# Patient Record
Sex: FEMALE | Race: WHITE | NOT HISPANIC OR LATINO | Employment: FULL TIME | URBAN - METROPOLITAN AREA
[De-identification: names, ages, dates, MRNs, and addresses within clinical notes are randomized per-mention and may not be internally consistent; named-entity substitution may affect disease eponyms.]

---

## 2017-01-05 ENCOUNTER — TRANSFERRED RECORDS (OUTPATIENT)
Dept: HEALTH INFORMATION MANAGEMENT | Facility: CLINIC | Age: 57
End: 2017-01-05

## 2017-02-06 DIAGNOSIS — G43.009 MIGRAINE WITHOUT AURA AND WITHOUT STATUS MIGRAINOSUS, NOT INTRACTABLE: Primary | ICD-10-CM

## 2017-02-06 DIAGNOSIS — Z56.6 STRESS AT WORK: ICD-10-CM

## 2017-02-06 RX ORDER — TOPIRAMATE 50 MG/1
50 TABLET, FILM COATED ORAL 2 TIMES DAILY
Qty: 180 TABLET | Refills: 1 | Status: CANCELLED | OUTPATIENT
Start: 2017-02-06

## 2017-02-06 RX ORDER — GLIPIZIDE 5 MG/1
5 TABLET, FILM COATED, EXTENDED RELEASE ORAL
Qty: 180 TABLET | Refills: 0 | Status: CANCELLED | OUTPATIENT
Start: 2017-02-06

## 2017-02-06 RX ORDER — BUPROPION HYDROCHLORIDE 150 MG/1
150 TABLET ORAL EVERY MORNING
Qty: 90 TABLET | Refills: 0 | Status: CANCELLED | OUTPATIENT
Start: 2017-02-06

## 2017-02-06 RX ORDER — LISINOPRIL 10 MG/1
10 TABLET ORAL DAILY
Qty: 90 TABLET | Refills: 1 | Status: CANCELLED | OUTPATIENT
Start: 2017-02-06

## 2017-02-06 SDOH — HEALTH STABILITY - MENTAL HEALTH: OTHER PHYSICAL AND MENTAL STRAIN RELATED TO WORK: Z56.6

## 2017-02-06 NOTE — TELEPHONE ENCOUNTER
topiramate (TOPAMAX) 50 MG tablet       Last Written Prescription Date: 6/17/16  Last Fill Quantity: 180, # refills: 1    [place] [facility] [place] [facility] or  Health prescribing provider:  9/19/2016     Future Office Visit:      CREATININE   Date Value Ref Range Status   09/19/2016 0.78 0.52 - 1.04 mg/dL Final     ________________________________________________________________________________  lisinopril (PRINIVIL,ZESTRIL) 10 MG tablet      Last Written Prescription Date: 6/17/16  Last Fill Quantity: 90, # refills: 1  [place] [facility] [place] [facility] or  Health prescribing provider: 9/19/2016       POTASSIUM   Date Value Ref Range Status   09/19/2016 4.0 3.4 - 5.3 mmol/L Final     CREATININE   Date Value Ref Range Status   09/19/2016 0.78 0.52 - 1.04 mg/dL Final     BP Readings from Last 3 Encounters:   09/19/16 118/88   07/14/16 114/82   06/17/16 94/60     ________________________________________________________________________________  glipiZIDE (GLIPIZIDE XL) 5 MG 24 hr tablet         Last Written Prescription Date: 6/17/16  Last Fill Quantity: 180, # refills: 0  [place] [facility] [place] [facility] or  Health prescribing provider:  9/19/2016        BP Readings from Last 3 Encounters:   09/19/16 118/88   07/14/16 114/82   06/17/16 94/60     MICROL        9   9/19/2016  No results found for this basename: microalbumin  CREATININE   Date Value Ref Range Status   09/19/2016 0.78 0.52 - 1.04 mg/dL Final   ]  GFR ESTIMATE   Date Value Ref Range Status   09/19/2016 77 >60 mL/min/1.7m2 Final     Comment:     Non  GFR Calc   06/17/2016 83 >60 mL/min/1.7m2 Final     Comment:     Non  GFR Calc   12/17/2015 88 >60 mL/min/1.7m2 Final     Comment:     Non  GFR Calc     GFR ESTIMATE IF BLACK   Date Value Ref Range Status   09/19/2016 >90   GFR Calc   >60 mL/min/1.7m2 Final   06/17/2016 >90   GFR Calc   >60 mL/min/1.7m2 Final   12/17/2015 >90    American GFR Calc   >60 mL/min/1.7m2 Final     CHOL      124   6/17/2016  HDL       46   6/17/2016  LDL       51   6/17/2016  LDL       54   6/18/2015  TRIG      137   6/17/2016  CHOLHDLRATIO      3.1   8/24/2015  AST       16   9/19/2016  ALT       30   9/19/2016  A1C      7.0   9/19/2016  A1C      8.4   6/17/2016  A1C      7.6   12/17/2015  A1C      7.7   8/24/2015  A1C      8.3   6/18/2015  POTASSIUM   Date Value Ref Range Status   09/19/2016 4.0 3.4 - 5.3 mmol/L Final     ________________________________________________________________________________  buPROPion (WELLBUTRIN XL) 150 MG 24 hr tablet       Last Written Prescription Date: 6/17/16  Last Fill Quantity: 90; # refills: 1  Last Office Visit with [facility], [facility] or  Health prescribing provider:  9/19/2016        Last PHQ-9 score on record=   PHQ-9 SCORE 9/19/2016   Total Score -   Total Score 8       AST       16   9/19/2016  ALT       30   9/19/2016        REZA Singer

## 2017-02-06 NOTE — TELEPHONE ENCOUNTER
Routing refill request to provider for review/approval because:  Drug not on the [facility] refill protocol Topamax    Due for follow up exam for DM and mood in March OK to fill all?  Bhargavi Mosqueda RN, BS  Clinical Nurse Triage .

## 2017-02-23 ENCOUNTER — TELEPHONE (OUTPATIENT)
Dept: FAMILY MEDICINE | Facility: CLINIC | Age: 57
End: 2017-02-23

## 2017-02-23 NOTE — TELEPHONE ENCOUNTER
Panel Management Review      Patient has the following on her problem list:     Depression / Dysthymia review  PHQ-9 SCORE 12/17/2015 6/17/2016 9/19/2016   Total Score - - -   Total Score 11 8 8      Patient is due for:  PHQ9    Diabetes    ASA: Passed    Last A1C  Lab Results   Component Value Date    A1C 7.0 09/19/2016    A1C 8.4 06/17/2016    A1C 7.6 12/17/2015    A1C 7.7 08/24/2015    A1C 8.3 06/18/2015     A1C tested: Failed    Last LDL:    Lab Results   Component Value Date    CHOL 124 06/17/2016     Lab Results   Component Value Date    HDL 46 06/17/2016     Lab Results   Component Value Date    LDL 51 06/17/2016    LDL 54 06/18/2015     Lab Results   Component Value Date    TRIG 137 06/17/2016     Lab Results   Component Value Date    CHOLHDLRATIO 3.1 08/24/2015     Lab Results   Component Value Date    NHDL 78 06/17/2016       Is the patient on a Statin? YES             Is the patient on Aspirin? YES    Medications     HMG CoA Reductase Inhibitors    atorvastatin (LIPITOR) 10 MG tablet    Salicylates    aspirin 325 MG EC tablet          Last three blood pressure readings:  BP Readings from Last 3 Encounters:   09/19/16 118/88   07/14/16 114/82   06/17/16 94/60       Date of last diabetes office visit: 09/19/16     Tobacco History:     History   Smoking Status     Former Smoker     Packs/day: 1.00     Years: 5.00     Types: Cigarettes     Quit date: 1/4/1985   Smokeless Tobacco     Never Used     Comment: quit in 1985 smoked one pack a day         Hypertension   Last three blood pressure readings:  BP Readings from Last 3 Encounters:   09/19/16 118/88   07/14/16 114/82   06/17/16 94/60     Blood pressure: Passed    HTN Guidelines:  Age 18-59 BP range:  Less than 140/90  Age 60-85 with Diabetes:  Less than 140/90  Age 60-85 without Diabetes:  less than 150/90      Composite cancer screening  Chart review shows that this patient is due/due soon for the following Mammogram  Summary:    Patient is due/failing  the following:   ALT, A1C, LDL, MAMMOGRAM and PHQ9    Action needed:   Patient needs office visit for DIABETES MANAGEMENT.    Type of outreach:    None, routed to provider for review. Patient has a upcoming DIABETES MANAGEMENT appointment scheduled on 02/27/16.    Questions for provider review:    None                                                                                                                                    Lisa Magill, CMA       Chart routed to Provider .

## 2017-02-24 NOTE — PROGRESS NOTES
HPI   SUBJECTIVE:                                                    Susan Dietrich is a 56 year old female who presents to clinic today for the following health issues:    Diabetes Follow-up    Patient is checking blood sugars: once daily.  Results are as follows:         am:     Diabetic concerns: None     Symptoms of hypoglycemia (low blood sugar): none     Paresthesias (numbness or burning in feet) or sores: Yes-sometimes      Date of last diabetic eye exam: January 2017.  Lab Results   Component Value Date    A1C 7.0 09/19/2016    A1C 8.4 06/17/2016    A1C 7.6 12/17/2015    A1C 7.7 08/24/2015    A1C 8.3 06/18/2015     Reports that she has a new stressful job. Is working 5 days a week,and reports she needs to go back to 4 days a week.  Is unsure how her numbers are going to be due to ongoing stress with this job. Did have a high BS reading this morning at 135. Likes when numbers are closer to 100.     Hyperlipidemia Follow-Up      Rate your low fat/cholesterol diet?: not monitoring fat    Taking statin?  Yes, no muscle aches from statin    Other lipid medications/supplements?:  none     Hypertension Follow-up      Outpatient blood pressures are not being checked.    Low Salt Diet: no added salt  BP Readings from Last 6 Encounters:   02/27/17 116/80   09/19/16 118/88   07/14/16 114/82   06/17/16 94/60   12/17/15 120/80   08/24/15 110/80          Depression Followup    Status since last visit: Stable     See PHQ-9 for current symptoms.  Other associated symptoms: NONE     Complicating factors:   Significant life event:  Yes-  [place]    Current substance abuse:  None  Anxiety or Panic symptoms:  No    PHQ-9 SCORE 12/17/2015 6/17/2016 9/19/2016   Total Score - - -   Total Score 11 8 8     VINITA-7 SCORE 12/17/2015 6/17/2016 9/19/2016   Total Score - - -   Total Score 2 5 10     PHQ-9:  VINITA-7:7  PHQ-9  English PHQ-9   Any Language            Amount of exercise or physical activity: 2-3  days/week for an average of 15-30 minutes    Problems taking medications regularly: No    Medication side effects: none  Diet: regular (no restrictions)    PROBLEMS TO ADD ON...  Weight:   Wt Readings from Last 4 Encounters:   02/27/17 90.5 kg (199 lb 8 oz)   09/19/16 92.3 kg (203 lb 8 oz)   06/17/16 93.4 kg (206 lb)   12/17/15 94.8 kg (209 lb)   Is on Topamax 50 mg BID. Reports that she does not want to increase dose. Wants to slowly lose weight. Also does not want to be placed on a restrictive diet. Does note she is a picky eater. Will snack on nuts. Cooks with coconut, grape seed oil, or olive oil.     Problem list and histories reviewed & adjusted, as indicated.  Additional history: as documented    Recent Labs   Lab Test  02/27/17   0844  09/19/16   0910  06/17/16   0918  12/17/15   1140  08/24/15   0902  06/18/15   1635   11/21/14   0808   A1C  7.4*  7.0*  8.4*  7.6*  7.7*  8.3*   < >  7.7*   LDL   --    --   51   --   69  54   --   50   HDL   --    --   46*   --   44*   --    --   54   TRIG   --    --   137   --   123   --    --   111   ALT   --   30  54*  52*  46  78*   --   47   CR   --   0.78  0.73  0.70  0.68  0.62   --   0.56   GFRESTIMATED   --   77  83  88  90  >90  Non  GFR Calc     --   >90  Non  GFR Calc     GFRESTBLACK   --   >90   GFR Calc    >90   GFR Calc    >90   GFR Calc    >90   GFR Calc    >90   GFR Calc     --   >90   GFR Calc     POTASSIUM   --   4.0  4.2  3.9  4.1  4.3   --   4.3   TSH   --    --   1.19   --   1.93   --    --    --     < > = values in this interval not displayed.      BP Readings from Last 3 Encounters:   02/27/17 116/80   09/19/16 118/88   07/14/16 114/82    Wt Readings from Last 3 Encounters:   02/27/17 90.5 kg (199 lb 8 oz)   09/19/16 92.3 kg (203 lb 8 oz)   06/17/16 93.4 kg (206 lb)                  Labs reviewed in EPIC  Problem list,  "Medication list, Allergies, and Medical/Social/Surgical histories reviewed in [facility] and updated as appropriate.    ROS:  CONSTITUTIONAL:NEGATIVE for fever, chills, change in weight  INTEGUMENTARY/SKIN: NEGATIVE for worrisome rashes, moles or lesions  ENT/MOUTH: NEGATIVE for ear, mouth and throat problems  RESP:NEGATIVE for significant cough or SOB  MUSCULOSKELETAL: NEGATIVE for significant arthralgias or myalgia  PSYCHIATRIC: NEGATIVE for changes in mood or affect    This document serves as a record of the services and decisions personally performed and made by Concha Lucas MD. It was created on her behalf by Kathie Lee, a trained medical scribe. The creation of this document is based the provider's statements to the medical scribe.  Kathie Lee February 27, 2017 8:18 AM    OBJECTIVE:                                                    /80 (BP Location: Right arm, Patient Position: Chair, Cuff Size: Adult Large)  Pulse 86  Temp 98  F (36.7  C) (Oral)  Resp 20  Ht 1.651 m (5' 5\")  Wt 90.5 kg (199 lb 8 oz)  LMP 10/03/2010  SpO2 97%  BMI 33.2 kg/m2  Body mass index is 33.2 kg/(m^2).  GENERAL: healthy, alert and no distress  EYES: Eyes grossly normal to inspection, PERRL and conjunctivae and sclerae normal  HENT: ear canals and TM's normal, nose and mouth without ulcers or lesions  NECK: no adenopathy, no asymmetry, masses, or scars and thyroid normal to palpation  RESP: lungs clear to auscultation - no rales, rhonchi or wheezes  CV: regular rate and rhythm, normal S1 S2, no S3 or S4, no murmur, click or rub, no peripheral edema and peripheral pulses strong  MS: no gross musculoskeletal defects noted, no edema  SKIN: no suspicious lesions or rashes  NEURO: Normal strength and tone, mentation intact and speech normal  PSYCH: mentation appears normal, affect normal/bright  Diabetic foot exam: normal DP and PT pulses, some trophic changes or ulcerative lesions, normal sensory exam and normal monofilament " exam  Fungal toe nails noted    Diagnostic Test Results:  Results for orders placed or performed in visit on 02/27/17 (from the past 24 hour(s))   Hemoglobin A1c   Result Value Ref Range    Hemoglobin A1C 7.4 (H) 4.3 - 6.0 %        ASSESSMENT/PLAN:                                                    (E11.40,  E11.65) Type 2 diabetes, uncontrolled, with neuropathy  (primary encounter diagnosis)  Comment: Stable. Advised continued exercise and a healthy diet. Discussed Mediterranean diet. High in healthy fats-olive oil, nuts, and lower in carbs. Switch from canola to olive oil. Continue with medications.   Plan: lisinopril (PRINIVIL/ZESTRIL) 10 MG tablet,         glipiZIDE (GLIPIZIDE XL) 5 MG 24 hr tablet,         Lipid panel reflex to direct LDL, Hemoglobin         A1c, TSH with free T4 reflex, Comprehensive         metabolic panel        Follow up in six months.     (I73.9) PVD (peripheral vascular disease) (H)  Comment: Stable. Keep exercise  Plan: Follow up in six months.     (E78.5) Hyperlipidemia LDL goal <100  Comment: Fasting for labs. Will check lipids. Continue with Lipitor and Prevalite.   Plan: atorvastatin (LIPITOR) 10 MG tablet,         cholestyramine light (PREVALITE) 4 GM powder,         Lipid panel reflex to direct LDL        Follow up in six months.     (Z56.6) Stress at work  Comment: Still present. Continue with wellbutrin. Work letter completed to limit to four days a week.   Plan: Follow up as needed.    (E66.01) obesity due to excess calories (H)  Comment: Stable weight loss. Continue with topamax. Discussed we could increase to 100 mg, patient would like to stay at 50 mg for now. Continue with exercise. Discussed Mediterranean diet-olive oil, nuts, healthy fats. Olive oil instead of canola oil.   Plan: topiramate (TOPAMAX) 50 MG tablet        Follow up in six months.     (Z12.31) Visit for screening mammogram  Comment: Scheduled  Plan: MA Screening Digital Bilateral        Follow up as  needed    (F33.1) Major depressive disorder, recurrent episode, moderate (H)  Comment: Doing well on wellbutrin. Stable.   Plan: buPROPion (WELLBUTRIN XL) 150 MG 24 hr tablet        Follow up in six months.     (B35.1) Onychomycosis  Comment: Have tried lamisil in the past. Rash in third month. Will try topical treatment.   Plan: ciclopirox 8 % SOLN        Follow up as needed.     (Z23) Need for tetanus booster  Comment: TDAP booster needed. Patient agreed.   Plan: TDAP (ADACEL AGES 11-64)        Follow up as needed.     (Z23) Need for shingles vaccine  Comment: Shingles vaccine sent to  pharmacy to have completed.   Plan: zoster vaccine live, PF, (ZOSTAVAX) injection        Follow up as needed.         The information in this document, created by the medical scribe for me, accurately reflects the services I personally performed and the decisions made by me. I have reviewed and approved this document for accuracy prior to leaving the patient care area.  Concha Lucas MD 8:18 AM 2/27/2017          Concha Lucas MD  [facility]      Physical Exam

## 2017-02-27 ENCOUNTER — OFFICE VISIT (OUTPATIENT)
Dept: FAMILY MEDICINE | Facility: CLINIC | Age: 57
End: 2017-02-27
Payer: COMMERCIAL

## 2017-02-27 VITALS
WEIGHT: 199.5 LBS | DIASTOLIC BLOOD PRESSURE: 80 MMHG | RESPIRATION RATE: 20 BRPM | HEART RATE: 86 BPM | SYSTOLIC BLOOD PRESSURE: 116 MMHG | BODY MASS INDEX: 33.24 KG/M2 | OXYGEN SATURATION: 97 % | HEIGHT: 65 IN | TEMPERATURE: 98 F

## 2017-02-27 DIAGNOSIS — Z56.6 STRESS AT WORK: ICD-10-CM

## 2017-02-27 DIAGNOSIS — K58.9 IRRITABLE BOWEL SYNDROME WITHOUT DIARRHEA: ICD-10-CM

## 2017-02-27 DIAGNOSIS — F33.1 MAJOR DEPRESSIVE DISORDER, RECURRENT EPISODE, MODERATE (H): ICD-10-CM

## 2017-02-27 DIAGNOSIS — E78.5 HYPERLIPIDEMIA LDL GOAL <100: ICD-10-CM

## 2017-02-27 DIAGNOSIS — B35.1 ONYCHOMYCOSIS: ICD-10-CM

## 2017-02-27 DIAGNOSIS — E66.01 MORBID OBESITY DUE TO EXCESS CALORIES (H): Chronic | ICD-10-CM

## 2017-02-27 DIAGNOSIS — J32.0 CHRONIC MAXILLARY SINUSITIS: ICD-10-CM

## 2017-02-27 DIAGNOSIS — I73.9 PVD (PERIPHERAL VASCULAR DISEASE) (H): ICD-10-CM

## 2017-02-27 DIAGNOSIS — Z12.31 VISIT FOR SCREENING MAMMOGRAM: ICD-10-CM

## 2017-02-27 DIAGNOSIS — Z23 NEED FOR TETANUS BOOSTER: ICD-10-CM

## 2017-02-27 DIAGNOSIS — G43.009 MIGRAINE WITHOUT AURA AND WITHOUT STATUS MIGRAINOSUS, NOT INTRACTABLE: ICD-10-CM

## 2017-02-27 DIAGNOSIS — Z23 NEED FOR SHINGLES VACCINE: ICD-10-CM

## 2017-02-27 LAB
ALBUMIN SERPL-MCNC: 4 G/DL (ref 3.4–5)
ALP SERPL-CCNC: 69 U/L (ref 40–150)
ALT SERPL W P-5'-P-CCNC: 30 U/L (ref 0–50)
ANION GAP SERPL CALCULATED.3IONS-SCNC: 12 MMOL/L (ref 3–14)
AST SERPL W P-5'-P-CCNC: 21 U/L (ref 0–45)
BILIRUB SERPL-MCNC: 0.6 MG/DL (ref 0.2–1.3)
BUN SERPL-MCNC: 19 MG/DL (ref 7–30)
CALCIUM SERPL-MCNC: 9.6 MG/DL (ref 8.5–10.1)
CHLORIDE SERPL-SCNC: 109 MMOL/L (ref 94–109)
CHOLEST SERPL-MCNC: 121 MG/DL
CO2 SERPL-SCNC: 19 MMOL/L (ref 20–32)
CREAT SERPL-MCNC: 0.76 MG/DL (ref 0.52–1.04)
GFR SERPL CREATININE-BSD FRML MDRD: 79 ML/MIN/1.7M2
GLUCOSE SERPL-MCNC: 171 MG/DL (ref 70–99)
HBA1C MFR BLD: 7.4 % (ref 4.3–6)
HDLC SERPL-MCNC: 49 MG/DL
LDLC SERPL CALC-MCNC: 49 MG/DL
NONHDLC SERPL-MCNC: 72 MG/DL
POTASSIUM SERPL-SCNC: 4.2 MMOL/L (ref 3.4–5.3)
PROT SERPL-MCNC: 7.6 G/DL (ref 6.8–8.8)
SODIUM SERPL-SCNC: 140 MMOL/L (ref 133–144)
TRIGL SERPL-MCNC: 117 MG/DL
TSH SERPL DL<=0.005 MIU/L-ACNC: 1.36 MU/L (ref 0.4–4)

## 2017-02-27 PROCEDURE — 83036 HEMOGLOBIN GLYCOSYLATED A1C: CPT | Performed by: FAMILY MEDICINE

## 2017-02-27 PROCEDURE — 99214 OFFICE O/P EST MOD 30 MIN: CPT | Mod: 25 | Performed by: FAMILY MEDICINE

## 2017-02-27 PROCEDURE — 90471 IMMUNIZATION ADMIN: CPT | Performed by: FAMILY MEDICINE

## 2017-02-27 PROCEDURE — 90715 TDAP VACCINE 7 YRS/> IM: CPT | Performed by: FAMILY MEDICINE

## 2017-02-27 PROCEDURE — 36415 COLL VENOUS BLD VENIPUNCTURE: CPT | Performed by: FAMILY MEDICINE

## 2017-02-27 PROCEDURE — 84443 ASSAY THYROID STIM HORMONE: CPT | Performed by: FAMILY MEDICINE

## 2017-02-27 PROCEDURE — 80061 LIPID PANEL: CPT | Performed by: FAMILY MEDICINE

## 2017-02-27 PROCEDURE — 80053 COMPREHEN METABOLIC PANEL: CPT | Performed by: FAMILY MEDICINE

## 2017-02-27 RX ORDER — TOPIRAMATE 50 MG/1
50 TABLET, FILM COATED ORAL 2 TIMES DAILY
Qty: 180 TABLET | Refills: 1 | Status: SHIPPED | OUTPATIENT
Start: 2017-02-27 | End: 2017-03-15

## 2017-02-27 RX ORDER — ATORVASTATIN CALCIUM 10 MG/1
10 TABLET, FILM COATED ORAL DAILY
Qty: 90 TABLET | Refills: 3 | Status: SHIPPED | OUTPATIENT
Start: 2017-02-27 | End: 2017-03-15

## 2017-02-27 RX ORDER — CICLOPIROX 80 MG/ML
SOLUTION TOPICAL DAILY
Qty: 3 BOTTLE | Refills: 1 | Status: SHIPPED | OUTPATIENT
Start: 2017-02-27 | End: 2017-03-15

## 2017-02-27 RX ORDER — GLIPIZIDE 5 MG/1
5 TABLET, FILM COATED, EXTENDED RELEASE ORAL DAILY
Qty: 90 TABLET | Refills: 1 | Status: SHIPPED | OUTPATIENT
Start: 2017-02-27 | End: 2017-03-15

## 2017-02-27 RX ORDER — LISINOPRIL 10 MG/1
10 TABLET ORAL DAILY
Qty: 90 TABLET | Refills: 1 | Status: SHIPPED | OUTPATIENT
Start: 2017-02-27 | End: 2017-03-15

## 2017-02-27 RX ORDER — BUPROPION HYDROCHLORIDE 150 MG/1
150 TABLET ORAL EVERY MORNING
Qty: 90 TABLET | Refills: 1 | Status: SHIPPED | OUTPATIENT
Start: 2017-02-27 | End: 2017-03-15

## 2017-02-27 RX ORDER — CHOLESTYRAMINE LIGHT 4 G/5.7G
POWDER, FOR SUSPENSION ORAL
Qty: 231 G | Refills: 4 | Status: SHIPPED | OUTPATIENT
Start: 2017-02-27 | End: 2017-03-15

## 2017-02-27 SDOH — HEALTH STABILITY - MENTAL HEALTH: OTHER PHYSICAL AND MENTAL STRAIN RELATED TO WORK: Z56.6

## 2017-02-27 ASSESSMENT — ANXIETY QUESTIONNAIRES
3. WORRYING TOO MUCH ABOUT DIFFERENT THINGS: SEVERAL DAYS
GAD7 TOTAL SCORE: 7
1. FEELING NERVOUS, ANXIOUS, OR ON EDGE: MORE THAN HALF THE DAYS
6. BECOMING EASILY ANNOYED OR IRRITABLE: SEVERAL DAYS
7. FEELING AFRAID AS IF SOMETHING AWFUL MIGHT HAPPEN: NOT AT ALL
5. BEING SO RESTLESS THAT IT IS HARD TO SIT STILL: SEVERAL DAYS
2. NOT BEING ABLE TO STOP OR CONTROL WORRYING: SEVERAL DAYS

## 2017-02-27 ASSESSMENT — PATIENT HEALTH QUESTIONNAIRE - PHQ9: 5. POOR APPETITE OR OVEREATING: SEVERAL DAYS

## 2017-02-27 NOTE — PROGRESS NOTES
Screening Questionnaire for Adult Immunization    Are you sick today?   No   Do you have allergies to medications, food, a vaccine component or latex?   No   Have you ever had a serious reaction after receiving a vaccination?   No   Do you have a long-term health problem with heart disease, lung disease, asthma, kidney disease, metabolic disease (e.g. diabetes), anemia, or other blood disorder?   Yes   Do you have cancer, leukemia, HIV/AIDS, or any other immune system problem?   No   In the past 3 months, have you taken medications that affect  your immune system, such as prednisone, other steroids, or anticancer drugs; drugs for the treatment of rheumatoid arthritis, Crohn s disease, or psoriasis; or have you had radiation treatments?   No   Have you had a seizure, or a brain or other nervous system problem?   No   During the past year, have you received a transfusion of blood or blood     products, or been given immune (gamma) globulin or antiviral drug?   No   For women: Are you pregnant or is there a chance you could become        pregnant during the next month?   No   Have you received any vaccinations in the past 4 weeks?   No     Immunization questionnaire was positive for at least one answer.  Notified Dr. Lucas.      MNVFC doesn't apply on this patient    Per orders of Dr. Lucas, injection of Tdap given by Lisa A. Magill. Patient instructed to remain in clinic for 20 minutes afterwards, and to report any adverse reaction to me immediately.       Screening performed by Lisa A. Magill on 2/27/2017 at 11:30 AM.

## 2017-02-27 NOTE — MR AVS SNAPSHOT
After Visit Summary   2/27/2017    Susan Dietrich    MRN: [id number]           Patient Information     Date Of Birth          1960        Visit Information        Provider Department      2/27/2017 8:00 AM Concha Lucas MD [facility]        Today's Diagnoses     Type 2 diabetes, uncontrolled, with neuropathy    -  1    PVD (peripheral vascular disease) (H)        Hyperlipidemia LDL goal <100        Stress at work        Migraine without aura and without status migrainosus, not intractable        Chronic maxillary sinusitis        Irritable bowel syndrome without diarrhea        obesity due to excess calories (H)        Visit for screening mammogram        Major depressive disorder, recurrent episode, moderate (H)        Onychomycosis        Need for tetanus booster        Need for shingles vaccine           Follow-ups after your visit        Follow-up notes from your care team     Return in about 6 months (around 8/27/2017).      Future tests that were ordered for you today     Open Future Orders        Priority Expected Expires Ordered    MA Screening Digital Bilateral Routine  2/27/2018 2/27/2017            Who to contact     If you have questions or need follow up information about today's clinic visit or your schedule please contact [facility] directly at [phone].  Normal or non-critical lab and imaging results will be communicated to you by MyChart, letter or phone within 4 business days after the clinic has received the results. If you do not hear from us within 7 days, please contact the clinic through MyChart or phone. If you have a critical or abnormal lab result, we will notify you by phone as soon as possible.  Submit refill requests through [employer] or call your pharmacy and they will forward the refill request to us. Please allow 3 business days for your refill to be completed.          Additional Information About Your Visit       "  [employer]Ut Information     [employer] gives you secure access to your electronic health record. If you see a primary care provider, you can also send messages to your care team and make appointments. If you have questions, please call your primary care clinic.  If you do not have a primary care provider, please call [phone] and they will assist you.        Care EveryWhere ID     This is your Care EveryWhere ID. This could be used by other organizations to access your [facility] medical records  [id number]        Your Vitals Were     Pulse Temperature Respirations Height Last Period Pulse Oximetry    86 98  F (36.7  C) (Oral) 20 5' 5\" (1.651 m) 10/03/2010 97%    BMI (Body Mass Index)                   33.2 kg/m2            Blood Pressure from Last 3 Encounters:   02/27/17 116/80   09/19/16 118/88   07/14/16 114/82    Weight from Last 3 Encounters:   02/27/17 199 lb 8 oz (90.5 kg)   09/19/16 203 lb 8 oz (92.3 kg)   06/17/16 206 lb (93.4 kg)              We Performed the Following     Comprehensive metabolic panel     Hemoglobin A1c     Lipid panel reflex to direct LDL     TDAP (ADACEL AGES 11-64)     TSH with free T4 reflex          Today's Medication Changes          These changes are accurate as of: 2/27/17  8:39 AM.  If you have any questions, ask your nurse or doctor.               Start taking these medicines.        Dose/Directions    ciclopirox 8 % Soln   Used for:  Onychomycosis   Started by:  Concha Lucas MD        Externally apply topically daily   Quantity:  3 Bottle   Refills:  1       zoster vaccine live (PF) injection   Commonly known as:  ZOSTAVAX   Used for:  Need for shingles vaccine   Started by:  Concha Lucas MD        Dose:  1 each   Inject 0.65 mLs Subcutaneous once for 1 dose   Quantity:  0.65 mL   Refills:  0            Where to get your medicines      These medications were sent to [facility] - [facility], [place] - [address] [place] [address] " [facility] [address]     Phone:  [phone]     atorvastatin 10 MG tablet    buPROPion 150 MG 24 hr tablet    cholestyramine light 4 GM powder    ciclopirox 8 % Soln    glipiZIDE 5 MG 24 hr tablet    lisinopril 10 MG tablet    topiramate 50 MG tablet         These medications were sent to [facility] [address]  [address] [facility] [address]     Phone:  [phone]     zoster vaccine live (PF) injection                Primary Care Provider Office Phone # Fax #    Concha Kristin Lucas [phone]       [facility] [address]  [address]   [facility] [address]        Thank you!     Thank you for choosing [facility]  for your care. Our goal is always to provide you with excellent care. Hearing back from our patients is one way we can continue to improve our services. Please take a few minutes to complete the written survey that you may receive in the mail after your visit with us. Thank you!             Your Updated Medication List - Protect others around you: Learn how to safely use, store and throw away your medicines at www.disposemymeds.org.          This list is accurate as of: 2/27/17  8:39 AM.  Always use your most recent med list.                   Brand Name Dispense Instructions for use    aspirin 325 MG EC tablet     100 tablet    Take 1 tablet (325 mg) by mouth daily       atorvastatin 10 MG tablet    LIPITOR    90 tablet    Take 1 tablet (10 mg) by mouth daily       buPROPion 150 MG 24 hr tablet    WELLBUTRIN XL    90 tablet    Take 1 tablet (150 mg) by mouth every morning       cholecalciferol 5000 UNITS Caps     90 capsule    Take 1 capsule by mouth daily.       cholestyramine light 4 GM powder    PREVALITE    231 g    1 LEVEL SCOOPFUL DAILY IN LIQUID       ciclopirox 8 % Soln     3 Bottle    Externally apply topically daily       fluticasone 50 MCG/ACT spray    FLONASE    3 g    Spray 1-2 sprays into  both nostrils daily       glipiZIDE 5 MG 24 hr tablet    glipiZIDE XL    90 tablet    Take 1 tablet (5 mg) by mouth daily       lisinopril 10 MG tablet    PRINIVIL/ZESTRIL    90 tablet    Take 1 tablet (10 mg) by mouth daily       metFORMIN 500 MG tablet    GLUCOPHAGE    360 tablet    Take 2 tablets (1,000 mg) by mouth 2 times daily (with meals)       * ONE TOUCH ULTRA test strip   Generic drug:  blood glucose monitoring     300 strip    by In Vitro route 3 times daily. Test as directed 3 times daily       * blood glucose monitoring test strip    TRUETEST    3 Box    by In Vitro route 3 times daily.       order for DME     1 each    Orthotic shoes, pt with diabetes and peripheral neuropathy       topiramate 50 MG tablet    TOPAMAX    180 tablet    Take 1 tablet (50 mg) by mouth 2 times daily       TRUEPLUS LANCETS 33G Misc     3 Month    1 strip 3 times daily TRUETEST LANCETS to be used with the Trutest test strips.       zoster vaccine live (PF) injection    ZOSTAVAX    0.65 mL    Inject 0.65 mLs Subcutaneous once for 1 dose       * Notice:  This list has 2 medication(s) that are the same as other medications prescribed for you. Read the directions carefully, and ask your doctor or other care provider to review them with you.

## 2017-02-27 NOTE — NURSING NOTE
"Chief Complaint   Patient presents with     Diabetes     Health Maintenance     *DUE*     Blood Draw     LABS.  Patient is fasting     Initial /80 (BP Location: Right arm, Patient Position: Chair, Cuff Size: Adult Large)  Pulse 86  Temp 98  F (36.7  C) (Oral)  Resp 20  Ht 5' 5\" (1.651 m)  Wt 199 lb 8 oz (90.5 kg)  LMP 10/03/2010  SpO2 97%  BMI 33.2 kg/m2 Estimated body mass index is 33.2 kg/(m^2) as calculated from the following:    Height as of this encounter: 5' 5\" (1.651 m).    Weight as of this encounter: 199 lb 8 oz (90.5 kg).  BP completed using cuff size large right arm.    Lisa Magill, CMA    "

## 2017-02-27 NOTE — LETTER
[address], [address]  [facility] [address]  Phone: [phone]  Fax: [phone]    February 27, 2017        Susan Dietrich  [address] [place] [address]          To whom it may concern:    RE: Susan Dietrich    Patient was seen and treated today at our clinic.  When the patient returns to work, the following restrictions apply until further notice:    Radha is allowed to work only 4 days per week due to chronic medical conditions.    Please contact me for questions or concerns.      Sincerely,        Concha Lucas MD

## 2017-02-28 ASSESSMENT — PATIENT HEALTH QUESTIONNAIRE - PHQ9: SUM OF ALL RESPONSES TO PHQ QUESTIONS 1-9: 7

## 2017-02-28 ASSESSMENT — ANXIETY QUESTIONNAIRES: GAD7 TOTAL SCORE: 7

## 2017-03-07 DIAGNOSIS — Z12.31 VISIT FOR SCREENING MAMMOGRAM: ICD-10-CM

## 2017-03-07 PROCEDURE — G0202 SCR MAMMO BI INCL CAD: HCPCS | Mod: TC

## 2017-08-25 ENCOUNTER — TELEPHONE (OUTPATIENT)
Dept: FAMILY MEDICINE | Facility: CLINIC | Age: 57
End: 2017-08-25

## 2017-08-25 NOTE — TELEPHONE ENCOUNTER
Panel Management Review      Patient has the following on her problem list:     Depression / Dysthymia review  PHQ-9 SCORE 6/17/2016 9/19/2016 2/27/2017   Total Score - - -   Total Score 8 8 7      Patient is due for:  PHQ9    Diabetes    ASA: Passed    Last A1C  Lab Results   Component Value Date    A1C 7.4 02/27/2017    A1C 7.0 09/19/2016    A1C 8.4 06/17/2016    A1C 7.6 12/17/2015    A1C 7.7 08/24/2015     A1C tested: FAILED    Last LDL:    Lab Results   Component Value Date    CHOL 121 02/27/2017     Lab Results   Component Value Date    HDL 49 02/27/2017     Lab Results   Component Value Date    LDL 49 02/27/2017     Lab Results   Component Value Date    TRIG 117 02/27/2017     Lab Results   Component Value Date    CHOLHDLRATIO 3.1 08/24/2015     Lab Results   Component Value Date    NHDL 72 02/27/2017       Is the patient on a Statin? YES             Is the patient on Aspirin? YES    Medications     HMG CoA Reductase Inhibitors    atorvastatin (LIPITOR) 10 MG tablet    Salicylates    aspirin 325 MG EC tablet          Last three blood pressure readings:  BP Readings from Last 3 Encounters:   02/27/17 116/80   09/19/16 118/88   07/14/16 114/82       Date of last diabetes office visit: 02/27/17.     Tobacco History:     History   Smoking Status     Former Smoker     Packs/day: 1.00     Years: 5.00     Types: Cigarettes     Quit date: 1/4/1985   Smokeless Tobacco     Never Used     Comment: quit in 1985 smoked one pack a day         Hypertension   Last three blood pressure readings:  BP Readings from Last 3 Encounters:   02/27/17 116/80   09/19/16 118/88   07/14/16 114/82     Blood pressure: Passed    HTN Guidelines:  Age 18-59 BP range:  Less than 140/90  Age 60-85 with Diabetes:  Less than 140/90  Age 60-85 without Diabetes:  less than 150/90            Composite cancer screening  Chart review shows that this patient is due/due soon for the following Fecal Colorectal (FIT)  Summary:    Patient is due/failing  the following:   ALT, A1C, FIT, LDL and PHQ9    Action needed:   Patient needs office visit for DIABETES MANAGEMENT.    Type of outreach:    Sent [employer] message.    Questions for provider review:    None                                                                                                                                    Lisa Magill, CMA       Chart routed to Care Team .

## 2017-09-11 NOTE — PROGRESS NOTES
HPI   SUBJECTIVE:   Susan Dietrich is a 57 year old female who presents to clinic today for the following health issues:    Diabetes Follow-up      Patient is checking blood sugars: not at all    Diabetic concerns: None     Symptoms of hypoglycemia (low blood sugar): none     Paresthesias (numbness or burning in feet) or sores: No     Date of last diabetic eye exam: Jan 2017     Has not been checking blood glucose levels. Patient states that she stopped exercising for a brief period of time because she had to take another exam at work and had to plan a surprise birthday party. Patient would like to stay at 50 mg of Topamax. There are times when she has to force herself to eat because she is not hungry. Patient states that her  will nag her to eat and cook. She would rather eat sweats than food. She continues to take metformin and glipizide regularly.     Hyperlipidemia Follow-Up      Rate your low fat/cholesterol diet?: fair    Taking statin?  No    Other lipid medications/supplements?:  None    LDL Cholesterol Calculated   Date Value Ref Range Status   02/27/2017 49 <100 mg/dL Final     Comment:     Desirable:       <100 mg/dl       Patient continues taking lipitor regularly.     Hypertension Follow-up      Outpatient blood pressures are not being checked.    Low Salt Diet: no added salt    Patient continues taking lisinopril regularly.     Depression Followup    Status since last visit: Worsened     See PHQ-9 for current symptoms.  Other associated symptoms: None    Complicating factors:   Significant life event:  No   Current substance abuse:  None  Anxiety or Panic symptoms:  No    PHQ-9  English  PHQ-9   Any Language      Amount of exercise or physical activity: 2-3 days/week for an average of 15-30 minutes    Problems taking medications regularly: No    Medication side effects: none-having some back pain, unsure if related.   Diet: regular (no restrictions)    PHQ-9 SCORE 6/17/2016 9/19/2016 2/27/2017    Total Score - - -   Total Score 8 8 7     Patient reports that the depression has slightly worsened. She presented the doctor's note to her boss about working only 4 days a week, but states that her boss did not accept it or handle it well. She would like another note to try talking to her boss again. Patient is currently on a low dose of Wellbutrin and she declined increasing dosage.     Back pain  Patient states that her back pain and back stiffness has been worsening. Pain comes suddenly and can occur when standing or bending over. Declined physical therapy and medication to alleviate symptoms.     PROBLEMS TO ADD ON...  She still experiences occasional diarrhea and is taking Prevalite to improve symptoms.     Problem list and histories reviewed & adjusted, as indicated.  Additional history: as documented    Recent Labs   Lab Test  02/27/17   0844  09/19/16   0910  06/17/16   0918   08/24/15   0902   A1C  7.4*  7.0*  8.4*   < >  7.7*   LDL  49   --   51   --   69   HDL  49*   --   46*   --   44*   TRIG  117   --   137   --   123   ALT  30  30  54*   < >  46   CR  0.76  0.78  0.73   < >  0.68   GFRESTIMATED  79  77  83   < >  90   GFRESTBLACK  >90   GFR Calc    >90   GFR Calc    >90   GFR Calc     < >  >90   GFR Calc     POTASSIUM  4.2  4.0  4.2   < >  4.1   TSH  1.36   --   1.19   --   1.93    < > = values in this interval not displayed.      BP Readings from Last 3 Encounters:   09/12/17 112/82   02/27/17 116/80   09/19/16 118/88    Wt Readings from Last 3 Encounters:   09/12/17 91.6 kg (202 lb)   02/27/17 90.5 kg (199 lb 8 oz)   09/19/16 92.3 kg (203 lb 8 oz)         Labs reviewed in EPIC    Reviewed and updated as needed this visit by clinical staffTobacco  Allergies  Problems  Med Hx  Soc Hx      Reviewed and updated as needed this visit by Provider         ROS:  Constitutional, HEENT, cardiovascular, pulmonary, gi and gu systems are  negative, except as otherwise noted.    MUSC: POSITIVE for back pain  GI: POSITIVE for occasional diarrhea    This document serves as a record of the services and decisions personally performed and made by Concha Lucas MD. It was created on her behalf by Nevaeh Duran, a trained medical scribe. The creation of this document is based on the provider's statements to the medical scribe.  Nevaeh Duran September 12, 2017 8:27 AM     OBJECTIVE:     /82 (BP Location: Right arm, Cuff Size: Adult Regular)  Pulse 80  Temp 97.9  F (36.6  C) (Oral)  Resp 16  Wt 91.6 kg (202 lb)  LMP 10/03/2010  SpO2 94%  BMI 33.61 kg/m2  Body mass index is 33.61 kg/(m^2).     GENERAL: healthy, alert and no distress  EYES: Eyes grossly normal to inspection, PERRL and conjunctivae and sclerae normal  HENT: ear canals and TM's normal, nose and mouth without ulcers or lesions  NECK: no adenopathy, no asymmetry, masses, or scars and thyroid normal to palpation  RESP: lungs clear to auscultation - no rales, rhonchi or wheezes  CV: regular rate and rhythm, normal S1 S2, no S3 or S4, no murmur, click or rub, no peripheral edema and peripheral pulses strong  MS: no gross musculoskeletal defects noted, no edema  SKIN: no suspicious lesions or rashes  NEURO: Normal strength and tone, mentation intact and speech normal  PSYCH: mentation appears normal, affect normal/bright    Diagnostic Test Results:  none     ASSESSMENT/PLAN:   (F32.1) Moderate major depression (H)  (primary encounter diagnosis)  Comment:  Patient declined increasing Wellbutrin dosage. Continue current meds.  Plan: Comprehensive metabolic panel          (F33.1) Major depressive disorder, recurrent episode, moderate (H)  Comment: Patient declined increasing Wellbutrin dosage. Continue current meds for now, pt is struggling at work and wants to work 4 days per week, requesting a note for work, will due , as her depression is poorly controlled(pt is however declining  change in medications).   Plan: buPROPion (WELLBUTRIN XL) 150 MG 24 hr tablet          (Z23) Need for prophylactic vaccination and inoculation against influenza  Comment: Administered today  Plan: FLU VACCINE, INCREASED ANTIGEN, PRESV FREE, AGE        65+ [47477],      ADMIN VACCINE, FIRST [52607]          (E11.40,  E11.65) Type 2 diabetes, uncontrolled, with neuropathy  Comment: Will perform labs today. If within normal limits, continue current meds.   Plan: HEMOGLOBIN A1C, metFORMIN (GLUCOPHAGE) 500 MG         tablet, lisinopril (PRINIVIL/ZESTRIL) 10 MG         tablet, glipiZIDE (GLIPIZIDE XL) 5 MG 24 hr         tablet, CANCELED: Lipid panel reflex to direct         LDL          (E66.01) obesity due to excess calories (H)  Comment: Recommended not eating if she is not hungry and to snack on fruit. Continue working on exercise and diet.   Plan: topiramate (TOPAMAX) 50 MG tablet          (Z12.11) Screen for colon cancer  Comment: FIT test ordered today  Plan: Fecal colorectal cancer screen FIT - Future         (S+30)          (E78.5) Hyperlipidemia LDL goal <100  Comment: Will perform labs today. If within normal limits, continue current meds.   Plan: Lipid panel reflex to direct LDL, atorvastatin         (LIPITOR) 10 MG tablet, cholestyramine light         (PREVALITE) 4 GM powder, Comprehensive         metabolic panel          (I10) Essential hypertension  Comment: Patient's blood pressure was within normal limits today. Continue current meds.   Plan: Albumin Random Urine Quantitative with Creat         Ratio, Albumin Random Urine Quantitative with         Creat Ratio          Work on weight loss  Regular exercise    The information in this document, created by the medical scribe for me, accurately reflects the services I personally performed and the decisions made by me. I have reviewed and approved this document for accuracy prior to leaving the patient care area.  September 12, 2017 8:27 AM    Concha Foster  MD Lance  [facility]      Physical Exam

## 2017-09-12 ENCOUNTER — OFFICE VISIT (OUTPATIENT)
Dept: FAMILY MEDICINE | Facility: CLINIC | Age: 57
End: 2017-09-12
Payer: COMMERCIAL

## 2017-09-12 VITALS
RESPIRATION RATE: 16 BRPM | WEIGHT: 202 LBS | TEMPERATURE: 97.9 F | DIASTOLIC BLOOD PRESSURE: 82 MMHG | SYSTOLIC BLOOD PRESSURE: 112 MMHG | BODY MASS INDEX: 33.61 KG/M2 | HEART RATE: 80 BPM | OXYGEN SATURATION: 94 %

## 2017-09-12 DIAGNOSIS — F33.1 MAJOR DEPRESSIVE DISORDER, RECURRENT EPISODE, MODERATE (H): Primary | ICD-10-CM

## 2017-09-12 DIAGNOSIS — I10 ESSENTIAL HYPERTENSION: ICD-10-CM

## 2017-09-12 DIAGNOSIS — E66.01 MORBID OBESITY DUE TO EXCESS CALORIES (H): Chronic | ICD-10-CM

## 2017-09-12 DIAGNOSIS — E78.5 HYPERLIPIDEMIA LDL GOAL <100: ICD-10-CM

## 2017-09-12 DIAGNOSIS — Z23 NEED FOR PROPHYLACTIC VACCINATION AND INOCULATION AGAINST INFLUENZA: ICD-10-CM

## 2017-09-12 DIAGNOSIS — Z12.11 SCREEN FOR COLON CANCER: ICD-10-CM

## 2017-09-12 LAB
ALBUMIN SERPL-MCNC: 3.6 G/DL (ref 3.4–5)
ALP SERPL-CCNC: 69 U/L (ref 40–150)
ALT SERPL W P-5'-P-CCNC: 36 U/L (ref 0–50)
ANION GAP SERPL CALCULATED.3IONS-SCNC: 8 MMOL/L (ref 3–14)
AST SERPL W P-5'-P-CCNC: 22 U/L (ref 0–45)
BILIRUB SERPL-MCNC: 0.7 MG/DL (ref 0.2–1.3)
BUN SERPL-MCNC: 15 MG/DL (ref 7–30)
CALCIUM SERPL-MCNC: 9.1 MG/DL (ref 8.5–10.1)
CHLORIDE SERPL-SCNC: 110 MMOL/L (ref 94–109)
CHOLEST SERPL-MCNC: 119 MG/DL
CO2 SERPL-SCNC: 21 MMOL/L (ref 20–32)
CREAT SERPL-MCNC: 0.73 MG/DL (ref 0.52–1.04)
GFR SERPL CREATININE-BSD FRML MDRD: 83 ML/MIN/1.7M2
GLUCOSE SERPL-MCNC: 138 MG/DL (ref 70–99)
HBA1C MFR BLD: 7.8 % (ref 4.3–6)
HDLC SERPL-MCNC: 47 MG/DL
LDLC SERPL CALC-MCNC: 46 MG/DL
NONHDLC SERPL-MCNC: 72 MG/DL
POTASSIUM SERPL-SCNC: 4.1 MMOL/L (ref 3.4–5.3)
PROT SERPL-MCNC: 7.5 G/DL (ref 6.8–8.8)
SODIUM SERPL-SCNC: 139 MMOL/L (ref 133–144)
TRIGL SERPL-MCNC: 132 MG/DL

## 2017-09-12 PROCEDURE — 99214 OFFICE O/P EST MOD 30 MIN: CPT | Mod: 25 | Performed by: FAMILY MEDICINE

## 2017-09-12 PROCEDURE — 82043 UR ALBUMIN QUANTITATIVE: CPT | Performed by: FAMILY MEDICINE

## 2017-09-12 PROCEDURE — 80061 LIPID PANEL: CPT | Performed by: FAMILY MEDICINE

## 2017-09-12 PROCEDURE — 90471 IMMUNIZATION ADMIN: CPT | Performed by: FAMILY MEDICINE

## 2017-09-12 PROCEDURE — 36415 COLL VENOUS BLD VENIPUNCTURE: CPT | Performed by: FAMILY MEDICINE

## 2017-09-12 PROCEDURE — 90686 IIV4 VACC NO PRSV 0.5 ML IM: CPT | Performed by: FAMILY MEDICINE

## 2017-09-12 PROCEDURE — 83036 HEMOGLOBIN GLYCOSYLATED A1C: CPT | Performed by: FAMILY MEDICINE

## 2017-09-12 PROCEDURE — 80053 COMPREHEN METABOLIC PANEL: CPT | Performed by: FAMILY MEDICINE

## 2017-09-12 RX ORDER — CHOLESTYRAMINE LIGHT 4 G/5.7G
POWDER, FOR SUSPENSION ORAL
Qty: 231 G | Refills: 4 | Status: SHIPPED | OUTPATIENT
Start: 2017-09-12 | End: 2018-10-05

## 2017-09-12 RX ORDER — GLIPIZIDE 5 MG/1
5 TABLET, FILM COATED, EXTENDED RELEASE ORAL DAILY
Qty: 90 TABLET | Refills: 1 | Status: SHIPPED | OUTPATIENT
Start: 2017-09-12 | End: 2017-12-15

## 2017-09-12 RX ORDER — TOPIRAMATE 50 MG/1
50 TABLET, FILM COATED ORAL 2 TIMES DAILY
Qty: 180 TABLET | Refills: 1 | Status: SHIPPED | OUTPATIENT
Start: 2017-09-12 | End: 2018-03-09

## 2017-09-12 RX ORDER — ATORVASTATIN CALCIUM 10 MG/1
10 TABLET, FILM COATED ORAL DAILY
Qty: 90 TABLET | Refills: 3 | Status: SHIPPED | OUTPATIENT
Start: 2017-09-12 | End: 2018-03-09

## 2017-09-12 RX ORDER — LISINOPRIL 10 MG/1
10 TABLET ORAL DAILY
Qty: 90 TABLET | Refills: 1 | Status: SHIPPED | OUTPATIENT
Start: 2017-09-12 | End: 2019-06-22

## 2017-09-12 RX ORDER — BUPROPION HYDROCHLORIDE 150 MG/1
150 TABLET ORAL EVERY MORNING
Qty: 90 TABLET | Refills: 1 | Status: SHIPPED | OUTPATIENT
Start: 2017-09-12 | End: 2018-03-09

## 2017-09-12 ASSESSMENT — ANXIETY QUESTIONNAIRES
2. NOT BEING ABLE TO STOP OR CONTROL WORRYING: SEVERAL DAYS
1. FEELING NERVOUS, ANXIOUS, OR ON EDGE: MORE THAN HALF THE DAYS
3. WORRYING TOO MUCH ABOUT DIFFERENT THINGS: SEVERAL DAYS
5. BEING SO RESTLESS THAT IT IS HARD TO SIT STILL: NOT AT ALL
GAD7 TOTAL SCORE: 8
7. FEELING AFRAID AS IF SOMETHING AWFUL MIGHT HAPPEN: NOT AT ALL
6. BECOMING EASILY ANNOYED OR IRRITABLE: MORE THAN HALF THE DAYS

## 2017-09-12 ASSESSMENT — PATIENT HEALTH QUESTIONNAIRE - PHQ9
5. POOR APPETITE OR OVEREATING: MORE THAN HALF THE DAYS
SUM OF ALL RESPONSES TO PHQ QUESTIONS 1-9: 11

## 2017-09-12 NOTE — PROGRESS NOTES
1.  Has the patient received the information for the influenza vaccine?  YES  2.  Does the patient have any of the following contraindications?         Allergy to eggs?  NO       Allergic reaction to previous influenza vaccines?  NO       Paralyzed by Guillain-Talmage syndrome?  NO       Currently have moderate or severe illness?  NO       Allergy to contact lens solution/thimerosol?  NO  3.  The vaccine has been administered and the patient was instructed to        wait 15 minutes before leaving the building in the event of an allergic        reaction:  YES    Vaccination given by Lisa Magill, CMA

## 2017-09-12 NOTE — NURSING NOTE
"Chief Complaint   Patient presents with     Diabetes       Initial /82 (BP Location: Right arm, Cuff Size: Adult Regular)  Pulse 80  Temp 97.9  F (36.6  C) (Oral)  Resp 16  Wt 202 lb (91.6 kg)  LMP 10/03/2010  SpO2 94%  BMI 33.61 kg/m2 Estimated body mass index is 33.61 kg/(m^2) as calculated from the following:    Height as of 2/27/17: 5' 5\" (1.651 m).    Weight as of this encounter: 202 lb (91.6 kg).  Medication Reconciliation: complete   Rosy Dyson MA    "

## 2017-09-12 NOTE — LETTER
[address], [address]  [facility] [address]  Phone: [phone]  Fax: [phone]    September 12, 2017        Susan Dietrich  [address] [place] [id number]          To whom it may concern:    RE: Susan Dietrich    Patient was seen and treated today at our clinic.  When the patient returns to work, the following restrictions apply until further notice:    Radha is allowed to work only 4 days per week due to chronic medical conditions.    Please contact me for questions or concerns.      Sincerely,        Concha Lucas MD

## 2017-09-12 NOTE — MR AVS SNAPSHOT
After Visit Summary   9/12/2017    Susan Dietrich    MRN: [id number]           Patient Information     Date Of Birth          1960        Visit Information        Provider Department      9/12/2017 8:20 AM Concha Lucas MD [facility]        Today's Diagnoses     Moderate major depression (H)    -  1    Major depressive disorder, recurrent episode, moderate (H)        Need for prophylactic vaccination and inoculation against influenza        Type 2 diabetes, uncontrolled, with neuropathy        obesity due to excess calories (H)        Screen for colon cancer        Hyperlipidemia LDL goal <100        Essential hypertension           Follow-ups after your visit        Follow-up notes from your care team     Return in about 6 months (around 3/12/2018).      Future tests that were ordered for you today     Open Future Orders        Priority Expected Expires Ordered    Fecal colorectal cancer screen FIT - Future (S+30) Routine 10/2/2017 10/11/2017 9/12/2017            Who to contact     If you have questions or need follow up information about today's clinic visit or your schedule please contact [facility] directly at [phone].  Normal or non-critical lab and imaging results will be communicated to you by MyChart, letter or phone within 4 business days after the clinic has received the results. If you do not hear from us within 7 days, please contact the clinic through [employer] or phone. If you have a critical or abnormal lab result, we will notify you by phone as soon as possible.  Submit refill requests through [employer] or call your pharmacy and they will forward the refill request to us. Please allow 3 business days for your refill to be completed.          Additional Information About Your Visit        MyChart Information     [employer] gives you secure access to your electronic health record. If you see a primary care provider, you can also send  messages to your care team and make appointments. If you have questions, please call your primary care clinic.  If you do not have a primary care provider, please call [phone] and they will assist you.        Care EveryWhere ID     This is your Care EveryWhere ID. This could be used by other organizations to access your [facility] medical records  [id number]        Your Vitals Were     Pulse Temperature Respirations Last Period Pulse Oximetry BMI (Body Mass Index)    80 97.9  F (36.6  C) (Oral) 16 10/03/2010 94% 33.61 kg/m2       Blood Pressure from Last 3 Encounters:   09/12/17 112/82   02/27/17 116/80   09/19/16 118/88    Weight from Last 3 Encounters:   09/12/17 202 lb (91.6 kg)   02/27/17 199 lb 8 oz (90.5 kg)   09/19/16 203 lb 8 oz (92.3 kg)              We Performed the Following          ADMIN VACCINE, FIRST [14527]     Albumin Random Urine Quantitative with Creat Ratio     Albumin Random Urine Quantitative with Creat Ratio     Comprehensive metabolic panel     DEPRESSION ACTION PLAN (DAP)     FLU VACCINE, INCREASED ANTIGEN, PRESV FREE, AGE 65+ [63341]     HEMOGLOBIN A1C     Lipid panel reflex to direct LDL          Where to get your medicines      These medications were sent to [facility] [address] [address]     Phone:  [phone]     atorvastatin 10 MG tablet    buPROPion 150 MG 24 hr tablet    cholestyramine light 4 GM powder    glipiZIDE 5 MG 24 hr tablet    lisinopril 10 MG tablet    metFORMIN 500 MG tablet    topiramate 50 MG tablet          Primary Care Provider Office Phone # Fax #    Concha Lucas [phone]       [id number]  [address]   [facility] [address]        Equal Access to Services     YARELI CRAIG : Mingo Sauer, ben becker, qaybta kaalpatti kay. So [facility] [phone].    ATENCIÓN: Si habla español, tiene a langley disposición  servicios gratuitos de asistencia lingüística. Dionne aguilar [phone].    We comply with applicable federal civil rights laws and [place] laws. We do not discriminate on the basis of race, color, national origin, age, disability sex, sexual orientation or gender identity.            Thank you!     Thank you for choosing [facility]  for your care. Our goal is always to provide you with excellent care. Hearing back from our patients is one way we can continue to improve our services. Please take a few minutes to complete the written survey that you may receive in the mail after your visit with us. Thank you!             Your Updated Medication List - Protect others around you: Learn how to safely use, store and throw away your medicines at www.disposemymeds.org.          This list is accurate as of: 9/12/17  8:39 AM.  Always use your most recent med list.                   Brand Name Dispense Instructions for use Diagnosis    aspirin 325 MG EC tablet     100 tablet    Take 1 tablet (325 mg) by mouth daily    Routine general medical examination at a health care facility       atorvastatin 10 MG tablet    LIPITOR    90 tablet    Take 1 tablet (10 mg) by mouth daily    Hyperlipidemia LDL goal <100       buPROPion 150 MG 24 hr tablet    WELLBUTRIN XL    90 tablet    Take 1 tablet (150 mg) by mouth every morning    Major depressive disorder, recurrent episode, moderate (H)       cholecalciferol 5000 UNITS Caps     90 capsule    Take 1 capsule by mouth daily.    Vitamin D deficiency       cholestyramine light 4 GM powder    PREVALITE    231 g    1 LEVEL SCOOPFUL DAILY IN LIQUID    Hyperlipidemia LDL goal <100       ciclopirox 8 % Soln     3 Bottle    Externally apply topically daily    Onychomycosis       fluticasone 50 MCG/ACT spray    FLONASE    3 g    Spray 1-2 sprays into both nostrils daily    Chronic maxillary sinusitis       glipiZIDE 5 MG 24 hr tablet    glipiZIDE XL    90 tablet    Take 1 tablet  (5 mg) by mouth daily    Type 2 diabetes, uncontrolled, with neuropathy (H)       lisinopril 10 MG tablet    PRINIVIL/ZESTRIL    90 tablet    Take 1 tablet (10 mg) by mouth daily    Type 2 diabetes, uncontrolled, with neuropathy (H)       metFORMIN 500 MG tablet    GLUCOPHAGE    360 tablet    Take 2 tablets (1,000 mg) by mouth 2 times daily (with meals)    Type 2 diabetes, uncontrolled, with neuropathy (H)       * ONE TOUCH ULTRA test strip   Generic drug:  blood glucose monitoring     300 strip    by In Vitro route 3 times daily. Test as directed 3 times daily    Type II or unspecified type diabetes mellitus without mention of complication, not stated as uncontrolled       * blood glucose monitoring test strip    TRUETEST    3 Box    by In Vitro route 3 times daily.    Type 2 diabetes, uncontrolled, with neuropathy (H)       order for DME     1 each    Orthotic shoes, pt with diabetes and peripheral neuropathy    Type 2 diabetes, HbA1C goal < 8% (H)       topiramate 50 MG tablet    TOPAMAX    180 tablet    Take 1 tablet (50 mg) by mouth 2 times daily    Morbid obesity due to excess calories (H)       TRUEPLUS LANCETS 33G Misc     3 Month    1 strip 3 times daily TRUETEST LANCETS to be used with the Trutest test strips.    Type 2 diabetes, uncontrolled, with neuropathy (H)       * Notice:  This list has 2 medication(s) that are the same as other medications prescribed for you. Read the directions carefully, and ask your doctor or other care provider to review them with you.

## 2017-09-13 LAB
CREAT UR-MCNC: 120 MG/DL
MICROALBUMIN UR-MCNC: 6 MG/L
MICROALBUMIN/CREAT UR: 5.3 MG/G CR (ref 0–25)

## 2017-09-13 ASSESSMENT — ANXIETY QUESTIONNAIRES: GAD7 TOTAL SCORE: 8

## 2017-11-28 ENCOUNTER — TELEPHONE (OUTPATIENT)
Dept: FAMILY MEDICINE | Facility: CLINIC | Age: 57
End: 2017-11-28

## 2017-11-28 NOTE — TELEPHONE ENCOUNTER
Panel Management Review      Patient has the following on her problem list:     Depression / Dysthymia review    Measure:  Needs PHQ-9 score of 4 or less during index window.  Administer PHQ-9 and if score is 5 or more, send encounter to provider for next steps.    5   7 month window range: PHQ-9 due NOW     PHQ-9 SCORE 9/19/2016 2/27/2017 9/12/2017   Total Score - - -   Total Score 8 7 11       If PHQ-9 recheck is 5 or more, route to provider for next steps.    Patient is due for:  PHQ9    Diabetes    ASA: Passed    Last A1C  Lab Results   Component Value Date    A1C 7.8 09/12/2017    A1C 7.4 02/27/2017    A1C 7.0 09/19/2016    A1C 8.4 06/17/2016    A1C 7.6 12/17/2015     A1C tested: FAILED    Last LDL:    Lab Results   Component Value Date    CHOL 119 09/12/2017     Lab Results   Component Value Date    HDL 47 09/12/2017     Lab Results   Component Value Date    LDL 46 09/12/2017     Lab Results   Component Value Date    TRIG 132 09/12/2017     Lab Results   Component Value Date    CHOLHDLRATIO 3.1 08/24/2015     Lab Results   Component Value Date    NHDL 72 09/12/2017       Is the patient on a Statin? YES             Is the patient on Aspirin? YES    Medications     HMG CoA Reductase Inhibitors    atorvastatin (LIPITOR) 10 MG tablet    Salicylates    aspirin 325 MG EC tablet          Last three blood pressure readings:  BP Readings from Last 3 Encounters:   09/12/17 112/82   02/27/17 116/80   09/19/16 118/88       Date of last diabetes office visit: 09/12/17     Tobacco History:     History   Smoking Status     Former Smoker     Packs/day: 1.00     Years: 5.00     Types: Cigarettes     Quit date: 1/4/1985   Smokeless Tobacco     Never Used     Comment: quit in 1985 smoked one pack a day         Hypertension   Last three blood pressure readings:  BP Readings from Last 3 Encounters:   09/12/17 112/82   02/27/17 116/80   09/19/16 118/88     Blood pressure: Passed    HTN Guidelines:  Age 18-59 BP range:  Less than  140/90  Age 60-85 with Diabetes:  Less than 140/90  Age 60-85 without Diabetes:  less than 150/90            Composite cancer screening  Chart review shows that this patient is due/due soon for the following Fecal Colorectal (FIT)  Summary:    Patient is due/failing the following:   A1C and FIT    Action needed:   Patient needs non-fasting lab only appointment    Type of outreach:    Sent [employer] message.    Questions for provider review:    None                                                                                                                                    Lisa Magill, CMA       Chart routed to Care Team .

## 2017-12-15 ENCOUNTER — OFFICE VISIT (OUTPATIENT)
Dept: FAMILY MEDICINE | Facility: CLINIC | Age: 57
End: 2017-12-15
Payer: COMMERCIAL

## 2017-12-15 ENCOUNTER — RADIANT APPOINTMENT (OUTPATIENT)
Dept: GENERAL RADIOLOGY | Facility: CLINIC | Age: 57
End: 2017-12-15
Attending: FAMILY MEDICINE
Payer: COMMERCIAL

## 2017-12-15 VITALS
OXYGEN SATURATION: 97 % | RESPIRATION RATE: 20 BRPM | HEART RATE: 83 BPM | TEMPERATURE: 98.4 F | DIASTOLIC BLOOD PRESSURE: 80 MMHG | SYSTOLIC BLOOD PRESSURE: 112 MMHG

## 2017-12-15 DIAGNOSIS — M22.2X2 PATELLOFEMORAL DISORDER OF LEFT KNEE: ICD-10-CM

## 2017-12-15 DIAGNOSIS — I10 HTN, GOAL BELOW 140/90: ICD-10-CM

## 2017-12-15 DIAGNOSIS — Z12.11 SCREEN FOR COLON CANCER: ICD-10-CM

## 2017-12-15 DIAGNOSIS — F32.1 MODERATE MAJOR DEPRESSION (H): ICD-10-CM

## 2017-12-15 LAB — HBA1C MFR BLD: 8.5 % (ref 4.3–6)

## 2017-12-15 PROCEDURE — 73562 X-RAY EXAM OF KNEE 3: CPT | Mod: LT

## 2017-12-15 PROCEDURE — 36415 COLL VENOUS BLD VENIPUNCTURE: CPT | Performed by: FAMILY MEDICINE

## 2017-12-15 PROCEDURE — 80053 COMPREHEN METABOLIC PANEL: CPT | Performed by: FAMILY MEDICINE

## 2017-12-15 PROCEDURE — 84443 ASSAY THYROID STIM HORMONE: CPT | Performed by: FAMILY MEDICINE

## 2017-12-15 PROCEDURE — 99214 OFFICE O/P EST MOD 30 MIN: CPT | Performed by: FAMILY MEDICINE

## 2017-12-15 PROCEDURE — 83036 HEMOGLOBIN GLYCOSYLATED A1C: CPT | Performed by: FAMILY MEDICINE

## 2017-12-15 RX ORDER — GLIPIZIDE 10 MG/1
10 TABLET, FILM COATED, EXTENDED RELEASE ORAL DAILY
Qty: 90 TABLET | Refills: 0 | Status: SHIPPED | OUTPATIENT
Start: 2017-12-15 | End: 2018-03-09

## 2017-12-15 ASSESSMENT — ANXIETY QUESTIONNAIRES
6. BECOMING EASILY ANNOYED OR IRRITABLE: MORE THAN HALF THE DAYS
5. BEING SO RESTLESS THAT IT IS HARD TO SIT STILL: SEVERAL DAYS
1. FEELING NERVOUS, ANXIOUS, OR ON EDGE: SEVERAL DAYS
3. WORRYING TOO MUCH ABOUT DIFFERENT THINGS: SEVERAL DAYS
GAD7 TOTAL SCORE: 9
2. NOT BEING ABLE TO STOP OR CONTROL WORRYING: SEVERAL DAYS
7. FEELING AFRAID AS IF SOMETHING AWFUL MIGHT HAPPEN: NOT AT ALL

## 2017-12-15 ASSESSMENT — PATIENT HEALTH QUESTIONNAIRE - PHQ9
5. POOR APPETITE OR OVEREATING: NEARLY EVERY DAY
SUM OF ALL RESPONSES TO PHQ QUESTIONS 1-9: 11

## 2017-12-15 NOTE — PROGRESS NOTES
HPI   SUBJECTIVE:   Susan Dietrich is a 57 year old female who presents to clinic today for the following health issues:    Diabetes Follow-up      Patient is checking blood sugars: not at all, checked it a couple of times last week    Diabetic concerns: other - patient's eyes are bothering her.      Symptoms of hypoglycemia (low blood sugar): none     Paresthesias (numbness or burning in feet) or sores: No     Date of last diabetic eye exam: 01/05/17    The patient remains on both Metformin and Glipazide. She does not regularly check her sugars.     BP Readings from Last 2 Encounters:   12/15/17 112/80   09/12/17 112/82     Hemoglobin A1C (%)   Date Value   09/12/2017 7.8 (H)   02/27/2017 7.4 (H)     LDL Cholesterol Calculated (mg/dL)   Date Value   09/12/2017 46   02/27/2017 49     Depression Followup    Status since last visit: Stable     See PHQ-9 for current symptoms.  Other associated symptoms: None    Complicating factors:   Significant life event:  No   Current substance abuse:  None  Anxiety or Panic symptoms:  No    PHQ-9 Score and MyChart F/U Questions 9/19/2016 2/27/2017 9/12/2017   Total Score 8 7 11   Q9: Suicide Ideation Not at all Not at all Not at all       PHQ-9  English  PHQ-9   Any Language  Suicide Assessment Five-step Evaluation and Treatment (SAFE-T)        Amount of exercise or physical activity: did a couple times last week.     Problems taking medications regularly: No    Medication side effects: none  Diet: regular (no restrictions)    Susan says she does not like answering the question about whether or not she is going to hurt herself, as she would never hurt herself ,but does wonder if she was never born.     Joint Pain    Onset: at least a month     Description:   Location: left knee  Character: Sharp    Intensity: severe, 8/10    Progression of Symptoms: worse    Accompanying Signs & Symptoms:  Other symptoms: none    History:   Previous similar pain: YES      Precipitating factors:  "  Trauma or overuse: no     Alleviating factors:  Improved by: nothing    Therapies Tried and outcome: NOTHING     The pt reports that in the past, about once a month, her left knee would bother her, especially when going down steps. The pain is more constant now, and generally located in the center of her knee, but sometimes reaches down into the bottom part of her left knee. Patient reports that the act of sitting down (eg sitting down on the toilet) causes the most discomfort, but standing up does not. It used to be that this pain would occur on one day, and then clear up within that same day, but it has been more constant recently. She bikes for exercise (3-4 times a week), but says that her pain does not come on when doing this. She walks up stairs at work. Reports no swollenness. Describes the pain as a stabbing pain, feels almost like she needs her knee \"scraped off\". Hurts when she tenses very quickly. No pain in the back of the knee . Has been sitting with legs reclined a lot while crocheting.     Cholesterol  She remains on Lipitor for cholesterol.     Eyes  Gets eyes checked by an ophtalmalogist yearly as part of diabetes management.     Weight  Still on Topamax.     Dental   Cracked root, will have tooth removed soon.     Colon cancer screening  Has not done her FIT test yet, and will need a new one due to expiration.     MARK Davis has not yet returned to working 4 days a week (she would need to submit her physician's note to do so) due to concern of retaliation from her boss.     FHx   has had a knee replacement. Patient's sister has had measured HDL values of over 100.     ADHD  Patient wonders whether she might have an attention deficit problem, as she  Is constantly playing with items at work (eg pens) or moving her feet, hands, legs, etc. Other people have also pointed this out to her.     PROBLEMS TO ADD ON...    Problem list and histories reviewed & adjusted, as indicated.  Additional " history: as documented    BP Readings from Last 3 Encounters:   12/15/17 112/80   09/12/17 112/82   02/27/17 116/80    Wt Readings from Last 3 Encounters:   09/12/17 91.6 kg (202 lb)   02/27/17 90.5 kg (199 lb 8 oz)   09/19/16 92.3 kg (203 lb 8 oz)         Labs reviewed in EPIC      Reviewed and updated as needed this visit by clinical staffTobacco  Allergies  Meds  Problems  Med Hx  Surg Hx  Fam Hx  Soc Hx        Reviewed and updated as needed this visit by Provider       ROS:  Constitutional, HEENT, cardiovascular, pulmonary, gi and gu systems are negative, except as otherwise noted.    Numbness in fingertips, attributed to cold weather.     This document serves as a record of the services and decisions personally performed and made by Concha Lucas MD. It was created on his/her behalf by Ike Watters, a trained medical scribe. The creation of this document is based the provider's statements to the medical scribe.  Scribe Ike Watters 8:14 AM, December 15, 2017  OBJECTIVE:     /80 (BP Location: Right arm, Patient Position: Chair, Cuff Size: Adult Large)  Pulse 83  Temp 98.4  F (36.9  C) (Oral)  Resp 20  LMP 10/03/2010  SpO2 97%  There is no height or weight on file to calculate BMI.  GENERAL: healthy, alert and no distress  RESP: lungs clear to auscultation - no rales, rhonchi or wheezes  CV: regular rate and rhythm, normal S1 S2, no S3 or S4, no murmur, click or rub, no peripheral edema and peripheral pulses strong  MS: no gross musculoskeletal defects noted, no edema. Right leg: negative straight leg raise, full ROM  Knee cap pain with pressure, otherwise normal knee exam bilt  SKIN: no suspicious lesions or rashes  NEURO: Normal strength and tone, mentation intact and speech normal  PSYCH: mentation appears normal, affect normal/bright    Diagnostic Test Results:  none     ASSESSMENT/PLAN:     (E11.40,  E11.65) Type 2 diabetes, uncontrolled, with neuropathy (H)  (primary encounter  diagnosis)  Comment: Measure Hb A1c today along with other labs in order to evaluate type 2 diabetes further and make recommendations for the future.   Plan: Hemoglobin A1c, TSH with free T4 reflex,         Comprehensive metabolic panel (BMP + Alb, Alk         Phos, ALT, AST, Total. Bili, TP)          (I10) HTN, goal below 140/90  Comment: Patient's BP as measured in clinic today is 112/80; well-controlled on Lisinopril. Continue on medication at current dose and run comprehensive metabolic panel today. Work on healthy diet (eg Mediterranean, healthy fats, fruits, vegetables, etc)  Plan: Comprehensive metabolic panel (BMP + Alb, Alk         Phos, ALT, AST, Total. Bili, TP). Lisinopril (Prinvil/Zestril)  and diet and lifestyle modifications.           (F32.1) Moderate major depression (H)  Comment: Patient still has moderate depression, but does not want to try any new medication at this time. Remain on Wellbutrin 150 mg for the time being, and RTC if depression symptoms become worse or interfere with daily activities. Highly recommend new med or add on med, and or refer to talk therpay  Plan: BuPropion (Wellbutrin) 150 mg    (M22.2X2) Patellofemoral disorder of left knee  Comment: Run x-ray on left knee today to evaluate pain and discomfort further, suspect that a musculoskeletal abnormality with knee cap alignment is causing the pain, recommend PT and chiropractic referral as well to address issue.   Plan: XR Knee Left 3 Views, [facility] PT, HAND, AND         CHIROPRACTIC REFERRAL            (Z12.11) Screen for colon cancer  Comment: Patient is aware that she is due for a colon screening, and will submit a FIT test in the future.  Plan: Fecal colorectal cancer screen FIT - Future         (S+30)         RTC in 3-4 months for evaluation.     The information in this document, created by the medical scribe for me, accurately reflects the services I personally performed and the decisions made by me. I have reviewed and  approved this document for accuracy prior to leaving the patient care area.  Concha Lucas MD  8:14 AM, 12/15/17    Concha Lucas MD  [facility]    Physical Exam

## 2017-12-15 NOTE — MR AVS SNAPSHOT
After Visit Summary   12/15/2017    Susan Dietrich    MRN: [id number]           Patient Information     Date Of Birth          1960        Visit Information        Provider Department      12/15/2017 8:00 AM Concha Lucas MD [facility]        Today's Diagnoses     Type 2 diabetes, uncontrolled, with neuropathy (H)    -  1    HTN, goal below 140/90        Moderate major depression (H)        Patellofemoral disorder of left knee        Screen for colon cancer           Follow-ups after your visit        Additional Services     [facility] PT, HAND, AND CHIROPRACTIC REFERRAL       **This order will print in the [facility] Scheduling Office**    Physical Therapy, Hand Therapy and Chiropractic Care are available through:    *[facility]  *[facility]  *[facility]    Call one number to schedule at any of the above locations: [phone].    Your provider has referred you to: Physical Therapy at [facility] or [facility]    Indication/Reason for Referral: Knee Pain  Onset of Illness: months  Therapy Orders: Evaluate and Treat  Special Programs: None  Special Request: None    Ruma Hendrix      Additional Comments for the Therapist or Chiropractor:     Please be aware that coverage of these services is subject to the terms and limitations of your health insurance plan.  Call member services at your health plan with any benefit or coverage questions.      Please bring the following to your appointment:    *Your personal calendar for scheduling future appointments  *Comfortable clothing                  Follow-up notes from your care team     Return in about 3 months (around 3/15/2018).      Future tests that were ordered for you today     Open Future Orders        Priority Expected Expires Ordered    Fecal colorectal cancer screen FIT - Future (S+30) Routine 1/5/2018 1/14/2018 12/15/2017    XR Knee Left 3 Views Routine 12/15/2017 12/15/2018  12/15/2017            Who to contact     If you have questions or need follow up information about today's clinic visit or your schedule please contact [facility] directly at [phone].  Normal or non-critical lab and imaging results will be communicated to you by MyChart, letter or phone within 4 business days after the clinic has received the results. If you do not hear from us within 7 days, please contact the clinic through MyChart or phone. If you have a critical or abnormal lab result, we will notify you by phone as soon as possible.  Submit refill requests through [employer] or call your pharmacy and they will forward the refill request to us. Please allow 3 business days for your refill to be completed.          Additional Information About Your Visit        P[employer] Information     [employer] gives you secure access to your electronic health record. If you see a primary care provider, you can also send messages to your care team and make appointments. If you have questions, please call your primary care clinic.  If you do not have a primary care provider, please call [phone] and they will assist you.        Care EveryWhere ID     This is your Care EveryWhere ID. This could be used by other organizations to access your [facility] medical records  [id number]        Your Vitals Were     Pulse Temperature Respirations Last Period Pulse Oximetry       83 98.4  F (36.9  C) (Oral) 20 10/03/2010 97%        Blood Pressure from Last 3 Encounters:   12/15/17 112/80   09/12/17 112/82   02/27/17 116/80    Weight from Last 3 Encounters:   09/12/17 202 lb (91.6 kg)   02/27/17 199 lb 8 oz (90.5 kg)   09/19/16 203 lb 8 oz (92.3 kg)              We Performed the Following     Comprehensive metabolic panel (BMP + Alb, Alk Phos, ALT, AST, Total. Bili, TP)     Hemoglobin A1c     [facility] PT, HAND, AND CHIROPRACTIC REFERRAL     TSH with free T4 reflex        Primary Care Provider Office Phone # Fax #    Concha  Kristin Lucas [phone]       [id number]  [address]   [facility] [address]        Equal Access to Services     YARELI CRAIG : Hadii aad ku hadaddismike Sauer, warosangelada rosinadanielha, qashaylata kaalmada fredy, patti lizain hayaacassidy wadelorraine montes de oca erica heredia. So [facility] [phone].    ATENCIÓN: Si habla español, tiene a langley disposición servicios gratuitos de asistencia lingüística. Llame al [phone].    We comply with applicable federal civil rights laws and [place] laws. We do not discriminate on the basis of race, color, national origin, age, disability, sex, sexual orientation, or gender identity.            Thank you!     Thank you for choosing [facility]  for your care. Our goal is always to provide you with excellent care. Hearing back from our patients is one way we can continue to improve our services. Please take a few minutes to complete the written survey that you may receive in the mail after your visit with us. Thank you!             Your Updated Medication List - Protect others around you: Learn how to safely use, store and throw away your medicines at www.disposemymeds.org.          This list is accurate as of: 12/15/17  8:31 AM.  Always use your most recent med list.                   Brand Name Dispense Instructions for use Diagnosis    aspirin 325 MG EC tablet     100 tablet    Take 1 tablet (325 mg) by mouth daily    Routine general medical examination at a health care facility       atorvastatin 10 MG tablet    LIPITOR    90 tablet    Take 1 tablet (10 mg) by mouth daily    Hyperlipidemia LDL goal <100       buPROPion 150 MG 24 hr tablet    WELLBUTRIN XL    90 tablet    Take 1 tablet (150 mg) by mouth every morning    Major depressive disorder, recurrent episode, moderate (H)       cholecalciferol 5000 UNITS Caps     90 capsule    Take 1 capsule by mouth daily.    Vitamin D deficiency       cholestyramine light 4 GM powder    PREVALITE    231 g    1 LEVEL SCOOPFUL DAILY  IN LIQUID    Hyperlipidemia LDL goal <100       ciclopirox 8 % Soln     3 Bottle    Externally apply topically daily    Onychomycosis       fluticasone 50 MCG/ACT spray    FLONASE    3 g    Spray 1-2 sprays into both nostrils daily    Chronic maxillary sinusitis       glipiZIDE 5 MG 24 hr tablet    glipiZIDE XL    90 tablet    Take 1 tablet (5 mg) by mouth daily    Type 2 diabetes, uncontrolled, with neuropathy (H)       lisinopril 10 MG tablet    PRINIVIL/ZESTRIL    90 tablet    Take 1 tablet (10 mg) by mouth daily    Type 2 diabetes, uncontrolled, with neuropathy (H)       metFORMIN 500 MG tablet    GLUCOPHAGE    360 tablet    Take 2 tablets (1,000 mg) by mouth 2 times daily (with meals)    Type 2 diabetes, uncontrolled, with neuropathy (H)       * ONETOUCH ULTRA test strip   Generic drug:  blood glucose monitoring     300 strip    by In Vitro route 3 times daily. Test as directed 3 times daily    Type II or unspecified type diabetes mellitus without mention of complication, not stated as uncontrolled       * blood glucose monitoring test strip    TRUETEST    3 Box    by In Vitro route 3 times daily.    Type 2 diabetes, uncontrolled, with neuropathy (H)       order for DME     1 each    Orthotic shoes, pt with diabetes and peripheral neuropathy    Type 2 diabetes, HbA1C goal < 8% (H)       topiramate 50 MG tablet    TOPAMAX    180 tablet    Take 1 tablet (50 mg) by mouth 2 times daily    Morbid obesity due to excess calories (H)       TRUEPLUS LANCETS 33G Misc     3 Month    1 strip 3 times daily TRUETEST LANCETS to be used with the Trutest test strips.    Type 2 diabetes, uncontrolled, with neuropathy (H)       * Notice:  This list has 2 medication(s) that are the same as other medications prescribed for you. Read the directions carefully, and ask your doctor or other care provider to review them with you.

## 2017-12-15 NOTE — NURSING NOTE
"Chief Complaint   Patient presents with     Diabetes     Blood Draw     LABS.  Patient is fasting     Initial /80 (BP Location: Right arm, Patient Position: Chair, Cuff Size: Adult Large)  Pulse 83  Temp 98.4  F (36.9  C) (Oral)  Resp 20  LMP 10/03/2010  SpO2 97% Estimated body mass index is 33.61 kg/(m^2) as calculated from the following:    Height as of 2/27/17: 5' 5\" (1.651 m).    Weight as of 9/12/17: 202 lb (91.6 kg).  BP completed using cuff size large right arm.    Patient declined weight.    Lisa Magill, CMA    "

## 2017-12-16 LAB
ALBUMIN SERPL-MCNC: 3.7 G/DL (ref 3.4–5)
ALP SERPL-CCNC: 73 U/L (ref 40–150)
ALT SERPL W P-5'-P-CCNC: 38 U/L (ref 0–50)
ANION GAP SERPL CALCULATED.3IONS-SCNC: 12 MMOL/L (ref 3–14)
AST SERPL W P-5'-P-CCNC: 23 U/L (ref 0–45)
BILIRUB SERPL-MCNC: 0.7 MG/DL (ref 0.2–1.3)
BUN SERPL-MCNC: 17 MG/DL (ref 7–30)
CALCIUM SERPL-MCNC: 9.2 MG/DL (ref 8.5–10.1)
CHLORIDE SERPL-SCNC: 109 MMOL/L (ref 94–109)
CO2 SERPL-SCNC: 18 MMOL/L (ref 20–32)
CREAT SERPL-MCNC: 0.73 MG/DL (ref 0.52–1.04)
GFR SERPL CREATININE-BSD FRML MDRD: 82 ML/MIN/1.7M2
GLUCOSE SERPL-MCNC: 152 MG/DL (ref 70–99)
POTASSIUM SERPL-SCNC: 4.1 MMOL/L (ref 3.4–5.3)
PROT SERPL-MCNC: 7.6 G/DL (ref 6.8–8.8)
SODIUM SERPL-SCNC: 139 MMOL/L (ref 133–144)
TSH SERPL DL<=0.005 MIU/L-ACNC: 1.41 MU/L (ref 0.4–4)

## 2017-12-16 ASSESSMENT — ANXIETY QUESTIONNAIRES: GAD7 TOTAL SCORE: 9

## 2017-12-21 ENCOUNTER — TELEPHONE (OUTPATIENT)
Dept: FAMILY MEDICINE | Facility: CLINIC | Age: 57
End: 2017-12-21

## 2017-12-22 DIAGNOSIS — Z12.11 SCREEN FOR COLON CANCER: ICD-10-CM

## 2017-12-22 PROCEDURE — 82274 ASSAY TEST FOR BLOOD FECAL: CPT | Performed by: FAMILY MEDICINE

## 2017-12-24 LAB — HEMOCCULT STL QL IA: NEGATIVE

## 2018-02-16 ENCOUNTER — TELEPHONE (OUTPATIENT)
Dept: FAMILY MEDICINE | Facility: CLINIC | Age: 58
End: 2018-02-16

## 2018-02-16 NOTE — TELEPHONE ENCOUNTER
Panel Management Review      Patient has the following on her problem list:     Depression / Dysthymia review    Measure:  Needs PHQ-9 score of 4 or less during index window.  Administer PHQ-9 and if score is 5 or more, send encounter to provider for next steps.    5   7 month window range: PHQ-9 due NOW      PHQ-9 SCORE 2/27/2017 9/12/2017 12/15/2017   Total Score - - -   Total Score 7 11 11       If PHQ-9 recheck is 5 or more, route to provider for next steps.    Patient is due for:  PHQ9    Diabetes    ASA: Passed    Last A1C  Lab Results   Component Value Date    A1C 8.5 12/15/2017    A1C 7.8 09/12/2017    A1C 7.4 02/27/2017    A1C 7.0 09/19/2016    A1C 8.4 06/17/2016     A1C tested: FAILED    Last LDL:    Lab Results   Component Value Date    CHOL 119 09/12/2017     Lab Results   Component Value Date    HDL 47 09/12/2017     Lab Results   Component Value Date    LDL 46 09/12/2017     Lab Results   Component Value Date    TRIG 132 09/12/2017     Lab Results   Component Value Date    CHOLHDLRATIO 3.1 08/24/2015     Lab Results   Component Value Date    NHDL 72 09/12/2017       Is the patient on a Statin? YES             Is the patient on Aspirin? YES    Medications     HMG CoA Reductase Inhibitors    atorvastatin (LIPITOR) 10 MG tablet    Salicylates    aspirin 325 MG EC tablet          Last three blood pressure readings:  BP Readings from Last 3 Encounters:   12/15/17 112/80   09/12/17 112/82   02/27/17 116/80       Date of last diabetes office visit: 12/15/17     Tobacco History:     History   Smoking Status     Former Smoker     Packs/day: 1.00     Years: 5.00     Types: Cigarettes     Quit date: 1/4/1985   Smokeless Tobacco     Never Used     Comment: quit in 1985 smoked one pack a day         Hypertension   Last three blood pressure readings:  BP Readings from Last 3 Encounters:   12/15/17 112/80   09/12/17 112/82   02/27/17 116/80     Blood pressure: Passed    HTN Guidelines:  Age 18-59 BP range:  Less  than 140/90  Age 60-85 with Diabetes:  Less than 140/90  Age 60-85 without Diabetes:  less than 150/90      Composite cancer screening  Chart review shows that this patient is due/due soon for the following None  Summary:    Patient is due/failing the following:   A1C, FOLLOW UP and LDL    Action needed:   Patient needs office visit for DIABETES MANAGEMENT(due around 03/15/18) .    Type of outreach:    Sent [employer] message.    Questions for provider review:    None                                                                                                                                    Lisa Magill, CMA       Chart routed to Care Team .

## 2018-03-09 ENCOUNTER — OFFICE VISIT (OUTPATIENT)
Dept: FAMILY MEDICINE | Facility: CLINIC | Age: 58
End: 2018-03-09
Payer: COMMERCIAL

## 2018-03-09 VITALS
OXYGEN SATURATION: 96 % | HEIGHT: 66 IN | RESPIRATION RATE: 16 BRPM | TEMPERATURE: 98.5 F | DIASTOLIC BLOOD PRESSURE: 74 MMHG | WEIGHT: 206 LBS | HEART RATE: 80 BPM | SYSTOLIC BLOOD PRESSURE: 96 MMHG | BODY MASS INDEX: 33.11 KG/M2

## 2018-03-09 DIAGNOSIS — E66.01 MORBID OBESITY DUE TO EXCESS CALORIES (H): Chronic | ICD-10-CM

## 2018-03-09 DIAGNOSIS — E78.5 HYPERLIPIDEMIA LDL GOAL <100: ICD-10-CM

## 2018-03-09 DIAGNOSIS — Z13.89 SCREENING FOR DIABETIC PERIPHERAL NEUROPATHY: ICD-10-CM

## 2018-03-09 DIAGNOSIS — F33.1 MAJOR DEPRESSIVE DISORDER, RECURRENT EPISODE, MODERATE (H): ICD-10-CM

## 2018-03-09 DIAGNOSIS — B35.1 TOENAIL FUNGUS: ICD-10-CM

## 2018-03-09 LAB
CHOLEST SERPL-MCNC: 116 MG/DL
HBA1C MFR BLD: 7.8 % (ref 4.3–6)
HDLC SERPL-MCNC: 47 MG/DL
LDLC SERPL CALC-MCNC: 42 MG/DL
NONHDLC SERPL-MCNC: 69 MG/DL
TRIGL SERPL-MCNC: 135 MG/DL

## 2018-03-09 PROCEDURE — 99214 OFFICE O/P EST MOD 30 MIN: CPT | Performed by: FAMILY MEDICINE

## 2018-03-09 PROCEDURE — 80061 LIPID PANEL: CPT | Performed by: FAMILY MEDICINE

## 2018-03-09 PROCEDURE — 83036 HEMOGLOBIN GLYCOSYLATED A1C: CPT | Performed by: FAMILY MEDICINE

## 2018-03-09 PROCEDURE — 99207 C FOOT EXAM  NO CHARGE: CPT | Performed by: FAMILY MEDICINE

## 2018-03-09 PROCEDURE — 36415 COLL VENOUS BLD VENIPUNCTURE: CPT | Performed by: FAMILY MEDICINE

## 2018-03-09 RX ORDER — GLIPIZIDE 10 MG/1
10 TABLET, FILM COATED, EXTENDED RELEASE ORAL DAILY
Qty: 90 TABLET | Refills: 1 | Status: SHIPPED | OUTPATIENT
Start: 2018-03-09 | End: 2018-07-17

## 2018-03-09 RX ORDER — LISINOPRIL 10 MG/1
10 TABLET ORAL DAILY
Qty: 90 TABLET | Refills: 1 | Status: CANCELLED | OUTPATIENT
Start: 2018-03-09

## 2018-03-09 RX ORDER — LISINOPRIL 5 MG/1
5 TABLET ORAL DAILY
Qty: 90 TABLET | Refills: 1 | Status: SHIPPED | OUTPATIENT
Start: 2018-03-09 | End: 2018-09-20

## 2018-03-09 RX ORDER — BUPROPION HYDROCHLORIDE 150 MG/1
150 TABLET ORAL EVERY MORNING
Qty: 90 TABLET | Refills: 1 | Status: SHIPPED | OUTPATIENT
Start: 2018-03-09 | End: 2018-09-20

## 2018-03-09 RX ORDER — LISINOPRIL 5 MG/1
5 TABLET ORAL DAILY
Qty: 90 TABLET | Refills: 1 | Status: SHIPPED | OUTPATIENT
Start: 2018-03-09 | End: 2018-03-09

## 2018-03-09 RX ORDER — ATORVASTATIN CALCIUM 10 MG/1
10 TABLET, FILM COATED ORAL DAILY
Qty: 90 TABLET | Refills: 3 | Status: SHIPPED | OUTPATIENT
Start: 2018-03-09 | End: 2018-09-20

## 2018-03-09 RX ORDER — TOPIRAMATE 50 MG/1
50 TABLET, FILM COATED ORAL 2 TIMES DAILY
Qty: 180 TABLET | Refills: 1 | Status: SHIPPED | OUTPATIENT
Start: 2018-03-09 | End: 2018-09-20

## 2018-03-09 ASSESSMENT — PATIENT HEALTH QUESTIONNAIRE - PHQ9
SUM OF ALL RESPONSES TO PHQ QUESTIONS 1-9: 9
SUM OF ALL RESPONSES TO PHQ QUESTIONS 1-9: 9

## 2018-03-09 NOTE — MR AVS SNAPSHOT
After Visit Summary   3/9/2018    Susan Dietrich    MRN: [id number]           Patient Information     Date Of Birth          1960        Visit Information        Provider Department      3/9/2018 8:40 AM Concha Lucas MD [facility]        Today's Diagnoses     Type 2 diabetes, uncontrolled, with neuropathy    -  1    Major depressive disorder, recurrent episode, moderate (H)        obesity due to excess calories (H)        Hyperlipidemia LDL goal <100        Screening for diabetic peripheral neuropathy        Toenail fungus          Care Instructions    Goal is a1c <8    Eating Heart-Healthy Food: Using the DASH Plan    Eating for your heart doesn t have to be hard or boring. You just need to know how to make healthier choices. The DASH eating plan has been developed to help you do just that. DASH stands for Dietary Approaches to Stop Hypertension. It is a plan that has been proven to be healthier for your heart and to lower your risk for high blood pressure. It can also help lower your risk for cancer, heart disease, osteoporosis, and diabetes.  Choosing from each food group  Choose foods from each of the food groups below each day. Try to get the recommended number of servings for each food group. The serving numbers are based on a diet of 2,000 calories a day. Talk to your doctor if you re unsure about your calorie needs. Along with getting the correct servings, the DASH plan also recommends a sodium intake less than 2,300 mg per day.        Grains  Servings: 6 to 8 a day  A serving is:    1 slice bread    1 ounce dry cereal    Half a cup cooked rice, pasta or cereal  Best choices: Whole grains and any grains high in fiber. Vegetables  Servings: 4 to 5 a day  A serving is:    1 cup raw leafy vegetable    Half a cup cut-up raw or cooked vegetable    Half a cup vegetable juice  Best choices: Fresh or frozen vegetables prepared without added salt or fat.    Fruits  Servings: 4 to 5 a day  A serving is:    1 medium fruit    One-quarter cup dried fruit    Half a cup fresh, frozen, or canned fruit    Half a cup of 100% fruit juices  Best choices: A variety of fresh fruits of different colors. Whole fruits are a better choice than fruit juices. Low-fat or fat-free dairy  Servings: 2 to 3 a day  A serving is:    1 cup milk    1 cup yogurt    One and a half ounces cheese  Best choices: Skim or 1% milk, low-fat or fat-free yogurt or buttermilk, and low-fat cheeses.         Lean meats, poultry, fish  Servings: 6 or fewer a day  A serving is:    1 ounce cooked meats, poultry, or fish    1 egg  Best choices: Lean poultry and fish. Trim away visible fat. Broil, grill, roast, or boil instead of frying. Remove skin from poultry before eating. Limit how much red meat you eat.  Nuts, seeds, beans  Servings: 4 to 5 a week  A serving is:    One-third cup nuts (one and a half ounces)    2 tablespoons nut butter or seeds    Half a cup cooked dry beans or legumes  Best choices: Dry roasted nuts with no salt added, lentils, kidney beans, garbanzo beans, and whole carr beans.   Fats and oils  Servings: 2 to 3 a day  A serving is:    1 teaspoon vegetable oil    1 teaspoon soft margarine    1 tablespoon mayonnaise    2 tablespoons salad dressing  Best choices: Nut and vegetable oils (nontropical vegetable oils), such as olive and canola oil. Sweets  Servings: 5 a week or fewer  A serving is:    1 tablespoon sugar, maple syrup, or honey    1 tablespoon jam or jelly    1 half-ounce jelly beans (about 15)    1 cup lemonade  Best choices: Dried fruit can be a satisfying sweet. Choose low-fat sweets. And watch your serving sizes!      For more on the DASH eating plan, visit:  www.nhlbi.nih.gov/health/health-topics/topics/dash   Date Last Reviewed: 6/1/2016 2000-2017 [employer]. [address], [place] [address]. All rights reserved. This information is not intended as  a substitute for professional medical care. Always follow your healthcare professional's instructions.                Follow-ups after your visit        Additional Services     PODIATRY/FOOT & ANKLE SURGERY REFERRAL       Your provider has referred you to: [place]: [facility] [phone]   [email or web address]/[place]/    Please be aware that coverage of these services is subject to the terms and limitations of your health insurance plan.  Call member services at your health plan with any benefit or coverage questions.      Please bring the following to your appointment:  >>   Any x-rays, CTs or MRIs which have been performed.  Contact the facility where they were done to arrange for  prior to your scheduled appointment.    >>   List of current medications   >>   This referral request   >>   Any documents/labs given to you for this referral                  Follow-up notes from your care team     Return in about 6 months (around 9/9/2018) for f/u 3 months if diabetes not controlled.      Who to contact     If you have questions or need follow up information about today's clinic visit or your schedule please contact [facility] directly at [phone].  Normal or non-critical lab and imaging results will be communicated to you by MyChart, letter or phone within 4 business days after the clinic has received the results. If you do not hear from us within 7 days, please contact the clinic through [employer] or phone. If you have a critical or abnormal lab result, we will notify you by phone as soon as possible.  Submit refill requests through [employer] or call your pharmacy and they will forward the refill request to us. Please allow 3 business days for your refill to be completed.          Additional Information About Your Visit        [employer] Information     [employer] gives you secure access to your electronic health record. If you see a primary care  "provider, you can also send messages to your care team and make appointments. If you have questions, please call your primary care clinic.  If you do not have a primary care provider, please call [phone] and they will assist you.        Care EveryWhere ID     This is your Care EveryWhere ID. This could be used by other organizations to access your [place] medical records  [id number]        Your Vitals Were     Pulse Temperature Respirations Height Last Period Pulse Oximetry    80 98.5  F (36.9  C) (Oral) 16 5' 5.5\" (1.664 m) 10/03/2010 96%    BMI (Body Mass Index)                   33.76 kg/m2            Blood Pressure from Last 3 Encounters:   03/09/18 96/74   12/15/17 112/80   09/12/17 112/82    Weight from Last 3 Encounters:   03/09/18 206 lb (93.4 kg)   09/12/17 202 lb (91.6 kg)   02/27/17 199 lb 8 oz (90.5 kg)              We Performed the Following     FOOT EXAM  NO CHARGE [04576.114]     HEMOGLOBIN A1C     Lipid panel reflex to direct LDL Fasting     PODIATRY/FOOT & ANKLE SURGERY REFERRAL          Today's Medication Changes          These changes are accurate as of 3/9/18  9:26 AM.  If you have any questions, ask your nurse or doctor.               These medicines have changed or have updated prescriptions.        Dose/Directions    * lisinopril 10 MG tablet   Commonly known as:  PRINIVIL/ZESTRIL   This may have changed:  Another medication with the same name was added. Make sure you understand how and when to take each.   Used for:  Type 2 diabetes, uncontrolled, with neuropathy (H)   Changed by:  Concha Lucas MD        Dose:  10 mg   Take 1 tablet (10 mg) by mouth daily   Quantity:  90 tablet   Refills:  1       * lisinopril 5 MG tablet   Commonly known as:  PRINIVIL/ZESTRIL   This may have changed:  You were already taking a medication with the same name, and this prescription was added. Make sure you understand how and when to take each.   Used for:  Type 2 diabetes, uncontrolled, with " neuropathy (H)   Changed by:  Concha Lucas MD        Dose:  5 mg   Take 1 tablet (5 mg) by mouth daily   Quantity:  90 tablet   Refills:  1       * Notice:  This list has 2 medication(s) that are the same as other medications prescribed for you. Read the directions carefully, and ask your doctor or other care provider to review them with you.         Where to get your medicines      These medications were sent to [facility] [address] [place] [address]  [address] [place] [address]     Phone:  [phone]     atorvastatin 10 MG tablet    buPROPion 150 MG 24 hr tablet    glipiZIDE 10 MG 24 hr tablet    metFORMIN 500 MG tablet    topiramate 50 MG tablet         Some of these will need a paper prescription and others can be bought over the counter.  Ask your nurse if you have questions.     Bring a paper prescription for each of these medications     lisinopril 5 MG tablet                Primary Care Provider Office Phone # Fax #    Concha Lucas [phone]       [id number]  [address]   [facility] [id number]        Equal Access to Services     [facility] AH: Hadii aad ku hadasho Soomaali, waaxda luqadaha, qaybta kaalmada adeegyada, waxay viviana maldonado . So [facility] [phone].    ATENCIÓN: Si habla español, tiene a langley disposición servicios gratLincoln County Medical Centeros de asistencia lingüística. [facility] [phone].    We comply with applicable federal civil rights laws and [place] laws. We do not discriminate on the basis of race, color, national origin, age, disability, sex, sexual orientation, or gender identity.            Thank you!     Thank you for choosing [facility]  for your care. Our goal is always to provide you with excellent care. Hearing back from our patients is one way we can continue to improve our services. Please take a few minutes to complete the written survey that you may receive in the mail after your visit with  us. Thank you!             Your Updated Medication List - Protect others around you: Learn how to safely use, store and throw away your medicines at www.disposemymeds.org.          This list is accurate as of 3/9/18  9:26 AM.  Always use your most recent med list.                   Brand Name Dispense Instructions for use Diagnosis    aspirin 325 MG EC tablet     100 tablet    Take 1 tablet (325 mg) by mouth daily    Routine general medical examination at a health care facility       atorvastatin 10 MG tablet    LIPITOR    90 tablet    Take 1 tablet (10 mg) by mouth daily    Hyperlipidemia LDL goal <100       buPROPion 150 MG 24 hr tablet    WELLBUTRIN XL    90 tablet    Take 1 tablet (150 mg) by mouth every morning    Major depressive disorder, recurrent episode, moderate (H)       cholecalciferol 5000 UNITS Caps     90 capsule    Take 1 capsule by mouth daily.    Vitamin D deficiency       cholestyramine light 4 GM powder    PREVALITE    231 g    1 LEVEL SCOOPFUL DAILY IN LIQUID    Hyperlipidemia LDL goal <100       ciclopirox 8 % Soln     3 Bottle    Externally apply topically daily    Onychomycosis       fluticasone 50 MCG/ACT spray    FLONASE    3 g    Spray 1-2 sprays into both nostrils daily    Chronic maxillary sinusitis       glipiZIDE 10 MG 24 hr tablet    GLUCOTROL XL    90 tablet    Take 1 tablet (10 mg) by mouth daily    Type 2 diabetes, uncontrolled, with neuropathy (H)       * lisinopril 10 MG tablet    PRINIVIL/ZESTRIL    90 tablet    Take 1 tablet (10 mg) by mouth daily    Type 2 diabetes, uncontrolled, with neuropathy (H)       * lisinopril 5 MG tablet    PRINIVIL/ZESTRIL    90 tablet    Take 1 tablet (5 mg) by mouth daily    Type 2 diabetes, uncontrolled, with neuropathy (H)       metFORMIN 500 MG tablet    GLUCOPHAGE    360 tablet    Take 2 tablets (1,000 mg) by mouth 2 times daily (with meals)    Type 2 diabetes, uncontrolled, with neuropathy (H)       * ONETOUCH ULTRA test strip   Generic drug:   blood glucose monitoring     300 strip    by In Vitro route 3 times daily. Test as directed 3 times daily    Type II or unspecified type diabetes mellitus without mention of complication, not stated as uncontrolled       * blood glucose monitoring test strip    TRUETEST    3 Box    by In Vitro route 3 times daily.    Type 2 diabetes, uncontrolled, with neuropathy (H)       order for DME     1 each    Orthotic shoes, pt with diabetes and peripheral neuropathy    Type 2 diabetes, HbA1C goal < 8% (H)       topiramate 50 MG tablet    TOPAMAX    180 tablet    Take 1 tablet (50 mg) by mouth 2 times daily    Morbid obesity due to excess calories (H)       TRUEPLUS LANCETS 33G Misc     3 Month    1 strip 3 times daily TRUETEST LANCETS to be used with the Trutest test strips.    Type 2 diabetes, uncontrolled, with neuropathy (H)       * Notice:  This list has 4 medication(s) that are the same as other medications prescribed for you. Read the directions carefully, and ask your doctor or other care provider to review them with you.

## 2018-03-09 NOTE — PROGRESS NOTES
SUBJECTIVE:   Susan Dietrich is a 57 year old female who presents to clinic today for the following health issues:    Diabetes     Diabetes:     Frequency of checking blood sugars::  Rarely    Diabetic concerns::  None    Hypoglycemia symptoms::  Dizzy and Blurred vision    Paraesthesia present::  No    Eye Exam in the last year::  NO    Diet:  Regular (no restrictions)  Frequency of exercise:  2-3 days/week  Duration of exercise:  15-30 minutes  Taking medications regularly:  Yes  Medication side effects:  None  Additional concerns today:  YES  History of Present Illness     Diabetes:     Frequency of checking blood sugars::  Rarely    Diabetic concerns::  None    Hypoglycemia symptoms::  Dizzy and Blurred vision    Paraesthesia present::  No    Eye Exam in the last year::  NO    Diet:  Regular (no restrictions)  Frequency of exercise:  2-3 days/week  Duration of exercise:  15-30 minutes  Taking medications regularly:  Yes  Medication side effects:  None  Additional concerns today:  YES    Pt presents to clinic today for prescription refills on her diabetes medications. She likes to avoid looking at side effects and warnings associated with medications and has not noticed any adverse effects from the medications she is currently taking. She rarely checks blood sugar levels, given that she appears in clinic at least quarterly, although she does have necessary equipment to check at home. Says that she checks sugars about once every 3 weeks. Mornings are around 110. Has not seen an eye doctor since January 2017 but plans to make an appointment. Reports occasional dizziness (maybe from getting up or moving too quickly) during the middle of the day and blurry vision that she suggests may be due to working on the computer too much. Doesn't think it is due to dehydration (drinks lots of water). She is on a full tablet of Aspirin for diabetic preventative heart disease  , rather than a standard baby Aspirin.  "    Increased Glipizide (5 to 10 mg) after A1C was 8 at last visit.     Lab Results   Component Value Date    A1C 8.5 12/15/2017    A1C 7.8 09/12/2017    A1C 7.4 02/27/2017    A1C 7.0 09/19/2016    A1C 8.4 06/17/2016     Diet  Pt and family do not eat foods with lots of added salts. She does say that she is \"addicted to sweets.\"     Stool   She reports that her stools are not regular, which she thinks may be attributed to her history of dumping syndrome. She takes 1 scoop a day of PrevaLite. Has tried eating yogurt before, but does not like the taste.      Blood pressure   Pt's blood pressure is low today in clinic.     BP Readings from Last 6 Encounters:   03/09/18 96/74   12/15/17 112/80   09/12/17 112/82   02/27/17 116/80   09/19/16 118/88   07/14/16 114/82      Exercise   Exercises 2-4 times a week aerobically, 15-30 minutes at a time. Generally does elliptical. Has an elliptical machine at home.     Skin  Has a few spots on her nose, cheek, and toe that she would like to get checked today.     Hepatitis C  Pt inquires, given her age range, if she should be concerned with hepatitis C screening. Records indicate she already had hep C screening.     Knee  Pt reports that her knee pain has improved, and she canceled an appt for evaluation that she had made because she felt better and did not want going to appt unless she had to.     Colon cancer screen  Pt did FIT test, all normal.     SOC  Job stress has increased recently. Pt would like to work 4 days instead of 5.     Toenail   Pt has a history of fungal infection on her right big toe, leading to a deformed toenail. Topical medication didn't help toenail grow out. Lamasil daily didn't help either.     PROBLEMS TO ADD ON...  Problem list and histories reviewed & adjusted, as indicated.  Additional history: as documented        Recent Labs   Lab Test  12/15/17   0845  09/12/17   0851  02/27/17   0844   06/17/16   0918   A1C  8.5*  7.8*  7.4*   < >  8.4*   LDL   -- " "  46  49   --   51   HDL   --   47*  49*   --   46*   TRIG   --   132  117   --   137   ALT  38  36  30   < >  54*   CR  0.73  0.73  0.76   < >  0.73   GFRESTIMATED  82  83  79   < >  83   GFRESTBLACK  >90  >90  >90  African American GFR Calc     < >  >90   GFR Calc     POTASSIUM  4.1  4.1  4.2   < >  4.2   TSH  1.41   --   1.36   --   1.19    < > = values in this interval not displayed.      BP Readings from Last 3 Encounters:   03/09/18 96/74   12/15/17 112/80   09/12/17 112/82    Wt Readings from Last 3 Encounters:   03/09/18 93.4 kg (206 lb)   09/12/17 91.6 kg (202 lb)   02/27/17 90.5 kg (199 lb 8 oz)         Labs reviewed in EPIC    ROS:  Constitutional, HEENT, cardiovascular, pulmonary, gi and gu systems are negative, except as otherwise noted.    POSITIVE: symptoms of dumping syndrome.     This document serves as a record of the services and decisions personally performed and made by Concha Lucas MD. It was created on his/her behalf by Ike Watters, a trained medical scribe. The creation of this document is based the provider's statements to the medical scribe.  Cheryl Watters 8:52 AM, March 9, 2018  OBJECTIVE:     BP 96/74 (BP Location: Right arm, Patient Position: Sitting, Cuff Size: Adult Large)  Pulse 80  Temp 98.5  F (36.9  C) (Oral)  Resp 16  Ht 1.664 m (5' 5.5\")  Wt 93.4 kg (206 lb)  [facility] 10/03/2010  SpO2 96%  BMI 33.76 kg/m2  Body mass index is 33.76 kg/(m^2).  GENERAL: healthy, alert and no distress  EYES: Eyes grossly normal to inspection, conjunctivae and sclerae normal  RESP: lungs clear to auscultation - no rales, rhonchi or wheezes  CV: regular rate and rhythm, normal S1 S2, no S3 or S4, no murmur, click or rub, no peripheral edema and peripheral pulses strong  MS: no gross musculoskeletal defects noted, no edema  SKIN: no suspicious lesions or rashes. Fungal infection noted right big toe.   NEURO: Normal strength and tone, mentation intact and speech " normal  PSYCH: mentation appears normal, affect normal/bright  DIABETIC FOOT: exam normal, full sensory sensations. Distal pulses intact and normal.     Diagnostic Test Results:  none     ASSESSMENT/PLAN:     (E11.40,  E11.65) Type 2 diabetes, uncontrolled, with neuropathy  (primary encounter diagnosis)  Comment: Pt has history of type 2 diabetes currently controlled on Glipizide and Metformin. Strongly advised pt to check blood sugar levels once a day (pt reports only checking  bout once every 3 weeks), or 2X a week at minimum, randomly one after a large meal and one upon first waking in the morning. Informed pt that with diabetes medication she is on, there could be a risk for low blood sugar levels, so she should check regularly and be aware of levels. Also check levels when feeling hungry, low-energy, sweaty, etc. Low blood sugars can also contribute to blurry vision. Pt also has low BP readings, potentially contributing to dizziness that she describes experiencing. Recommend lowering dose of Lisinopril to manage. Check Hb A1C levels today.   Plan: glipiZIDE (GLUCOTROL XL) 10 MG 24 hr tablet,         metFORMIN (GLUCOPHAGE) 500 MG tablet,         HEMOGLOBIN A1C, lisinopril (PRINIVIL/ZESTRIL) 5        MG tablet, DISCONTINUED: lisinopril         (PRINIVIL/ZESTRIL) 5 MG tablet          (F33.1) Major depressive disorder, recurrent episode, moderate (H)  Comment: Controlled on Wellbutrin, continue on medication.   Plan: buPROPion (WELLBUTRIN XL) 150 MG 24 hr tablet          (E66.01) obesity due to excess calories (H)  Comment: Stable on Topamax, pt reports no adverse effects from medication, continue taking.   Plan: topiramate (TOPAMAX) 50 MG tablet          (E78.5) Hyperlipidemia LDL goal <100  Comment: Well-controlled on Lipitor, continue on medication.   Plan: atorvastatin (LIPITOR) 10 MG tablet, Lipid         panel reflex to direct LDL Fasting       (Z13.89) Screening for diabetic peripheral neuropathy  Comment:  Diabetic foot exam normal, full sensory perception.   Plan: FOOT EXAM  NO CHARGE [10785.114]          (B35.1) Toenail fungus  Comment: Recommend podiatry referral.   Plan: PODIATRY/FOOT & ANKLE SURGERY REFERRAL          Continue to get regular exercise, eat a well-balanced diet rich in high-fiber foods. 5 fruits and vegetables a day.     Heart: recommend that pt reduce her preventative (heart disease) full aspirin dose to a baby aspirin.     Go for eye appt. If blurry vision persists, use saline to help manage, avoid looking at computers for extended periods of time, look into distance 100 ft to try to clear bluriness especially when at a computer for long periods.     RTC in 6 months for evaluation.     The information in this document, created by the medical scribe for me, accurately reflects the services I personally performed and the decisions made by me. I have reviewed and approved this document for accuracy prior to leaving the patient care area.  Concha Lucas MD  8:52 AM, 03/09/18    Concha Lucas MD  [facility]

## 2018-03-09 NOTE — PATIENT INSTRUCTIONS
Goal is a1c <8    Eating Heart-Healthy Food: Using the DASH Plan    Eating for your heart doesn t have to be hard or boring. You just need to know how to make healthier choices. The DASH eating plan has been developed to help you do just that. DASH stands for Dietary Approaches to Stop Hypertension. It is a plan that has been proven to be healthier for your heart and to lower your risk for high blood pressure. It can also help lower your risk for cancer, heart disease, osteoporosis, and diabetes.  Choosing from each food group  Choose foods from each of the food groups below each day. Try to get the recommended number of servings for each food group. The serving numbers are based on a diet of 2,000 calories a day. Talk to your doctor if you re unsure about your calorie needs. Along with getting the correct servings, the DASH plan also recommends a sodium intake less than 2,300 mg per day.        Grains  Servings: 6 to 8 a day  A serving is:    1 slice bread    1 ounce dry cereal    Half a cup cooked rice, pasta or cereal  Best choices: Whole grains and any grains high in fiber. Vegetables  Servings: 4 to 5 a day  A serving is:    1 cup raw leafy vegetable    Half a cup cut-up raw or cooked vegetable    Half a cup vegetable juice  Best choices: Fresh or frozen vegetables prepared without added salt or fat.   Fruits  Servings: 4 to 5 a day  A serving is:    1 medium fruit    One-quarter cup dried fruit    Half a cup fresh, frozen, or canned fruit    Half a cup of 100% fruit juices  Best choices: A variety of fresh fruits of different colors. Whole fruits are a better choice than fruit juices. Low-fat or fat-free dairy  Servings: 2 to 3 a day  A serving is:    1 cup milk    1 cup yogurt    One and a half ounces cheese  Best choices: Skim or 1% milk, low-fat or fat-free yogurt or buttermilk, and low-fat cheeses.         Lean meats, poultry, fish  Servings: 6 or fewer a day  A serving is:    1 ounce cooked meats,  poultry, or fish    1 egg  Best choices: Lean poultry and fish. Trim away visible fat. Broil, grill, roast, or boil instead of frying. Remove skin from poultry before eating. Limit how much red meat you eat.  Nuts, seeds, beans  Servings: 4 to 5 a week  A serving is:    One-third cup nuts (one and a half ounces)    2 tablespoons nut butter or seeds    Half a cup cooked dry beans or legumes  Best choices: Dry roasted nuts with no salt added, lentils, kidney beans, garbanzo beans, and whole carr beans.   Fats and oils  Servings: 2 to 3 a day  A serving is:    1 teaspoon vegetable oil    1 teaspoon soft margarine    1 tablespoon mayonnaise    2 tablespoons salad dressing  Best choices: Nut and vegetable oils (nontropical vegetable oils), such as olive and canola oil. Sweets  Servings: 5 a week or fewer  A serving is:    1 tablespoon sugar, maple syrup, or honey    1 tablespoon jam or jelly    1 half-ounce jelly beans (about 15)    1 cup lemonade  Best choices: Dried fruit can be a satisfying sweet. Choose low-fat sweets. And watch your serving sizes!      For more on the DASH eating plan, visit:  www.nhlbi.nih.gov/health/health-topics/topics/dash   Date Last Reviewed: 6/1/2016 2000-2017 [employer]. [address] [place] [address], [place] [address]. All rights reserved. This information is not intended as a substitute for professional medical care. Always follow your healthcare professional's instructions.

## 2018-03-10 ASSESSMENT — PATIENT HEALTH QUESTIONNAIRE - PHQ9: SUM OF ALL RESPONSES TO PHQ QUESTIONS 1-9: 9

## 2018-03-20 ENCOUNTER — OFFICE VISIT (OUTPATIENT)
Dept: PODIATRY | Facility: CLINIC | Age: 58
End: 2018-03-20
Payer: COMMERCIAL

## 2018-03-20 VITALS
WEIGHT: 206 LBS | BODY MASS INDEX: 33.11 KG/M2 | DIASTOLIC BLOOD PRESSURE: 76 MMHG | SYSTOLIC BLOOD PRESSURE: 108 MMHG | HEIGHT: 66 IN

## 2018-03-20 DIAGNOSIS — B35.1 ONYCHOMYCOSIS OF RIGHT GREAT TOE: ICD-10-CM

## 2018-03-20 PROCEDURE — 99203 OFFICE O/P NEW LOW 30 MIN: CPT | Performed by: PODIATRIST

## 2018-03-20 RX ORDER — THERMOMETER, ELECTRONIC,ORAL
EACH MISCELLANEOUS DAILY
Qty: 30 G | Refills: 3 | Status: SHIPPED | OUTPATIENT
Start: 2018-03-20 | End: 2018-07-17

## 2018-03-20 NOTE — PATIENT INSTRUCTIONS
"Thank you for choosing [facility]!    DR. MANTILLA'S CLINIC LOCATIONS:   MONDAY AM - [place] TUESDAY - [place]   [address] [address] [address] [address] [place] [address]   [phone] / [phone] / [phone]       WEDNESDAY - [place] FRIDAY PM - [place]   [address]   [place] [address] [place] [id number] [phone] / [phone] / [phone]       SCHEDULE SURGERY: [phone]    APPOINTMENTS: [phone]    BILLING QUESTIONS: [phone]      DIABETES AND YOUR FEET  Diabetes can result in several problems in the feet including ulcers (open sores) and amputations. Two of the most important reasons why people develop foot problems when they have diabetes is : 1. Neuropathy (loss of feeling)  2. Vascular disease (loss or decrease of blood flow).    Neuropathy is a term used to describe a loss of nerve function.  Patients with diabetes are at risk of developing neuropathy if their sugars continue to run high and are above the normal value. One theory for neuropathy is that the \"extra\" sugar in the body enters the nerves and is broken down. These by-products build up in the nerve causing it to swell and impairing nerve function. Often times, this can be prevented by controlling your sugars, dieting and exercise.    When a person develops neuropathy, they usually begin to feel numbness or tingling in their feet and sometime in their legs.  Other symptoms may include painful burning or hot feet, tingling or feeling like insects or ants are crawling on your feet or legs.  If the diabetes is sever and the sugars run high for long periods of time, neuropathy can also occur in the hands.    Vascular disease  is a term used to describe a loss or decrease in circulation (blood flow). There is a problem in getting blood and oxygen to areas that need it. Similar to neuropathy, sugars " can build up in the walls of the arteries (blood vessels) and cause them to become swollen, thickened and hardened. This decreases the amount of blood that can go to an area that needs it. Though this is common in the legs of diabetic patients, it can also affect other arteries (blood vessels) in the body such as in the heart and eyes.    In the legs, vascular disease usually results in cramping. Patients who develop leg cramps after walking the same distance every time (i.e. One block, half a mile, ect.) need to let their doctors know so that their circulation may be checked. Cramps causing severe pain in the feet and/or legs while sleeping and the cramps go away when you stand or hang your legs off the side of the bed, may also be a sign of poor blood circulation.  Occasional cramping in cold weather or on rare occasions with activity may not be due to poor circulation, but you should inform your doctor.    PREVENTION OF THESE DISEASES  The key to prevention is good blood sugar control. Poor blood sugar control is a big reason many of these problems start. Physical activity (exercise) is a very good way to help decrease your blood sugars. Exercise can lower your blood sugar, blood pressure, and cholesterol. It also reduces your risk for heart disease and stroke, relieves stress, and strengthens your heart, muscles and bones.  In addition, regular activity helps insulin work better, improves your blood circulation, and keeps your joints flexible. If you're trying to lose weight, a combination of exercise and wise food choices can help you reach your target weight and maintain it.      PAIN MANAGEMENT  1.Blood Sugar Control - Most important  2. Medications such as:  Amytriptylline, duloxetine, gabapentin, lyrica, tramadol  3. Nutritional therapy:  Vitamin B6 (100mg daily), Vitamin B12 (75mcg daily), Vitamin D 2000 IU daily), Alpha-Lipoic Acid (600-1800mg daily), Acetyl-L-Carnitine (500-1000mg TID, L-methyl folate  (1500mcg daily)    ** Metformin can block Vitamin B6 and B12 so it is important to supplement**    FOOT CARE RECOMMENDATIONS   1. Wash your feet with lukewarm water and a mild soap and then dry them thoroughly, especially between the toes.     2. Examine your feet daily looking for cuts, corns, blisters, cracks, ect, especially after wearing new shoes. Make sure to look between your toes. If you cannot see the bottom of your feet, set a mirror on the floor and hold your foot over it, or ask a spouse, friend or family member to examine your feet for you. Contact your doctor immediately if new problems are noted or if sores are not healing.     3. Immediately apply moisturizer to the tops and bottoms of your feet, avoiding areas between the toes. Hand lotion (Intesive Care, Jessica, Eucerin, Neutrogena, Curel, ect) is sufficient unless your doctor prescribes a medicated lotion. Apply sunscreen to your feet when going swimming outside.     4. Use clean comfortable shoes, wear white socks (if you have any bleeding or drainage, you will see it on white socks). Socks should not have thick seams or cut off the circulation around the leg. Break in new shoes slowly and rotate with older shoes until broken in. Check the inside of your shoes with your hand to look for areas of irritation or objects that may have fallen into your shoes.       5. Keep slippers by the side of your bed for use during the night.     6.  Shoes should be fitted by a professional and should not cause areas of irritation.  Check your feet regularly when wearing a new pair of shoes and replace them as needed.     7.  Talk to your doctor about proper exercise. Exercise and stretching stimulate blood flow to your feet and maintain proper glucose levels.     8.  Monitor your blood glucose level as instructed by your doctor. Notify your doctor immediately if your blood sugar is abnormally high or low.    9. Cut your nails straight across, but then gently  round any sharp edges with a cardboard nail file. If you have neuropathy, peripheral vascular disease or cannot see that well to trim your own toenails contact [facility] ([phone]) or [facility] ([phone]).      THINGS TO AVOID DOING   1.  Do not soak your feet if you have an open sore. Use only lukewarm water and always check the temperature with your hand as hot water can easily burn your feet.       2.  Never use a hot water bottle or heating pad on your feet. Also do not apply cold compresses to your feet. With decreased sensation, you could burn or freeze your feet.       3.  Do not apply any of these to your feet:    -  Over the counter medicine for corns or warts    -  Harsh chemicals like boric acid    -  Do not self-treat corns, cuts, blisters or infections. Always consult your doctor.       4.  Do not wear sandals, slippers or walk barefoot, especially on hot sand or concrete or other harsh surfaces.     5.  If you smoke, stop!!!        NAIL FUNGUS / ONYCHOMYCOSIS   Nail fungus is not a hygiene problem and will not likely lead to significant medical   problems. The nails may get thick causing pain and possibly local skin infection.   Treatments include debridement (trimming), oral antifungals, topical antifungals and complete removal of the nail. Most fungal nails are not treated.   Topicals such as tea tree oil can be helpful for surface fungus and may, at best, limit   progression. Over the counter creams (such as Lamisil) can also be used however, their effectiveness is also quite low.  Topical treatment with Pen lac is expensive and often not covered by insurance. Pen lac has an approximate 8% success rate. Topical therapy recommendations is to apply twice a day for at least 3-4 months as it takes 9 months for new nail to grow out.    Experts suggest soaking your feet for 15 to 20 minutes in a mixture of 1 cup vinegar to 4 cups warm water. Be sure to rinse well and pat your feet dry when  you're done. You can soak your feet like this daily. But if your skin becomes irritated, try soaking only two to three times a week. Vicks VapoRub, as with vinegar, there have been no controlled clinical trials to assess the effectiveness of Vicks VapoRub on nail fungus, but there have been numerous anecdotal reports that it works. There's no consensus on how often to apply this product, so check with your doctor before using it on your nails.     Oral therapies include Sporanox and Lamisil. Oral therapies are also expensive and not very effective. Side effects such as liver disease are the main concern. Return of fungus is common even if the treatment worked.     Other Tips:  - Penlac nail medication apply daily x 4 months; remove old polish first day of each week  - Antifungal cream/powder (Zeasorb)   apply daily to feet and shoes x 2 months  - Clean shoes with Lysol or in washing machine every few weeks  - Rotate shoe gear; give them 24 hours to dry out between days wearing them  - Clean pair of socks in morning, clean pair in afternoon if your feet sweat  - Shower shoes used in public showers/pools      Body Mass Index (BMI)  Many things can cause foot and ankle problems. Foot structure, activity level, foot mechanics and injuries are common causes of pain.  One very important issue that often goes unmentioned, is body weight. Extra weight can cause increased stress on muscles, ligaments, bones and tendons.  Sometimes just a few extra pounds is all it takes to put one over her/his threshold. Without reducing that stress, it can be difficult to alleviate pain. Some people are uncomfortable addressing this issue, but we feel it is important for you to think about it. As Foot &  Ankle specialists, our job is addressing the lower extremity problem and possible causes. Regarding extra body weight, we encourage patients to discuss diet and weight management plans with their primary care doctors. It is this team  approach that gives you the best opportunity for pain relief and getting you back on your feet.

## 2018-03-20 NOTE — PROGRESS NOTES
"PATIENT HISTORY:  Dr. kessler requested I see this patient for their foot issue.  Susan Dietrich is a 57 year old female who presents to clinic for nail fungus to the right great toenail. Denies pain. States it is \"disintigrating\" at the base. Has been using cyclopirox. Is wondering what can be done to get rid of it. Is diabetic.     Review of Systems:  Patient denies fever, chills, rash, wound, stiffness, limping, numbness, weakness, heart burn, blood in stool, chest pain with activity, calf pain when walking, shortness of breath with activity, chronic cough, easy bleeding/bruising, swelling of ankles, excessive thirst, fatigue, depression, anxiety.  Patient admits to numbness.     PAST MEDICAL HISTORY:   Past Medical History:   Diagnosis Date     Arthritis      Congestive heart failure, unspecified     Mother     Depressive disorder      Essential hypertension, benign 10/24/2006     Hypermobility syndrome     multiple joint pains     Irritable bowel syndrome     referred to GI 9/05;  nl colonoscopy 12/05     Migraine, unspecified, without mention of intractable migraine without mention of status migrainosus     Migraine     NONSPECIFIC MEDICAL HISTORY 12/05    nl colonoscopy      Other and unspecified malignant neoplasm of skin of other and unspecified parts of face 4/08    squamous cell skin CA, face     PVD (peripheral vascular disease) (H)      Type II or unspecified type diabetes mellitus without mention of complication, not stated as uncontrolled         PAST SURGICAL HISTORY:   Past Surgical History:   Procedure Laterality Date     ABDOMEN SURGERY       ABDOMEN SURGERY  1981    Appendectomy     APPENDECTOMY       BIOPSY       BREAST SURGERY       C NONSPECIFIC PROCEDURE      s/p appendectomy     C NONSPECIFIC PROCEDURE  1992    s/p lumpectomy L breast- benign     C NONSPECIFIC PROCEDURE  1994    cholecystectomy     C NONSPECIFIC PROCEDURE  1995    s/p hernia through zen scar     C NONSPECIFIC PROCEDURE  " 2002    benign breast lumps     CHOLECYSTECTOMY       COLONOSCOPY       HERNIA REPAIR          MEDICATIONS:   Current Outpatient Prescriptions:      buPROPion (WELLBUTRIN XL) 150 MG 24 hr tablet, Take 1 tablet (150 mg) by mouth every morning, Disp: 90 tablet, Rfl: 1     glipiZIDE (GLUCOTROL XL) 10 MG 24 hr tablet, Take 1 tablet (10 mg) by mouth daily, Disp: 90 tablet, Rfl: 1     metFORMIN (GLUCOPHAGE) 500 MG tablet, Take 2 tablets (1,000 mg) by mouth 2 times daily (with meals), Disp: 360 tablet, Rfl: 1     topiramate (TOPAMAX) 50 MG tablet, Take 1 tablet (50 mg) by mouth 2 times daily, Disp: 180 tablet, Rfl: 1     atorvastatin (LIPITOR) 10 MG tablet, Take 1 tablet (10 mg) by mouth daily, Disp: 90 tablet, Rfl: 3     lisinopril (PRINIVIL/ZESTRIL) 5 MG tablet, Take 1 tablet (5 mg) by mouth daily, Disp: 90 tablet, Rfl: 1     lisinopril (PRINIVIL/ZESTRIL) 10 MG tablet, Take 1 tablet (10 mg) by mouth daily, Disp: 90 tablet, Rfl: 1     cholestyramine light (PREVALITE) 4 GM powder, 1 LEVEL SCOOPFUL DAILY IN LIQUID, Disp: 231 g, Rfl: 4     ciclopirox 8 % SOLN, Externally apply topically daily, Disp: 3 Bottle, Rfl: 1     fluticasone (FLONASE) 50 MCG/ACT nasal spray, Spray 1-2 sprays into both nostrils daily, Disp: 3 g, Rfl: 3     blood glucose monitoring (TRUETEST) test strip, by In Vitro route 3 times daily., Disp: 3 Box, Rfl: 3     TRUEPLUS LANCETS 33G MISC, 1 strip 3 times daily TRUETEST LANCETS to be used with the Trutest test strips., Disp: 3 Month, Rfl: 3     aspirin 325 MG EC tablet, Take 1 tablet (325 mg) by mouth daily, Disp: 100 tablet, Rfl: 3     Glucose Blood (ONE TOUCH ULTRA TEST) strip, by In Vitro route 3 times daily. Test as directed 3 times daily, Disp: 300 strip, Rfl: 12     ORDER FOR DME, Orthotic shoes, pt with diabetes and peripheral neuropathy, Disp: 1 each, Rfl: 1     Cholecalciferol 5000 UNIT CAPS, Take 1 capsule by mouth daily., Disp: 90 capsule, Rfl: 3     ALLERGIES:    Allergies   Allergen Reactions  "    No Known Allergies         SOCIAL HISTORY:   Social History     Social History     Marital status:      Spouse name: Landry     Number of children: 0     Years of education: N/A     Occupational History           [employer]     Social History Main Topics     Smoking status: Former Smoker     Packs/day: 1.00     Years: 5.00     Types: Cigarettes     Quit date: 1/4/1985     Smokeless tobacco: Never Used      Comment: quit in 1985 smoked one pack a day     Alcohol use Yes      Comment: Minimal     Drug use: No     Sexual activity: Yes     Partners: Male     Birth control/ protection: Surgical      Comment: No sex drive     Other Topics Concern      Service No     Blood Transfusions No     Caffeine Concern No     quit     Stress Concern Yes     increased work stress     Special Diet No     calcium daily, dairy per day 4     Exercise Yes     4 per week, cardio and weights     Seat Belt Yes     98%     Self-Exams No     Parent/Sibling W/ Cabg, Mi Or Angioplasty Before 65f 55m? Yes     Social History Narrative        FAMILY HISTORY:   Family History   Problem Relation Age of Onset     Hypertension Mother      DIABETES Mother      Arthritis Mother      HEART DISEASE Mother      chf     Dementia Mother      Cardiovascular Father      cerebral hemorrhage     DIABETES Brother      Obesity Brother      Cancer - colorectal Maternal Aunt      niece also     Depression/Anxiety Sister      Depression Sister      Mental Illness Sister      MENTAL ILLNESS Sister      Obesity Sister      DIABETES Sister      DIABETES Brother      passed away MI     Obesity Sister         EXAM:Vitals: /76  Ht 5' 5.5\" (1.664 m)  Wt 206 lb (93.4 kg)  LMP 10/03/2010  BMI 33.76 kg/m2  BMI= Body mass index is 33.76 kg/(m^2).     A1C: 7.8 (3/2018)    General appearance: Patient is alert and fully cooperative with history & exam.  No sign of distress is noted during the visit.   "   Psychiatric: Affect is pleasant & appropriate.  Patient appears motivated to improve health.     Respiratory: Breathing is regular & unlabored while sitting.     HEENT: Hearing is intact to spoken word.  Speech is clear.  No gross evidence of visual impairment that would impact ambulation.     Dermatologic:right great toenail is yellowed, subungual debri. No redness or signs of acute infection.      Vascular: DP & PT pulses are intact & regular bilaterally.  No significant edema or varicosities noted.  CFT and skin temperature is normal to both lower extremities.     Neurologic: Lower extremity sensation is diminshed.      Musculoskeletal: Patient is ambulatory without assistive device or brace.  No gross ankle deformity noted.  No foot or ankle joint effusion is noted.       ASSESSMENT:    Type 2 diabetes, uncontrolled, with neuropathy (H)  Onychomycosis of right great toe     PLAN:  Reviewed patient's chart in [facility]. Talked about diabetic foot care. Discussed causes and treatments of nail fungus.  Explained that even if a culture comes back negative, a patient could still have nail fungus.  Discussed treatment options with patient and explained that there isn't one treatment that is 100% effective.  Discussed oral lamisil which is the most effective at about 70% but which can have liver effects.  Explained that if she wanted to try this that she would need serial blood draws to test her liver function.  Discussed over the counter antifungal creams.  Explained that these are about 50% effective and need to be applied twice a day for about 3-4 months.  Also talked about prescription penlac which is a nail laquer.  Again this is also only 50% effective.  Also discussed that if there was damage to the nail and the nail is now dystrophic that non of the above is going to change the nail.  If there was damage, there is note anything that can be done for the nail to correct it.  Discussed that if it becomes painful, we  can remove the nail in clinic.        At this time, recommend topical antifungal cream. Discussed nail removal but there is no guarantee that the nail will grow back normal. She is not having pain so will hold off on nail removal at this time. Recommend nail file to thin down ridges to nail.          Nannette Sanchez DPM, Podiatry/Foot and Ankle Surgery    Weight management plan: Patient was referred to their PCP to discuss a diet and exercise plan.

## 2018-03-20 NOTE — NURSING NOTE
"Chief Complaint   Patient presents with     Toenail     right great toenail was removed [id number] has been using a nail polish rx for nail and since has been turning yellow        Initial /76  Ht 5' 5.5\" (1.664 m)  Wt 206 lb (93.4 kg)  LMP 10/03/2010  BMI 33.76 kg/m2 Estimated body mass index is 33.76 kg/(m^2) as calculated from the following:    Height as of this encounter: 5' 5.5\" (1.664 m).    Weight as of this encounter: 206 lb (93.4 kg).  Medication Reconciliation: complete   Ignacio Palacio MA      "

## 2018-03-20 NOTE — LETTER
"    3/20/2018         RE: Susan Dietrich  [address] [place] [id number]        Dear Colleague,    Thank you for referring your patient, Susan Dietrich, to the [facility]. Please see a copy of my visit note below.    PATIENT HISTORY:  Dr. kessler requested I see this patient for their foot issue.  Susan Dietrich is a 57 year old female who presents to clinic for nail fungus to the right great toenail. Denies pain. States it is \"disintigrating\" at the base. Has been using cyclopirox. Is wondering what can be done to get rid of it. Is diabetic.     Review of Systems:  Patient denies fever, chills, rash, wound, stiffness, limping, numbness, weakness, heart burn, blood in stool, chest pain with activity, calf pain when walking, shortness of breath with activity, chronic cough, easy bleeding/bruising, swelling of ankles, excessive thirst, fatigue, depression, anxiety.  Patient admits to numbness.     PAST MEDICAL HISTORY:   Past Medical History:   Diagnosis Date     Arthritis      Congestive heart failure, unspecified     Mother     Depressive disorder      Essential hypertension, benign 10/24/2006     Hypermobility syndrome     multiple joint pains     Irritable bowel syndrome     referred to GI 9/05;  nl colonoscopy 12/05     Migraine, unspecified, without mention of intractable migraine without mention of status migrainosus     Migraine     NONSPECIFIC MEDICAL HISTORY 12/05    nl colonoscopy      Other and unspecified malignant neoplasm of skin of other and unspecified parts of face 4/08    squamous cell skin CA, face     PVD (peripheral vascular disease) (H)      Type II or unspecified type diabetes mellitus without mention of complication, not stated as uncontrolled         PAST SURGICAL HISTORY:   Past Surgical History:   Procedure Laterality Date     ABDOMEN SURGERY       ABDOMEN SURGERY  1981    Appendectomy     APPENDECTOMY       BIOPSY       BREAST SURGERY       C NONSPECIFIC " PROCEDURE      s/p appendectomy     C NONSPECIFIC PROCEDURE  1992    s/p lumpectomy L breast- benign     C NONSPECIFIC PROCEDURE  1994    cholecystectomy     C NONSPECIFIC PROCEDURE  1995    s/p hernia through zen scar     C NONSPECIFIC PROCEDURE  2002    benign breast lumps     CHOLECYSTECTOMY       COLONOSCOPY       HERNIA REPAIR          MEDICATIONS:   Current Outpatient Prescriptions:      buPROPion (WELLBUTRIN XL) 150 MG 24 hr tablet, Take 1 tablet (150 mg) by mouth every morning, Disp: 90 tablet, Rfl: 1     glipiZIDE (GLUCOTROL XL) 10 MG 24 hr tablet, Take 1 tablet (10 mg) by mouth daily, Disp: 90 tablet, Rfl: 1     metFORMIN (GLUCOPHAGE) 500 MG tablet, Take 2 tablets (1,000 mg) by mouth 2 times daily (with meals), Disp: 360 tablet, Rfl: 1     topiramate (TOPAMAX) 50 MG tablet, Take 1 tablet (50 mg) by mouth 2 times daily, Disp: 180 tablet, Rfl: 1     atorvastatin (LIPITOR) 10 MG tablet, Take 1 tablet (10 mg) by mouth daily, Disp: 90 tablet, Rfl: 3     lisinopril (PRINIVIL/ZESTRIL) 5 MG tablet, Take 1 tablet (5 mg) by mouth daily, Disp: 90 tablet, Rfl: 1     lisinopril (PRINIVIL/ZESTRIL) 10 MG tablet, Take 1 tablet (10 mg) by mouth daily, Disp: 90 tablet, Rfl: 1     cholestyramine light (PREVALITE) 4 GM powder, 1 LEVEL SCOOPFUL DAILY IN LIQUID, Disp: 231 g, Rfl: 4     ciclopirox 8 % SOLN, Externally apply topically daily, Disp: 3 Bottle, Rfl: 1     fluticasone (FLONASE) 50 MCG/ACT nasal spray, Spray 1-2 sprays into both nostrils daily, Disp: 3 g, Rfl: 3     blood glucose monitoring (TRUETEST) test strip, by In Vitro route 3 times daily., Disp: 3 Box, Rfl: 3     TRUEPLUS LANCETS 33G MISC, 1 strip 3 times daily TRUETEST LANCETS to be used with the Trutest test strips., Disp: 3 Month, Rfl: 3     aspirin 325 MG EC tablet, Take 1 tablet (325 mg) by mouth daily, Disp: 100 tablet, Rfl: 3     Glucose Blood (ONE TOUCH ULTRA TEST) strip, by In Vitro route 3 times daily. Test as directed 3 times daily, Disp: 300 strip,  "Rfl: 12     ORDER FOR DME, Orthotic shoes, pt with diabetes and peripheral neuropathy, Disp: 1 each, Rfl: 1     Cholecalciferol 5000 UNIT CAPS, Take 1 capsule by mouth daily., Disp: 90 capsule, Rfl: 3     ALLERGIES:    Allergies   Allergen Reactions     No Known Allergies         SOCIAL HISTORY:   Social History     Social History     Marital status:      Spouse name: Landry     Number of children: 0     Years of education: N/A     Occupational History           [employer]     Social History Main Topics     Smoking status: Former Smoker     Packs/day: 1.00     Years: 5.00     Types: Cigarettes     Quit date: 1/4/1985     Smokeless tobacco: Never Used      Comment: quit in 1985 smoked one pack a day     Alcohol use Yes      Comment: Minimal     Drug use: No     Sexual activity: Yes     Partners: Male     Birth control/ protection: Surgical      Comment: No sex drive     Other Topics Concern      Service No     Blood Transfusions No     Caffeine Concern No     quit     Stress Concern Yes     increased work stress     Special Diet No     calcium daily, dairy per day 4     Exercise Yes     4 per week, cardio and weights     Seat Belt Yes     98%     Self-Exams No     Parent/Sibling W/ Cabg, Mi Or Angioplasty Before 65f 55m? Yes     Social History Narrative        FAMILY HISTORY:   Family History   Problem Relation Age of Onset     Hypertension Mother      DIABETES Mother      Arthritis Mother      HEART DISEASE Mother      chf     Dementia Mother      Cardiovascular Father      cerebral hemorrhage     DIABETES Brother      Obesity Brother      Cancer - colorectal Maternal Aunt      niece also     Depression/Anxiety Sister      Depression Sister      Mental Illness Sister      MENTAL ILLNESS Sister      Obesity Sister      DIABETES Sister      DIABETES Brother      passed away MI     Obesity Sister         EXAM:Vitals: /76  Ht 5' 5.5\" (1.664 m)  Wt 206 lb " (93.4 kg)  [facility] 10/03/2010  BMI 33.76 kg/m2  BMI= Body mass index is 33.76 kg/(m^2).     A1C: 7.8 (3/2018)    General appearance: Patient is alert and fully cooperative with history & exam.  No sign of distress is noted during the visit.     Psychiatric: Affect is pleasant & appropriate.  Patient appears motivated to improve health.     Respiratory: Breathing is regular & unlabored while sitting.     HEENT: Hearing is intact to spoken word.  Speech is clear.  No gross evidence of visual impairment that would impact ambulation.     Dermatologic:right great toenail is yellowed, subungual debri. No redness or signs of acute infection.      Vascular: DP & PT pulses are intact & regular bilaterally.  No significant edema or varicosities noted.  CFT and skin temperature is normal to both lower extremities.     Neurologic: Lower extremity sensation is diminshed.      Musculoskeletal: Patient is ambulatory without assistive device or brace.  No gross ankle deformity noted.  No foot or ankle joint effusion is noted.       ASSESSMENT:    Type 2 diabetes, uncontrolled, with neuropathy (H)  Onychomycosis of right great toe     PLAN:  Reviewed patient[facility] in [facility]. Talked about diabetic foot care. Discussed causes and treatments of nail fungus.  Explained that even if a culture comes back negative, a patient could still have nail fungus.  Discussed treatment options with patient and explained that there isn't one treatment that is 100% effective.  Discussed oral lamisil which is the most effective at about 70% but which can have liver effects.  Explained that if she wanted to try this that she would need serial blood draws to test her liver function.  Discussed over the counter antifungal creams.  Explained that these are about 50% effective and need to be applied twice a day for about 3-4 months.  Also talked about prescription penlac which is a nail laquer.  Again this is also only 50% effective.  Also discussed that if  there was damage to the nail and the nail is now dystrophic that non of the above is going to change the nail.  If there was damage, there is note anything that can be done for the nail to correct it.  Discussed that if it becomes painful, we can remove the nail in clinic.        At this time, recommend topical antifungal cream. Discussed nail removal but there is no guarantee that the nail will grow back normal. She is not having pain so will hold off on nail removal at this time. Recommend nail file to thin down ridges to nail.          Nannette Sanchez DPM, Podiatry/Foot and Ankle Surgery    Weight management plan: Patient was referred to their PCP to discuss a diet and exercise plan.      Again, thank you for allowing me to participate in the care of your patient.        Sincerely,        Nannette Sanchez DPM, Podiatry/Foot and Ankle Surgery

## 2018-03-20 NOTE — MR AVS SNAPSHOT
"              After Visit Summary   3/20/2018    Susan Dietrich    MRN: [id number]           Patient Information     Date Of Birth          1960        Visit Information        Provider Department      3/20/2018 3:15 PM Nannette Sanchez DPM, Podiatry/Foot and Ankle Surgery [facility]        Today's Diagnoses     Type 2 diabetes, uncontrolled, with neuropathy (H)    -  1    Onychomycosis of right great toe          Care Instructions    Thank you for choosing [facility]!    DR. SANCHEZ'S CLINIC LOCATIONS:   MONDAY AM - [place] TUESDAY - [place]   [address] [place] [address] [place] [address]   [phone] / [phone] / [phone]       WEDNESDAY - [place] FRIDAY PM - [place]   [address]   [place] [address] [place] [address]   [phone] / [phone] / [phone]       SCHEDULE SURGERY: [phone]    APPOINTMENTS: [phone]    BILLING QUESTIONS: [phone]      DIABETES AND YOUR FEET  Diabetes can result in several problems in the feet including ulcers (open sores) and amputations. Two of the most important reasons why people develop foot problems when they have diabetes is : 1. Neuropathy (loss of feeling)  2. Vascular disease (loss or decrease of blood flow).    Neuropathy is a term used to describe a loss of nerve function.  Patients with diabetes are at risk of developing neuropathy if their sugars continue to run high and are above the normal value. One theory for neuropathy is that the \"extra\" sugar in the body enters the nerves and is broken down. These by-products build up in the nerve causing it to swell and impairing nerve function. Often times, this can be prevented by controlling your sugars, dieting and exercise.    When a person develops neuropathy, they usually begin to feel numbness or tingling in their feet and " sometime in their legs.  Other symptoms may include painful burning or hot feet, tingling or feeling like insects or ants are crawling on your feet or legs.  If the diabetes is sever and the sugars run high for long periods of time, neuropathy can also occur in the hands.    Vascular disease  is a term used to describe a loss or decrease in circulation (blood flow). There is a problem in getting blood and oxygen to areas that need it. Similar to neuropathy, sugars can build up in the walls of the arteries (blood vessels) and cause them to become swollen, thickened and hardened. This decreases the amount of blood that can go to an area that needs it. Though this is common in the legs of diabetic patients, it can also affect other arteries (blood vessels) in the body such as in the heart and eyes.    In the legs, vascular disease usually results in cramping. Patients who develop leg cramps after walking the same distance every time (i.e. One block, half a mile, ect.) need to let their doctors know so that their circulation may be checked. Cramps causing severe pain in the feet and/or legs while sleeping and the cramps go away when you stand or hang your legs off the side of the bed, may also be a sign of poor blood circulation.  Occasional cramping in cold weather or on rare occasions with activity may not be due to poor circulation, but you should inform your doctor.    PREVENTION OF THESE DISEASES  The key to prevention is good blood sugar control. Poor blood sugar control is a big reason many of these problems start. Physical activity (exercise) is a very good way to help decrease your blood sugars. Exercise can lower your blood sugar, blood pressure, and cholesterol. It also reduces your risk for heart disease and stroke, relieves stress, and strengthens your heart, muscles and bones.  In addition, regular activity helps insulin work better, improves your blood circulation, and keeps your joints flexible. If  you're trying to lose weight, a combination of exercise and wise food choices can help you reach your target weight and maintain it.      PAIN MANAGEMENT  1.Blood Sugar Control - Most important  2. Medications such as:  Amytriptylline, duloxetine, gabapentin, lyrica, tramadol  3. Nutritional therapy:  Vitamin B6 (100mg daily), Vitamin B12 (75mcg daily), Vitamin D 2000 IU daily), Alpha-Lipoic Acid (600-1800mg daily), Acetyl-L-Carnitine (500-1000mg TID, L-methyl folate (1500mcg daily)    ** Metformin can block Vitamin B6 and B12 so it is important to supplement**    FOOT CARE RECOMMENDATIONS   1. Wash your feet with lukewarm water and a mild soap and then dry them thoroughly, especially between the toes.     2. Examine your feet daily looking for cuts, corns, blisters, cracks, ect, especially after wearing new shoes. Make sure to look between your toes. If you cannot see the bottom of your feet, set a mirror on the floor and hold your foot over it, or ask a spouse, friend or family member to examine your feet for you. Contact your doctor immediately if new problems are noted or if sores are not healing.     3. Immediately apply moisturizer to the tops and bottoms of your feet, avoiding areas between the toes. Hand lotion (Intesive Care, Jessica, Eucerin, Neutrogena, Curel, ect) is sufficient unless your doctor prescribes a medicated lotion. Apply sunscreen to your feet when going swimming outside.     4. Use clean comfortable shoes, wear white socks (if you have any bleeding or drainage, you will see it on white socks). Socks should not have thick seams or cut off the circulation around the leg. Break in new shoes slowly and rotate with older shoes until broken in. Check the inside of your shoes with your hand to look for areas of irritation or objects that may have fallen into your shoes.       5. Keep slippers by the side of your bed for use during the night.     6.  Shoes should be fitted by a professional and should  not cause areas of irritation.  Check your feet regularly when wearing a new pair of shoes and replace them as needed.     7.  Talk to your doctor about proper exercise. Exercise and stretching stimulate blood flow to your feet and maintain proper glucose levels.     8.  Monitor your blood glucose level as instructed by your doctor. Notify your doctor immediately if your blood sugar is abnormally high or low.    9. Cut your nails straight across, but then gently round any sharp edges with a cardboard nail file. If you have neuropathy, peripheral vascular disease or cannot see that well to trim your own toenails contact [facility] ([phone]) or [facility] ([phone]).      THINGS TO AVOID DOING   1.  Do not soak your feet if you have an open sore. Use only lukewarm water and always check the temperature with your hand as hot water can easily burn your feet.       2.  Never use a hot water bottle or heating pad on your feet. Also do not apply cold compresses to your feet. With decreased sensation, you could burn or freeze your feet.       3.  Do not apply any of these to your feet:    -  Over the counter medicine for corns or warts    -  Harsh chemicals like boric acid    -  Do not self-treat corns, cuts, blisters or infections. Always consult your doctor.       4.  Do not wear sandals, slippers or walk barefoot, especially on hot sand or concrete or other harsh surfaces.     5.  If you smoke, stop!!!        NAIL FUNGUS / ONYCHOMYCOSIS   Nail fungus is not a hygiene problem and will not likely lead to significant medical   problems. The nails may get thick causing pain and possibly local skin infection.   Treatments include debridement (trimming), oral antifungals, topical antifungals and complete removal of the nail. Most fungal nails are not treated.   Topicals such as tea tree oil can be helpful for surface fungus and may, at best, limit   progression. Over the counter creams (such as Lamisil) can also be  used however, their effectiveness is also quite low.  Topical treatment with Pen lac is expensive and often not covered by insurance. Pen lac has an approximate 8% success rate. Topical therapy recommendations is to apply twice a day for at least 3-4 months as it takes 9 months for new nail to grow out.    Experts suggest soaking your feet for 15 to 20 minutes in a mixture of 1 cup vinegar to 4 cups warm water. Be sure to rinse well and pat your feet dry when you're done. You can soak your feet like this daily. But if your skin becomes irritated, try soaking only two to three times a week. Vicks VapoRub, as with vinegar, there have been no controlled clinical trials to assess the effectiveness of Vicks VapoRub on nail fungus, but there have been numerous anecdotal reports that it works. There's no consensus on how often to apply this product, so check with your doctor before using it on your nails.     Oral therapies include Sporanox and Lamisil. Oral therapies are also expensive and not very effective. Side effects such as liver disease are the main concern. Return of fungus is common even if the treatment worked.     Other Tips:  - Penlac nail medication apply daily x 4 months; remove old polish first day of each week  - Antifungal cream/powder (Zeasorb) - apply daily to feet and shoes x 2 months  - Clean shoes with Lysol or in washing machine every few weeks  - Rotate shoe gear; give them 24 hours to dry out between days wearing them  - Clean pair of socks in morning, clean pair in afternoon if your feet sweat  - Shower shoes used in public showers/pools      Body Mass Index (BMI)  Many things can cause foot and ankle problems. Foot structure, activity level, foot mechanics and injuries are common causes of pain.  One very important issue that often goes unmentioned, is body weight. Extra weight can cause increased stress on muscles, ligaments, bones and tendons.  Sometimes just a few extra pounds is all it takes  to put one over her/his threshold. Without reducing that stress, it can be difficult to alleviate pain. Some people are uncomfortable addressing this issue, but we feel it is important for you to think about it. As Foot &  Ankle specialists, our job is addressing the lower extremity problem and possible causes. Regarding extra body weight, we encourage patients to discuss diet and weight management plans with their primary care doctors. It is this team approach that gives you the best opportunity for pain relief and getting you back on your feet.                  Follow-ups after your visit        Who to contact     If you have questions or need follow up information about today's clinic visit or your schedule please contact [facility] directly at [phone].  Normal or non-critical lab and imaging results will be communicated to you by [facility], letter or phone within 4 business days after the clinic has received the results. If you do not hear from us within 7 days, please contact the clinic through [facility] or phone. If you have a critical or abnormal lab result, we will notify you by phone as soon as possible.  Submit refill requests through [employer] or call your pharmacy and they will forward the refill request to us. Please allow 3 business days for your refill to be completed.          Additional Information About Your Visit        [employer] Information     [employer] gives you secure access to your electronic health record. If you see a primary care provider, you can also send messages to your care team and make appointments. If you have questions, please call your primary care clinic.  If you do not have a primary care provider, please call [phone] and they will assist you.        Care EveryWhere ID     This is your Care EveryWhere ID. This could be used by other organizations to access your [place] medical records  [id number]        Your Vitals Were     Height Last Period BMI (Body  "Mass Index)             5' 5.5\" (1.664 m) 10/03/2010 33.76 kg/m2          Blood Pressure from Last 3 Encounters:   03/20/18 108/76   03/09/18 96/74   12/15/17 112/80    Weight from Last 3 Encounters:   03/20/18 206 lb (93.4 kg)   03/09/18 206 lb (93.4 kg)   09/12/17 202 lb (91.6 kg)              Today, you had the following     No orders found for display         Today's Medication Changes          These changes are accurate as of 3/20/18  3:48 PM.  If you have any questions, ask your nurse or doctor.               Start taking these medicines.        Dose/Directions    tolnaftate 1 % cream   Commonly known as:  TOLNAFTATE ANTIFUNGAL   Used for:  Type 2 diabetes, uncontrolled, with neuropathy (H), Onychomycosis of right great toe   Started by:  Nannette Sanchez, GARY, Podiatry/Foot and Ankle Surgery        Apply topically daily Apply to affect nail daily   Quantity:  30 g   Refills:  3            Where to get your medicines      These medications were sent to [facility] [address] [address]     Phone:  [phone]     tolnaftate 1 % cream                Primary Care Provider Office Phone # Fax #    Concha Kristin Lucas [phone] [id number]   [facility] [address]        Equal Access to Services     [facility] AH: Hadii kiel byerso Sopola, waaxda luqadaha, qaybta kaalmada adelorraineyada, patti heredia. So [facility] [phone].    ATENCIÓN: Si habla español, tiene a langley disposición servicios gratuitos de asistencia lingüística. Llame al [phone].    We comply with applicable federal civil rights laws and [place] laws. We do not discriminate on the basis of race, color, national origin, age, disability, sex, sexual orientation, or gender identity.            Thank you!     Thank you for choosing [facility]  for your care. Our goal is always to provide you with excellent " care. Hearing back from our patients is one way we can continue to improve our services. Please take a few minutes to complete the written survey that you may receive in the mail after your visit with us. Thank you!             Your Updated Medication List - Protect others around you: Learn how to safely use, store and throw away your medicines at www.disposemymeds.org.          This list is accurate as of 3/20/18  3:48 PM.  Always use your most recent med list.                   Brand Name Dispense Instructions for use Diagnosis    aspirin 325 MG EC tablet     100 tablet    Take 1 tablet (325 mg) by mouth daily    Routine general medical examination at a health care facility       atorvastatin 10 MG tablet    LIPITOR    90 tablet    Take 1 tablet (10 mg) by mouth daily    Hyperlipidemia LDL goal <100       buPROPion 150 MG 24 hr tablet    WELLBUTRIN XL    90 tablet    Take 1 tablet (150 mg) by mouth every morning    Major depressive disorder, recurrent episode, moderate (H)       cholecalciferol 5000 UNITS Caps     90 capsule    Take 1 capsule by mouth daily.    Vitamin D deficiency       cholestyramine light 4 GM powder    PREVALITE    231 g    1 LEVEL SCOOPFUL DAILY IN LIQUID    Hyperlipidemia LDL goal <100       ciclopirox 8 % Soln     3 Bottle    Externally apply topically daily    Onychomycosis       fluticasone 50 MCG/ACT spray    FLONASE    3 g    Spray 1-2 sprays into both nostrils daily    Chronic maxillary sinusitis       glipiZIDE 10 MG 24 hr tablet    GLUCOTROL XL    90 tablet    Take 1 tablet (10 mg) by mouth daily    Type 2 diabetes, uncontrolled, with neuropathy (H)       * lisinopril 10 MG tablet    PRINIVIL/ZESTRIL    90 tablet    Take 1 tablet (10 mg) by mouth daily    Type 2 diabetes, uncontrolled, with neuropathy (H)       * lisinopril 5 MG tablet    PRINIVIL/ZESTRIL    90 tablet    Take 1 tablet (5 mg) by mouth daily    Type 2 diabetes, uncontrolled, with neuropathy (H)       metFORMIN 500 MG  tablet    GLUCOPHAGE    360 tablet    Take 2 tablets (1,000 mg) by mouth 2 times daily (with meals)    Type 2 diabetes, uncontrolled, with neuropathy (H)       * ONETOUCH ULTRA test strip   Generic drug:  blood glucose monitoring     300 strip    by In Vitro route 3 times daily. Test as directed 3 times daily    Type II or unspecified type diabetes mellitus without mention of complication, not stated as uncontrolled       * blood glucose monitoring test strip    TRUETEST    3 Box    by In Vitro route 3 times daily.    Type 2 diabetes, uncontrolled, with neuropathy (H)       order for DME     1 each    Orthotic shoes, pt with diabetes and peripheral neuropathy    Type 2 diabetes, HbA1C goal < 8% (H)       tolnaftate 1 % cream    TOLNAFTATE ANTIFUNGAL    30 g    Apply topically daily Apply to affect nail daily    Type 2 diabetes, uncontrolled, with neuropathy (H), Onychomycosis of right great toe       topiramate 50 MG tablet    TOPAMAX    180 tablet    Take 1 tablet (50 mg) by mouth 2 times daily    Morbid obesity due to excess calories (H)       TRUEPLUS LANCETS 33G Misc     3 Month    1 strip 3 times daily TRUETEST LANCETS to be used with the Trutest test strips.    Type 2 diabetes, uncontrolled, with neuropathy (H)       * Notice:  This list has 4 medication(s) that are the same as other medications prescribed for you. Read the directions carefully, and ask your doctor or other care provider to review them with you.

## 2018-03-21 ENCOUNTER — MYC MEDICAL ADVICE (OUTPATIENT)
Dept: FAMILY MEDICINE | Facility: CLINIC | Age: 58
End: 2018-03-21

## 2018-03-21 DIAGNOSIS — B35.1 FUNGAL TOENAIL INFECTION: Primary | ICD-10-CM

## 2018-03-22 RX ORDER — TERBINAFINE HYDROCHLORIDE 250 MG/1
250 TABLET ORAL DAILY
Qty: 90 TABLET | Refills: 0 | Status: SHIPPED | OUTPATIENT
Start: 2018-03-22 | End: 2018-07-17

## 2018-04-17 ENCOUNTER — TRANSFERRED RECORDS (OUTPATIENT)
Dept: HEALTH INFORMATION MANAGEMENT | Facility: CLINIC | Age: 58
End: 2018-04-17

## 2018-05-09 DIAGNOSIS — B35.1 ONYCHOMYCOSIS: ICD-10-CM

## 2018-05-10 RX ORDER — CICLOPIROX 80 MG/ML
SOLUTION TOPICAL
Qty: 19.8 ML | Refills: 1 | Status: SHIPPED | OUTPATIENT
Start: 2018-05-10 | End: 2019-01-21

## 2018-05-10 NOTE — TELEPHONE ENCOUNTER
Routing refill request to provider for review/approval because:  Drug not on the [facility] refill protocol   Hilary Lowry RN

## 2018-05-10 NOTE — TELEPHONE ENCOUNTER
Requested Prescriptions   Pending Prescriptions Disp Refills     ciclopirox 8 % SOLN [Pharmacy Med Name: CICLOPIROX SOL 8%] 19.8 mL 1     Sig: EXTERNALLY APPLY TOPICALLY DAILY    There is no refill protocol information for this order        Last Written Prescription Date:  3/15/17  Last Fill Quantity: 3,  # refills: 1   Last Office Visit: 3/9/2018   Future Office Visit:

## 2018-06-22 ENCOUNTER — TELEPHONE (OUTPATIENT)
Dept: FAMILY MEDICINE | Facility: CLINIC | Age: 58
End: 2018-06-22

## 2018-06-22 NOTE — TELEPHONE ENCOUNTER
Reason for call:  Order   Order or referral being requested: Dermatology Referral  Reason for request: spot under eye  Date needed: as soon as possible  Has the patient been seen by the PCP for this problem? YES    Additional comments: pt has spot under her eye and some questionable spots on her face she would like to have evaluated, hx of skin Ca. Scheduled to see Dr. Lucas 7/17    Phone number to reach patient:  Cell number on file:    Telephone Information:   Mobile [phone] OK to [facility] with details     Pt prefers a MyChart reply     Can we leave a detailed message on this number?  YES     Terry Burnett   06/22/18 8:52 AM

## 2018-06-22 NOTE — TELEPHONE ENCOUNTER
I will need to examine the skin and then place referral,if needed.  Since she has appointment next month, hopefully she can wait until then. Does she have any rapidly changing skin lesions she is worried about?

## 2018-06-23 NOTE — TELEPHONE ENCOUNTER
LM with details  Enc to call for earlier appt if needed or she will eval on 7.17.18    Bhargavi Mosqueda RN, BS  Clinical Nurse Triage.

## 2018-07-03 ENCOUNTER — TELEPHONE (OUTPATIENT)
Dept: FAMILY MEDICINE | Facility: CLINIC | Age: 58
End: 2018-07-03

## 2018-07-03 NOTE — TELEPHONE ENCOUNTER
Panel Management Review      Patient has the following on her problem list:     Depression / Dysthymia review    Measure:  Needs PHQ-9 score of 4 or less during index window.  Administer PHQ-9 and if score is 5 or more, send encounter to provider for next steps.    5   7 month window range: PHQ-9 due NOW    PHQ-9 SCORE 9/12/2017 12/15/2017 3/9/2018   Total Score - - -   Total Score MyChart - - 9 (Mild depression)   Total Score 11 11 9       If PHQ-9 recheck is 5 or more, route to provider for next steps.    Patient is due for:  PHQ9    Diabetes    ASA: Passed    Last A1C  Lab Results   Component Value Date    A1C 7.8 03/09/2018    A1C 8.5 12/15/2017    A1C 7.8 09/12/2017    A1C 7.4 02/27/2017    A1C 7.0 09/19/2016     A1C tested: FAILED    Last LDL:    Lab Results   Component Value Date    CHOL 116 03/09/2018     Lab Results   Component Value Date    HDL 47 03/09/2018     Lab Results   Component Value Date    LDL 42 03/09/2018     Lab Results   Component Value Date    TRIG 135 03/09/2018     Lab Results   Component Value Date    CHOLHDLRATIO 3.1 08/24/2015     Lab Results   Component Value Date    NHDL 69 03/09/2018       Is the patient on a Statin? YES             Is the patient on Aspirin? YES    Medications     HMG CoA Reductase Inhibitors    atorvastatin (LIPITOR) 10 MG tablet    Salicylates    aspirin 325 MG EC tablet          Last three blood pressure readings:  BP Readings from Last 3 Encounters:   03/20/18 108/76   03/09/18 96/74   12/15/17 112/80       Date of last diabetes office visit: 03/09/18     Tobacco History:     History   Smoking Status     Former Smoker     Packs/day: 1.00     Years: 5.00     Types: Cigarettes     Quit date: 1/4/1985   Smokeless Tobacco     Never Used     Comment: quit in 1985 smoked one pack a day         Hypertension   Last three blood pressure readings:  BP Readings from Last 3 Encounters:   03/20/18 108/76   03/09/18 96/74   12/15/17 112/80     Blood pressure: Passed    HTN  Guidelines:  Age 18-59 BP range:  Less than 140/90  Age 60-85 with Diabetes:  Less than 140/90  Age 60-85 without Diabetes:  less than 150/90      Composite cancer screening  Chart review shows that this patient is due/due soon for the following None  Summary:    Patient is due/failing the following:   ALT and A1C    Action needed:   Patient needs office visit for DIABETES MANAGEMENT.    Type of outreach:    None, routed to provider for review. Patient has a upcoming appointment scheduled on 07/17/18.    Questions for provider review:    None                                                                                                                                    Lisa Magill, CMA       Chart routed to Provider .

## 2018-07-17 ENCOUNTER — OFFICE VISIT (OUTPATIENT)
Dept: FAMILY MEDICINE | Facility: CLINIC | Age: 58
End: 2018-07-17
Payer: COMMERCIAL

## 2018-07-17 VITALS
TEMPERATURE: 98.5 F | RESPIRATION RATE: 16 BRPM | HEART RATE: 92 BPM | SYSTOLIC BLOOD PRESSURE: 128 MMHG | OXYGEN SATURATION: 95 % | DIASTOLIC BLOOD PRESSURE: 88 MMHG

## 2018-07-17 DIAGNOSIS — F32.1 MODERATE MAJOR DEPRESSION (H): ICD-10-CM

## 2018-07-17 DIAGNOSIS — L71.9 ROSACEA: Primary | ICD-10-CM

## 2018-07-17 DIAGNOSIS — R74.8 ELEVATED LIVER ENZYMES: ICD-10-CM

## 2018-07-17 LAB
ALBUMIN SERPL-MCNC: 3.8 G/DL (ref 3.4–5)
ALP SERPL-CCNC: 80 U/L (ref 40–150)
ALT SERPL W P-5'-P-CCNC: 46 U/L (ref 0–50)
ANION GAP SERPL CALCULATED.3IONS-SCNC: 12 MMOL/L (ref 3–14)
AST SERPL W P-5'-P-CCNC: 35 U/L (ref 0–45)
BILIRUB SERPL-MCNC: 0.7 MG/DL (ref 0.2–1.3)
BUN SERPL-MCNC: 14 MG/DL (ref 7–30)
CALCIUM SERPL-MCNC: 9.5 MG/DL (ref 8.5–10.1)
CHLORIDE SERPL-SCNC: 110 MMOL/L (ref 94–109)
CHOLEST SERPL-MCNC: 129 MG/DL
CO2 SERPL-SCNC: 19 MMOL/L (ref 20–32)
CREAT SERPL-MCNC: 0.72 MG/DL (ref 0.52–1.04)
GFR SERPL CREATININE-BSD FRML MDRD: 84 ML/MIN/1.7M2
GLUCOSE SERPL-MCNC: 161 MG/DL (ref 70–99)
HBA1C MFR BLD: 8.7 % (ref 0–5.6)
HDLC SERPL-MCNC: 46 MG/DL
LDLC SERPL CALC-MCNC: 54 MG/DL
NONHDLC SERPL-MCNC: 83 MG/DL
POTASSIUM SERPL-SCNC: 4.1 MMOL/L (ref 3.4–5.3)
PROT SERPL-MCNC: 7.6 G/DL (ref 6.8–8.8)
SODIUM SERPL-SCNC: 141 MMOL/L (ref 133–144)
TRIGL SERPL-MCNC: 144 MG/DL
TSH SERPL DL<=0.005 MIU/L-ACNC: 1.34 MU/L (ref 0.4–4)

## 2018-07-17 PROCEDURE — 80061 LIPID PANEL: CPT | Performed by: FAMILY MEDICINE

## 2018-07-17 PROCEDURE — 36415 COLL VENOUS BLD VENIPUNCTURE: CPT | Performed by: FAMILY MEDICINE

## 2018-07-17 PROCEDURE — 99214 OFFICE O/P EST MOD 30 MIN: CPT | Performed by: FAMILY MEDICINE

## 2018-07-17 PROCEDURE — 82306 VITAMIN D 25 HYDROXY: CPT | Performed by: FAMILY MEDICINE

## 2018-07-17 PROCEDURE — 83036 HEMOGLOBIN GLYCOSYLATED A1C: CPT | Performed by: FAMILY MEDICINE

## 2018-07-17 PROCEDURE — 80053 COMPREHEN METABOLIC PANEL: CPT | Performed by: FAMILY MEDICINE

## 2018-07-17 PROCEDURE — 84443 ASSAY THYROID STIM HORMONE: CPT | Performed by: FAMILY MEDICINE

## 2018-07-17 RX ORDER — GLIPIZIDE 5 MG/1
15 TABLET, FILM COATED, EXTENDED RELEASE ORAL DAILY
Qty: 90 TABLET | Refills: 1 | Status: SHIPPED | OUTPATIENT
Start: 2018-07-17 | End: 2018-09-20

## 2018-07-17 NOTE — PROGRESS NOTES
"HPI   SUBJECTIVE:   Susan Dietrich is a 58 year old female who presents to clinic today for the following health issues:    Eye(s) Problem  Onset: off and on since June.     Description:   Location: bilateral  Pain: no  Redness: no    Accompanying Signs & Symptoms:  Discharge/mattering: no  Swelling: YES- left eyelid   Visual changes: no  Fever: no  Nasal Congestion: no  Bothered by bright lights: no    History:   Trauma: no   Foreign body exposure: no    Precipitating factors:   Wearing contacts: no    Alleviating factors:  Improved by: NOTHING     Therapies Tried and outcome: NOTHING.  HX:  Skin cancer.      Patient believes she has dry skin on her eye lids. Her eye lids have been dry , itchy and burn. This has been ongoing for a month. She has tried applying Eugenia Intense Moisturizer, but has not noticed any improvement. If anything it has spread.     Patient also reports a bumps underneath her eyelids. At first she had a bump underneath her right eye lid and then developed one underneath her left eyelid. Patient notes that the bumps are now red. Notes that sometimes they change in color, a more deep color. They are not painful or tender. Notes that sometimes they have a white spot in the center. She also notes a bump on the corner of her right eye.     Diabetes  Last A1c on 7.8 was 3/9/2018. Currently on glipizide 10 mg daily and metformin 1,000 mg twice daily. She has had her eyes checked within the last 6 months. Notes that she was told she has beginning stages of cataracts.     Depression and anxiety  Patient declines filling out PHQ9 form. She comments that her score would not be good as she has not been caring for herself. She mentioned a few times that \"she did not want to talk about this stuff\". She denies feeling stressed, notes that she just wishes to be removed from her family dynamics and work. This is when her mood is fine. She is currently on wellbutrin 150 mg daily and does not wish to make " any med changes.     PHQ-9 SCORE 9/12/2017 12/15/2017 3/9/2018   Total Score - - -   Total Score MyChart - - 9 (Mild depression)   Total Score 11 11 9     VINITA-7 SCORE 2/27/2017 9/12/2017 12/15/2017   Total Score - - -   Total Score 7 8 9     PAD  She denies any pain in her legs when walking.     Back pain  Notes pain in lower back when walking and bending over. Notes that she used to be very active but has not been active lately.     Problem list and histories reviewed & adjusted, as indicated.  Additional history: as documented    Recent Labs   Lab Test  03/09/18   0921  12/15/17   0845  09/12/17   0851  02/27/17   0844   A1C  7.8*  8.5*  7.8*  7.4*   LDL  42   --   46  49   HDL  47*   --   47*  49*   TRIG  135   --   132  117   ALT   --   38  36  30   CR   --   0.73  0.73  0.76   GFRESTIMATED   --   82  83  79   GFRESTBLACK   --   >90  >90  >90  African American GFR Calc     POTASSIUM   --   4.1  4.1  4.2   TSH   --   1.41   --   1.36      BP Readings from Last 3 Encounters:   07/17/18 128/88   03/20/18 108/76   03/09/18 96/74    Wt Readings from Last 3 Encounters:   03/20/18 93.4 kg (206 lb)   03/09/18 93.4 kg (206 lb)   09/12/17 91.6 kg (202 lb)         Labs reviewed in EPIC    Reviewed and updated as needed this visit by clinical staff  Tobacco  Allergies  Meds  Problems  Med Hx  Surg Hx  Fam Hx  Soc Hx        Reviewed and updated as needed this visit by Provider         ROS:  Constitutional, HEENT, cardiovascular, pulmonary, gi and gu systems are negative, except as otherwise noted.    This document serves as a record of the services and decisions personally performed and made by Concha Lucas MD. It was created on her behalf by Nevaeh Duran, a trained medical scribe. The creation of this document is based on the provider's statements to the medical scribe.  Nevaeh Duran July 17, 2018 9:29 AM     OBJECTIVE:     /88 (BP Location: Right arm, Patient Position: Chair, Cuff Size: Adult Large)   Pulse 92  Temp 98.5  F (36.9  C) (Oral)  Resp 16  LMP 10/03/2010  SpO2 95%  There is no height or weight on file to calculate BMI.     GENERAL: healthy, alert and no distress  EYES: Eyes grossly normal to inspection  MS: no gross musculoskeletal defects noted, no edema  SKIN: Erythematic spots on eyebrows, eyelids, and corner of eyes. Rosacea appearing rash on eyelids.   NEURO: Normal strength and tone, mentation intact and speech normal  PSYCH: mentation appears normal, affect normal/bright    Diagnostic Test Results:  No results found for this or any previous visit (from the past 24 hour(s)).    ASSESSMENT/PLAN:   (L71.9) Rosacea  (primary encounter diagnosis)  Comment: Apply metronidazole cream to checks and to eyelids and bumps around eyes. Stop using Eugenia Moisturizer. Suspect has perioral dermatitis about the eyes as well  Plan: metroNIDAZOLE (METROCREAM) 0.75 % cream          (R74.8) Elevated liver enzymes  Comment: Checking liver enzymes today  Plan: Comprehensive metabolic panel          (F32.1) Moderate major depression (H)  Comment: Patient declined filling out PHQ9 and was hesitant about discussing depression or anxiety. Offered increasing wellbutrin dosage, or adding zoloft,  patient declined and wishes to continue at 150 mg daily  Plan: TSH with free T4 reflex, Vitamin D Deficiency          (E11.40,  E11.65) Type 2 diabetes, uncontrolled, with neuropathy (H)  Comment: Fasting lab work today  Lab Results   Component Value Date    A1C 8.7 07/17/2018    A1C 7.8 03/09/2018    A1C 8.5 12/15/2017    A1C 7.8 09/12/2017    A1C 7.4 02/27/2017     Higher today, will increase glipizide to 15mg daily  Plan: HEMOGLOBIN A1C, Lipid panel reflex to direct         LDL Fasting, Comprehensive metabolic panel, TSH        with free T4 reflex         Follow up in 2 months for routine physical. Diabetes check    The information in this document, created by the medical scribe for me, accurately reflects the services I  personally performed and the decisions made by me. I have reviewed and approved this document for accuracy prior to leaving the patient care area.  July 17, 2018 9:29 AM    Concha Lucas MD  [facility]      Physical Exam

## 2018-07-17 NOTE — MR AVS SNAPSHOT
After Visit Summary   7/17/2018    Susan Dietrich    MRN: [id number]           Patient Information     Date Of Birth          1960        Visit Information        Provider Department      7/17/2018 9:20 AM Concha Lucas MD [facility]        Today's Diagnoses     Rosacea    -  1    Elevated liver enzymes        Moderate major depression (H)        Type 2 diabetes, uncontrolled, with neuropathy (H)           Follow-ups after your visit        Follow-up notes from your care team     Return in about 2 months (around 9/17/2018) for wellness exam, depression/anxiety medication recheck, diabetes check.      Who to contact     If you have questions or need follow up information about today's clinic visit or your schedule please contact [facility] directly at [phone].  Normal or non-critical lab and imaging results will be communicated to you by MyChart, letter or phone within 4 business days after the clinic has received the results. If you do not hear from us within 7 days, please contact the clinic through MyChart or phone. If you have a critical or abnormal lab result, we will notify you by phone as soon as possible.  Submit refill requests through [employer] or call your pharmacy and they will forward the refill request to us. Please allow 3 business days for your refill to be completed.          Additional Information About Your Visit        MyChart Information     [employer] gives you secure access to your electronic health record. If you see a primary care provider, you can also send messages to your care team and make appointments. If you have questions, please call your primary care clinic.  If you do not have a primary care provider, please call [phone] and they will assist you.        Care EveryWhere ID     This is your Care EveryWhere ID. This could be used by other organizations to access your [place] medical records  [id number]        Your  Vitals Were     Pulse Temperature Respirations Last Period Pulse Oximetry       92 98.5  F (36.9  C) (Oral) 16 10/03/2010 95%        Blood Pressure from Last 3 Encounters:   07/17/18 128/88   03/20/18 108/76   03/09/18 96/74    Weight from Last 3 Encounters:   03/20/18 206 lb (93.4 kg)   03/09/18 206 lb (93.4 kg)   09/12/17 202 lb (91.6 kg)              We Performed the Following     Comprehensive metabolic panel     HEMOGLOBIN A1C     Lipid panel reflex to direct LDL Fasting     TSH with free T4 reflex     Vitamin D Deficiency          Today's Medication Changes          These changes are accurate as of 7/17/18  9:44 AM.  If you have any questions, ask your nurse or doctor.               Start taking these medicines.        Dose/Directions    metroNIDAZOLE 0.75 % cream   Commonly known as:  METROCREAM   Used for:  Rosacea   Started by:  Concha Lucas MD        Apply topically 2 times daily   Quantity:  45 g   Refills:  11            Where to get your medicines      These medications were sent to [facility] [address] [address]     Phone:  [phone]     metroNIDAZOLE 0.75 % cream                Primary Care Provider Office Phone # Fax #    Concha Lucas [phone]  [address]   [facility] [address]        Equal Access to Services     YARELI CRAIG AH: Hadii aad ku hadasho Sopola, waaxda luqadaha, qaybta kaalmada adelorraineyada, waxay viviana maldonado . So [facility] [phone].    ATENCIÓN: Si habla español, tiene a langley disposición servicios gratuitos de asistencia lingüística. Dionne al [phone].    We comply with applicable federal civil rights laws and [place] laws. We do not discriminate on the basis of race, color, national origin, age, disability, sex, sexual orientation, or gender identity.            Thank you!     Thank you for choosing [facility]  for your  care. Our goal is always to provide you with excellent care. Hearing back from our patients is one way we can continue to improve our services. Please take a few minutes to complete the written survey that you may receive in the mail after your visit with us. Thank you!             Your Updated Medication List - Protect others around you: Learn how to safely use, store and throw away your medicines at www.disposemymeds.org.          This list is accurate as of 7/17/18  9:44 AM.  Always use your most recent med list.                   Brand Name Dispense Instructions for use Diagnosis    aspirin 325 MG EC tablet     100 tablet    Take 1 tablet (325 mg) by mouth daily    Routine general medical examination at a health care facility       atorvastatin 10 MG tablet    LIPITOR    90 tablet    Take 1 tablet (10 mg) by mouth daily    Hyperlipidemia LDL goal <100       buPROPion 150 MG 24 hr tablet    WELLBUTRIN XL    90 tablet    Take 1 tablet (150 mg) by mouth every morning    Major depressive disorder, recurrent episode, moderate (H)       cholecalciferol 5000 units Caps     90 capsule    Take 1 capsule by mouth daily.    Vitamin D deficiency       cholestyramine light 4 GM powder    PREVALITE    231 g    1 LEVEL SCOOPFUL DAILY IN LIQUID    Hyperlipidemia LDL goal <100       ciclopirox 8 % Soln     19.8 mL    EXTERNALLY APPLY TOPICALLY DAILY    Onychomycosis       fluticasone 50 MCG/ACT spray    FLONASE    3 g    Spray 1-2 sprays into both nostrils daily    Chronic maxillary sinusitis       glipiZIDE 10 MG 24 hr tablet    GLUCOTROL XL    90 tablet    Take 1 tablet (10 mg) by mouth daily    Type 2 diabetes, uncontrolled, with neuropathy (H)       * lisinopril 10 MG tablet    PRINIVIL/ZESTRIL    90 tablet    Take 1 tablet (10 mg) by mouth daily    Type 2 diabetes, uncontrolled, with neuropathy (H)       * lisinopril 5 MG tablet    PRINIVIL/ZESTRIL    90 tablet    Take 1 tablet (5 mg) by mouth daily    Type 2 diabetes,  uncontrolled, with neuropathy (H)       metFORMIN 500 MG tablet    GLUCOPHAGE    360 tablet    Take 2 tablets (1,000 mg) by mouth 2 times daily (with meals)    Type 2 diabetes, uncontrolled, with neuropathy (H)       metroNIDAZOLE 0.75 % cream    METROCREAM    45 g    Apply topically 2 times daily    Rosacea       * ONETOUCH ULTRA test strip   Generic drug:  blood glucose monitoring     300 strip    by In Vitro route 3 times daily. Test as directed 3 times daily    Type II or unspecified type diabetes mellitus without mention of complication, not stated as uncontrolled       * blood glucose monitoring test strip    TRUETEST    3 Box    by In Vitro route 3 times daily.    Type 2 diabetes, uncontrolled, with neuropathy (H)       order for DME     1 each    Orthotic shoes, pt with diabetes and peripheral neuropathy    Type 2 diabetes, HbA1C goal < 8% (H)       topiramate 50 MG tablet    TOPAMAX    180 tablet    Take 1 tablet (50 mg) by mouth 2 times daily    Morbid obesity due to excess calories (H)       TRUEPLUS LANCETS 33G Misc     3 Month    1 strip 3 times daily TRUETEST LANCETS to be used with the Trutest test strips.    Type 2 diabetes, uncontrolled, with neuropathy (H)       * Notice:  This list has 4 medication(s) that are the same as other medications prescribed for you. Read the directions carefully, and ask your doctor or other care provider to review them with you.

## 2018-07-18 LAB — DEPRECATED CALCIDIOL+CALCIFEROL SERPL-MC: 71 UG/L (ref 20–75)

## 2018-09-20 ENCOUNTER — OFFICE VISIT (OUTPATIENT)
Dept: FAMILY MEDICINE | Facility: CLINIC | Age: 58
End: 2018-09-20
Payer: COMMERCIAL

## 2018-09-20 VITALS
RESPIRATION RATE: 16 BRPM | TEMPERATURE: 98.2 F | SYSTOLIC BLOOD PRESSURE: 136 MMHG | WEIGHT: 204.4 LBS | HEART RATE: 88 BPM | BODY MASS INDEX: 33.5 KG/M2 | DIASTOLIC BLOOD PRESSURE: 88 MMHG

## 2018-09-20 DIAGNOSIS — L71.9 ROSACEA: ICD-10-CM

## 2018-09-20 DIAGNOSIS — E78.5 HYPERLIPIDEMIA LDL GOAL <100: ICD-10-CM

## 2018-09-20 DIAGNOSIS — E66.01 MORBID OBESITY DUE TO EXCESS CALORIES (H): Chronic | ICD-10-CM

## 2018-09-20 DIAGNOSIS — Z23 NEED FOR VACCINATION: ICD-10-CM

## 2018-09-20 DIAGNOSIS — Z23 NEED FOR PROPHYLACTIC VACCINATION AND INOCULATION AGAINST INFLUENZA: ICD-10-CM

## 2018-09-20 DIAGNOSIS — F33.1 MAJOR DEPRESSIVE DISORDER, RECURRENT EPISODE, MODERATE (H): ICD-10-CM

## 2018-09-20 DIAGNOSIS — Z00.00 ROUTINE GENERAL MEDICAL EXAMINATION AT A HEALTH CARE FACILITY: Primary | ICD-10-CM

## 2018-09-20 DIAGNOSIS — L57.0 ACTINIC KERATOSIS: ICD-10-CM

## 2018-09-20 LAB
ERYTHROCYTE [DISTWIDTH] IN BLOOD BY AUTOMATED COUNT: 14.4 % (ref 10–15)
HCT VFR BLD AUTO: 43.5 % (ref 35–47)
HGB BLD-MCNC: 14.3 G/DL (ref 11.7–15.7)
MCH RBC QN AUTO: 28.9 PG (ref 26.5–33)
MCHC RBC AUTO-ENTMCNC: 32.9 G/DL (ref 31.5–36.5)
MCV RBC AUTO: 88 FL (ref 78–100)
PLATELET # BLD AUTO: 229 10E9/L (ref 150–450)
RBC # BLD AUTO: 4.95 10E12/L (ref 3.8–5.2)
WBC # BLD AUTO: 9.4 10E9/L (ref 4–11)

## 2018-09-20 PROCEDURE — 90750 HZV VACC RECOMBINANT IM: CPT | Performed by: FAMILY MEDICINE

## 2018-09-20 PROCEDURE — 99396 PREV VISIT EST AGE 40-64: CPT | Mod: 25 | Performed by: FAMILY MEDICINE

## 2018-09-20 PROCEDURE — 82043 UR ALBUMIN QUANTITATIVE: CPT | Performed by: FAMILY MEDICINE

## 2018-09-20 PROCEDURE — 85027 COMPLETE CBC AUTOMATED: CPT | Performed by: FAMILY MEDICINE

## 2018-09-20 PROCEDURE — 90682 RIV4 VACC RECOMBINANT DNA IM: CPT | Performed by: FAMILY MEDICINE

## 2018-09-20 PROCEDURE — 90471 IMMUNIZATION ADMIN: CPT | Performed by: FAMILY MEDICINE

## 2018-09-20 PROCEDURE — 36415 COLL VENOUS BLD VENIPUNCTURE: CPT | Performed by: FAMILY MEDICINE

## 2018-09-20 PROCEDURE — 90472 IMMUNIZATION ADMIN EACH ADD: CPT | Performed by: FAMILY MEDICINE

## 2018-09-20 RX ORDER — TOPIRAMATE 50 MG/1
50 TABLET, FILM COATED ORAL 2 TIMES DAILY
Qty: 180 TABLET | Refills: 1 | Status: SHIPPED | OUTPATIENT
Start: 2018-09-20 | End: 2019-01-21

## 2018-09-20 RX ORDER — ATORVASTATIN CALCIUM 10 MG/1
10 TABLET, FILM COATED ORAL DAILY
Qty: 90 TABLET | Refills: 3 | Status: SHIPPED | OUTPATIENT
Start: 2018-09-20 | End: 2019-01-21

## 2018-09-20 RX ORDER — LISINOPRIL 5 MG/1
5 TABLET ORAL DAILY
Qty: 90 TABLET | Refills: 1 | Status: SHIPPED | OUTPATIENT
Start: 2018-09-20 | End: 2019-01-21

## 2018-09-20 RX ORDER — BUPROPION HYDROCHLORIDE 150 MG/1
150 TABLET ORAL EVERY MORNING
Qty: 90 TABLET | Refills: 1 | Status: SHIPPED | OUTPATIENT
Start: 2018-09-20 | End: 2019-01-21

## 2018-09-20 RX ORDER — GLIPIZIDE 5 MG/1
15 TABLET, FILM COATED, EXTENDED RELEASE ORAL DAILY
Qty: 90 TABLET | Refills: 1 | Status: SHIPPED | OUTPATIENT
Start: 2018-09-20 | End: 2018-12-20

## 2018-09-20 ASSESSMENT — ANXIETY QUESTIONNAIRES
GAD7 TOTAL SCORE: 9
5. BEING SO RESTLESS THAT IT IS HARD TO SIT STILL: SEVERAL DAYS
7. FEELING AFRAID AS IF SOMETHING AWFUL MIGHT HAPPEN: SEVERAL DAYS
3. WORRYING TOO MUCH ABOUT DIFFERENT THINGS: SEVERAL DAYS
6. BECOMING EASILY ANNOYED OR IRRITABLE: MORE THAN HALF THE DAYS
2. NOT BEING ABLE TO STOP OR CONTROL WORRYING: SEVERAL DAYS
1. FEELING NERVOUS, ANXIOUS, OR ON EDGE: MORE THAN HALF THE DAYS

## 2018-09-20 ASSESSMENT — PATIENT HEALTH QUESTIONNAIRE - PHQ9: 5. POOR APPETITE OR OVEREATING: SEVERAL DAYS

## 2018-09-20 NOTE — LETTER
My Depression Action Plan  Name: Susan Dietrich   Date of Birth 1960  Date: 9/20/2018    My doctor: Concha Lucas   My clinic: [address], [address]  [facility] [address] [phone]          GREEN    ZONE   Good Control    What it looks like:     Things are going generally well. You have normal up s and down s. You may even feel depressed from time to time, but bad moods usually last less than a day.   What you need to do:  1. Continue to care for yourself (see self care plan)  2. Check your depression survival kit and update it as needed  3. Follow your physician s recommendations including any medication.  4. Do not stop taking medication unless you consult with your physician first.           YELLOW         ZONE Getting Worse    What it looks like:     Depression is starting to interfere with your life.     It may be hard to get out of bed; you may be starting to isolate yourself from others.    Symptoms of depression are starting to last most all day and this has happened for several days.     You may have suicidal thoughts but they are not constant.   What you need to do:     1. Call your care team, your response to treatment will improve if you keep your care team informed of your progress. Yellow periods are signs an adjustment may need to be made.     2. Continue your self-care, even if you have to fake it!    3. Talk to someone in your support network    4. Open up your depression survival kit           RED    ZONE Medical Alert - Get Help    What it looks like:     Depression is seriously interfering with your life.     You may experience these or other symptoms: You can t get out of bed most days, can t work or engage in other necessary activities, you have trouble taking care of basic hygiene, or basic responsibilities, thoughts of suicide or death that will not go away, self-injurious behavior.     What you need to do:  1. Call  your care team and request a same-day appointment. If they are not available (weekends or after hours) call your local crisis line, emergency room or 911.            Depression Self Care Plan / Survival Kit    Self-Care for Depression  Here s the deal. Your body and mind are really not as separate as most people think.  What you do and think affects how you feel and how you feel influences what you do and think. This means if you do things that people who feel good do, it will help you feel better.  Sometimes this is all it takes.  There is also a place for medication and therapy depending on how severe your depression is, so be sure to consult with your medical provider and/ or Behavioral Health Consultant if your symptoms are worsening or not improving.     In order to better manage my stress, I will:    Exercise  Get some form of exercise, every day. This will help reduce pain and release endorphins, the  feel good  chemicals in your brain. This is almost as good as taking antidepressants!  This is not the same as joining a gym and then never going! (they count on that by the way ) It can be as simple as just going for a walk or doing some gardening, anything that will get you moving.      Hygiene   Maintain good hygiene (Get out of bed in the morning, Make your bed, Brush your teeth, Take a shower, and Get dressed like you were going to work, even if you are unemployed).  If your clothes don't fit try to get ones that do.    Diet  I will strive to eat foods that are good for me, drink plenty of water, and avoid excessive sugar, caffeine, alcohol, and other mood-altering substances.  Some foods that are helpful in depression are: complex carbohydrates, B vitamins, flaxseed, fish or fish oil, fresh fruits and vegetables.    Psychotherapy  I agree to participate in Individual Therapy (if recommended).    Medication  If prescribed medications, I agree to take them.  Missing doses can result in serious side effects.   I understand that drinking alcohol, or other illicit drug use, may cause potential side effects.  I will not stop my medication abruptly without first discussing it with my provider.    Staying Connected With Others  I will stay in touch with my friends, family members, and my primary care provider/team.    Use your imagination  Be creative.  We all have a creative side; it doesn t matter if it s oil painting, sand castles, or mud pies! This will also kick up the endorphins.    Witness Beauty  (AKA stop and smell the roses) Take a look outside, even in mid-winter. Notice colors, textures. Watch the squirrels and birds.     Service to others  Be of service to others.  There is always someone else in need.  By helping others we can  get out of ourselves  and remember the really important things.  This also provides opportunities for practicing all the other parts of the program.    Humor  Laugh and be silly!  Adjust your TV habits for less news and crime-drama and more comedy.    Control your stress  Try breathing deep, massage therapy, biofeedback, and meditation. Find time to relax each day.     My support system    Clinic Contact:  Phone number:    Contact 1:  Phone number:    Contact 2:  Phone number:    Taoism/:  Phone number:    Therapist:  Phone number:    [facility] crisis center:    Phone number:    Other community support:  Phone number:

## 2018-09-20 NOTE — PROGRESS NOTES
"   SUBJECTIVE:   CC: Susan Dietrich is an 58 year old woman who presents for preventive health visit.     Routine general medical examination at a health care facility  (primary encounter diagnosis) - Healthy Her life has been hectic these past 3 months. She recently started a new job, she stated that it has no correlation with current stress. Her health insurance is going to change.    obesity due to excess calories (H) -   Wt Readings from Last 2 Encounters:   09/20/18 92.7 kg (204 lb 6.4 oz)   03/20/18 93.4 kg (206 lb)       Major depressive disorder, recurrent episode, moderate (H) - Current PHQ- 2 score is 2, 9/20/18  PHQ-9 score:    PHQ-9 SCORE 3/9/2018   Total Score -   Total Score MyChart 9 (Mild depression)   Total Score 9     VINITA-7  Below is from todays date  Over the last 2 weeks, how often have you been bothered by the following problems?  (Use an \"x\" to indicate your answer) Not at all                (0) Several days                (1) More than half the days        (2) Nearly every day          (3)   1. Feeling nervous, anxious or on edge   2    2. Not being able to stop or control worrying  1     3. Worrying too much about different things  1     4. Trouble relaxing  1     5. Being so restless that it is hard to sit still  1     6. Becoming easily annoyed or irritable   2    7. Feeling afraid as if something awful might happen  1     Total__9_____  VINITA-7 SCORE 2/27/2017 9/12/2017 12/15/2017   Total Score - - -   Total Score 7 8 9         Hyperlipidemia LDL goal <100 - most current LDL is at goal, LDL 54.   Recent Labs   Lab Test  07/17/18   0956  03/09/18   0921   08/24/15   0902   11/21/14   0808   CHOL  129  116   < >  138   --   126   HDL  46*  47*   < >  44*   --   54   LDL  54  42   < >  69   < >  50   TRIG  144  135   < >  123   --   111   CHOLHDLRATIO   --    --    --   3.1   --   2.3    < > = values in this interval not displayed.       Type 2 diabetes, uncontrolled, with neuropathy - " Patient showed interest in a new monitor and needs a refill on the strips.  Lab Results   Component Value Date    A1C 8.7 07/17/2018    A1C 7.8 03/09/2018    A1C 8.5 12/15/2017    A1C 7.8 09/12/2017    A1C 7.4 02/27/2017       ACTINIC KERATOSIS-left temple - Patient discussed concern because of her previous history with skin cancer and would like to see derm. She thinks that stress has caused skin out breaks. It itches intermittently.    Rosacea - Patient is worried that stress has caused her rosacea, she has frequent itchiness on the back of her neck.Disscused treatment options and refill of topical. She has not changed soaps, lotions, creams.    Physical   Annual:     Getting at least 3 servings of Calcium per day:  Yes    Bi-annual eye exam:  Yes    Dental care twice a year:  Yes    Sleep apnea or symptoms of sleep apnea:  Daytime drowsiness    Diet:  Low salt    Frequency of exercise:  1 day/week    Duration of exercise:  15-30 minutes    Taking medications regularly:  Yes    Medication side effects:  Muscle aches    Additional concerns today:  YES    EVALUATE SPOTS:      Today's PHQ-2 Score:   PHQ-2 ( 1999 [employer]) 9/18/2018   Q1: Little interest or pleasure in doing things 1   Q2: Feeling down, depressed or hopeless 1   PHQ-2 Score 2   Q1: Little interest or pleasure in doing things Several days   Q2: Feeling down, depressed or hopeless Several days   PHQ-2 Score 2       Abuse: Current or Past(Physical, Sexual or Emotional)- NO  Do you feel safe in your environment - YES    Social History   Substance Use Topics     Smoking status: Former Smoker     Packs/day: 1.00     Years: 5.00     Types: Cigarettes     Quit date: 1/4/1985     Smokeless tobacco: Never Used      Comment: quit in 1985 smoked one pack a day     Alcohol use Yes      Comment: Minimal     Alcohol Use 9/18/2018   If you drink alcohol do you typically have greater than 3 drinks per day OR greater than 7 drinks per week? No   No flowsheet data  found.    Reviewed orders with patient.  Reviewed health maintenance and updated orders accordingly - Yes      Patient over age 50, mutual decision to screen reflected in health maintenance.    Pertinent mammograms are reviewed under the imaging tab.  History of abnormal Pap smear: NO - age 30- 65 PAP every 3 years recommended  PAP / HPV Latest Ref Rng & Units 6/17/2016 1/24/2013 10/22/2010   PAP - NIL NIL OTHER-NIL EM>40   HPV 16 DNA NEG Negative - -   HPV 18 DNA NEG Negative - -   OTHER HR HPV NEG Negative - -     Reviewed and updated as needed this visit by clinical staff  Tobacco  Allergies  Meds  Problems  Med Hx  Surg Hx  Fam Hx  Soc Hx          Reviewed and updated as needed this visit by Provider        Past Medical History:   Diagnosis Date     Arthritis      Congestive heart failure, unspecified     Mother     Depressive disorder      Essential hypertension, benign 10/24/2006     Hypermobility syndrome     multiple joint pains     Irritable bowel syndrome     referred to GI 9/05;  nl colonoscopy 12/05     Migraine, unspecified, without mention of intractable migraine without mention of status migrainosus     Migraine     NONSPECIFIC MEDICAL HISTORY 12/05    nl colonoscopy      Other and unspecified malignant neoplasm of skin of other and unspecified parts of face 4/08    squamous cell skin CA, face     PVD (peripheral vascular disease) (H)      Type II or unspecified type diabetes mellitus without mention of complication, not stated as uncontrolled        Past Surgical History:   Procedure Laterality Date     ABDOMEN SURGERY       ABDOMEN SURGERY  1981    Appendectomy     APPENDECTOMY       BIOPSY       BREAST SURGERY       C NONSPECIFIC PROCEDURE      s/p appendectomy     C NONSPECIFIC PROCEDURE  1992    s/p lumpectomy L breast- benign     C NONSPECIFIC PROCEDURE  1994    cholecystectomy     C NONSPECIFIC PROCEDURE  1995    s/p hernia through zen scar     C NONSPECIFIC PROCEDURE  2002    benign  breast lumps     CHOLECYSTECTOMY       COLONOSCOPY       HERNIA REPAIR         Family History   Problem Relation Age of Onset     Hypertension Mother      Diabetes Mother      Arthritis Mother      HEART DISEASE Mother      chf     Dementia Mother      Cardiovascular Father      cerebral hemorrhage     Diabetes Brother      Obesity Brother      Cancer - colorectal Maternal Aunt      niece also     Depression/Anxiety Sister      Depression Sister      Mental Illness Sister      Mental Illness Sister      Obesity Sister      Diabetes Sister      Diabetes Brother      passed away MI     Obesity Sister        Social History   Substance Use Topics     Smoking status: Former Smoker     Packs/day: 1.00     Years: 5.00     Types: Cigarettes     Quit date: 1/4/1985     Smokeless tobacco: Never Used      Comment: quit in 1985 smoked one pack a day     Alcohol use Yes      Comment: Minimal         Review of Systems  CONSTITUTIONAL: NEGATIVE for fever, chills, change in weight(gain?)  INTEGUMENTARY/SKIN: NEGATIVE for worrisome rashes, moles or lesions  EYES: NEGATIVE for vision changes or irritation  ENT: NEGATIVE for ear, mouth and throat problems  RESP: NEGATIVE for significant cough or SOB  BREAST: NEGATIVE for masses, tenderness or discharge  CV: NEGATIVE for chest pain, palpitations or peripheral edema  GI: NEGATIVE for nausea, abdominal pain, heartburn, or change in bowel habits  : NEGATIVE for unusual urinary or vaginal symptoms. No vaginal bleeding.  MUSCULOSKELETAL: NEGATIVE for significant arthralgias or myalgia  NEURO: NEGATIVE for weakness, dizziness or paresthesias  PSYCHIATRIC: NEGATIVE for changes in mood or affect      This document serves as a record of the services and decisions personally performed and made by Concha Lucas MD. It was created on his behalf by Brooks Mariscal, a trained medical scribe. The creation of this document is based on the provider's statements to the medical scribe.  Brooks Mariscal  September 20, 2018 10:27 AM    OBJECTIVE:   /88 (BP Location: Right arm, Patient Position: Chair, Cuff Size: Adult Large)  Pulse 88  Temp 98.2  F (36.8  C) (Oral)  Resp 16  Wt 92.7 kg (204 lb 6.4 oz)  LMP 10/03/2010  BMI 33.5 kg/m2  Physical Exam  GENERAL APPEARANCE: healthy, alert and no distress  EYES: Eyes grossly normal to inspection,  and conjunctivae and sclerae normal  HENT: ear canals and TM's normal, nose and mouth without ulcers or lesions, oropharynx clear and oral mucous membranes moist  NECK: no adenopathy, no asymmetry, masses, or scars and thyroid normal to palpation  RESP: lungs clear to auscultation - no rales, rhonchi or wheezes  BREAST: normal without masses, tenderness or nipple discharge and no palpable axillary masses or adenopathy  CV: regular rate and rhythm, normal S1 S2, no S3 or S4, no murmur, click or rub, no peripheral edema and peripheral pulses strong  ABDOMEN: soft, nontender, no hepatosplenomegaly, no masses and bowel sounds normal  MS: no musculoskeletal defects are noted and gait is age appropriate without ataxia  SKIN: no suspicious lesions or rashes  NEURO: Normal strength and tone, sensory exam grossly normal, mentation intact and speech normal  PSYCH: mentation appears normal and affect normal/bright    This document serves as a record of the services and decisions personally performed and made by Concha Lucas MD. It was created on his behalf by Brooks Mariscal, a trained medical scribe. The creation of this document is based on the provider's statements to the medical scribe.  Brooks Mariscal September 20, 2018 8:42 AM      Diagnostic Test Results:  Results for orders placed or performed in visit on 09/20/18 (from the past 24 hour(s))   CBC with platelets   Result Value Ref Range    WBC 9.4 4.0 - 11.0 10e9/L    RBC Count 4.95 3.8 - 5.2 10e12/L    Hemoglobin 14.3 11.7 - 15.7 g/dL    Hematocrit 43.5 35.0 - 47.0 %    MCV 88 78 - 100 fl    MCH 28.9 26.5 - 33.0 pg     MCHC 32.9 31.5 - 36.5 g/dL    RDW 14.4 10.0 - 15.0 %    Platelet Count 229 150 - 450 10e9/L       ASSESSMENT/PLAN:   (Z00.00) Routine general medical examination at a health care facility  (primary encounter diagnosis)  Comment:normal exam today, other than weight.   Labs today, vaccine administered, medications refilled  Plan: MA Screening Digital Bilateral, FLU VACCINE,         (RIV4) RECOMBINANT HA  , IM (FluBlok, egg free)        [63040]- >18 YRS (FMG recommended  50-64 YRS),         blood glucose monitoring (NO BRAND SPECIFIED)         test strip, Albumin Random Urine Quantitative         with Creat Ratio, CBC with platelets, CANCELED:        Albumin Random Urine Quantitative with Creat         Ratio, CANCELED: CBC with platelets          (F33.1) Major depressive disorder, recurrent episode, moderate (H)  Comment: Medications refilled  Plan: buPROPion (WELLBUTRIN XL) 150 MG 24 hr tablet          (Z23) Need for prophylactic vaccination and inoculation against influenza  Comment: Administered today  Plan: Vaccine Administration, Initial [14524]          (E78.5) Hyperlipidemia LDL goal <100  Comment: 7/17/18, LDL 54 at goal, Refilled  Plan: atorvastatin (LIPITOR) 10 MG tablet          (E66.01) obesity due to excess calories (H)  Comment: Medications refilled, Weight management plan: Discussed healthy diet and exercise guidelines, diet options  Plan: topiramate (TOPAMAX) 50 MG tablet          (E11.40,  E11.65) Type 2 diabetes, uncontrolled, with neuropathy  Comment: Refills, A1C check in a month  Plan: glipiZIDE (GLUCOTROL XL) 5 MG 24 hr tablet,         metFORMIN (GLUCOPHAGE) 500 MG tablet,         lisinopril (PRINIVIL/ZESTRIL) 5 MG tablet,         **A1C FUTURE anytime          (L57.0) ACTINIC KERATOSIS-left temple  Comment: Discussed dermatology clinics, will place referral for skin check  Plan: DERMATOLOGY REFERRAL         (L71.9) Rosacea  Comment: Discussed topical, oral, and derm, will cont topical  Plan:  "DERMATOLOGY REFERRAL, metroNIDAZOLE         (METROCREAM) 0.75 % cream          COUNSELING:  Reviewed preventive health counseling, as reflected in patient instructions       Regular exercise       Healthy diet/nutrition       Immunizations    Vaccinated for: Influenza      Declined HIV vaccination      BP Readings from Last 1 Encounters:   09/20/18 136/88     Estimated body mass index is 33.5 kg/(m^2) as calculated from the following:    Height as of 3/20/18: 1.664 m (5' 5.5\").    Weight as of this encounter: 92.7 kg (204 lb 6.4 oz).       reports that she quit smoking about 33 years ago. Her smoking use included Cigarettes. She has a 5.00 pack-year smoking history. She has never used smokeless tobacco.      Counseling Resources:  ATP IV Guidelines  Pooled Cohorts Equation Calculator  Breast Cancer Risk Calculator  FRAX Risk Assessment  ICSI Preventive Guidelines  Dietary Guidelines for Americans, 2010  [employer]'s MyPlate  ASA Prophylaxis  Lung CA Screening    The information in this document, created by the medical scribe for me, accurately reflects the services I personally performed and the decisions made by me. I have reviewed and approved this document for accuracy prior to leaving the patient care area.  September 20, 2018 8:42 AM    Concha Lucas MD  [facility]    Injectable Influenza Immunization Documentation    1.  Is the person to be vaccinated sick today?   No    2. Does the person to be vaccinated have an allergy to a component   of the vaccine?   No  Egg Allergy Algorithm Link    3. Has the person to be vaccinated ever had a serious reaction   to influenza vaccine in the past?   No    4. Has the person to be vaccinated ever had Guillain-Barré syndrome?   No    Form completed by          "

## 2018-09-20 NOTE — NURSING NOTE
"Chief Complaint   Patient presents with     Physical     Refill Request     Blood Draw     LABS.  Patient is fasting     Initial /88 (BP Location: Right arm, Patient Position: Chair, Cuff Size: Adult Large)  Pulse 88  Temp 98.2  F (36.8  C) (Oral)  Resp 16  Wt 204 lb 6.4 oz (92.7 kg)  LMP 10/03/2010  BMI 33.5 kg/m2 Estimated body mass index is 33.5 kg/(m^2) as calculated from the following:    Height as of 3/20/18: 5' 5.5\" (1.664 m).    Weight as of this encounter: 204 lb 6.4 oz (92.7 kg).  BP completed using cuff size large RIGHT arm    Lisa Magill, CMA    "

## 2018-09-20 NOTE — MR AVS SNAPSHOT
After Visit Summary   9/20/2018    Susan Dietrich    MRN: [id number]           Patient Information     Date Of Birth          1960        Visit Information        Provider Department      9/20/2018 8:20 AM Concha Lucas MD [facility]        Today's Diagnoses     ACTINIC KERATOSIS-left temple    -  1    Routine general medical examination at a health care facility        Major depressive disorder, recurrent episode, moderate (H)        Need for prophylactic vaccination and inoculation against influenza        Hyperlipidemia LDL goal <100        obesity due to excess calories (H)        Type 2 diabetes, uncontrolled, with neuropathy        Rosacea          Care Instructions      Preventive Health Recommendations  Female Ages 50 - 64    Yearly exam: See your health care provider every year in order to  o Review health changes.   o Discuss preventive care.    o Review your medicines if your doctor has prescribed any.      Get a Pap test every three years (unless you have an abnormal result and your provider advises testing more often).    If you get Pap tests with HPV test, you only need to test every 5 years, unless you have an abnormal result.     You do not need a Pap test if your uterus was removed (hysterectomy) and you have not had cancer.    You should be tested each year for STDs (sexually transmitted diseases) if you're at risk.     Have a mammogram every 1 to 2 years.    Have a colonoscopy at age 50, or have a yearly FIT test (stool test). These exams screen for colon cancer.      Have a cholesterol test every 5 years, or more often if advised.    Have a diabetes test (fasting glucose) every three years. If you are at risk for diabetes, you should have this test more often.     If you are at risk for osteoporosis (brittle bone disease), think about having a bone density scan (DEXA).    Shots: Get a flu shot each year. Get a tetanus shot every 10  years.    Nutrition:     Eat at least 5 servings of fruits and vegetables each day.    Eat whole-grain bread, whole-wheat pasta and brown rice instead of white grains and rice.    Get adequate Calcium and Vitamin D.     Lifestyle    Exercise at least 150 minutes a week (30 minutes a day, 5 days a week). This will help you control your weight and prevent disease.    Limit alcohol to one drink per day.    No smoking.     Wear sunscreen to prevent skin cancer.     See your dentist every six months for an exam and cleaning.    See your eye doctor every 1 to 2 years.            Follow-ups after your visit        Additional Services     DERMATOLOGY REFERRAL       Your provider has referred you to: [place] [facility] [phone]   [email or web address]    Please be aware that coverage of these services is subject to the terms and limitations of your health insurance plan.  Call member services at your health plan with any benefit or coverage questions.      Please bring the following with you to your appointment:    (1) Any X-Rays, CTs or MRIs which have been performed.  Contact the facility where they were done to arrange for  prior to your scheduled appointment.    (2) List of current medications  (3) This referral request   (4) Any documents/labs given to you for this referral                  Follow-up notes from your care team     Return in about 4 months (around 1/20/2019) for diabetes check, lab only 1 month.      Future tests that were ordered for you today     Open Future Orders        Priority Expected Expires Ordered    **A1C FUTURE anytime Routine 9/20/2018 9/20/2019 9/20/2018    MA Screening Digital Bilateral Routine  9/20/2019 9/20/2018            Who to contact     If you have questions or need follow up information about today's clinic visit or your schedule please contact [facility] directly at [phone].  Normal or non-critical lab and imaging  results will be communicated to you by Modriahart, letter or phone within 4 business days after the clinic has received the results. If you do not hear from us within 7 days, please contact the clinic through LetGive or phone. If you have a critical or abnormal lab result, we will notify you by phone as soon as possible.  Submit refill requests through LetGive or call your pharmacy and they will forward the refill request to us. Please allow 3 business days for your refill to be completed.          Additional Information About Your Visit        LetGive Information     LetGive gives you secure access to your electronic health record. If you see a primary care provider, you can also send messages to your care team and make appointments. If you have questions, please call your primary care clinic.  If you do not have a primary care provider, please call [phone] and they will assist you.        Care EveryWhere ID     This is your Care EveryWhere ID. This could be used by other organizations to access your [place] medical records  [id number]        Your Vitals Were     Pulse Temperature Respirations Last Period BMI (Body Mass Index)       88 98.2  F (36.8  C) (Oral) 16 10/03/2010 33.5 kg/m2        Blood Pressure from Last 3 Encounters:   09/20/18 136/88   07/17/18 128/88   03/20/18 108/76    Weight from Last 3 Encounters:   09/20/18 204 lb 6.4 oz (92.7 kg)   03/20/18 206 lb (93.4 kg)   03/09/18 206 lb (93.4 kg)              We Performed the Following     Albumin Random Urine Quantitative with Creat Ratio     CBC with platelets     DERMATOLOGY REFERRAL     FLU VACCINE, (RIV4) RECOMBINANT HA  , IM (FluBlok, egg free) [70055]- >18 YRS (FMG recommended  50-64 YRS)     Vaccine Administration, Initial [94609]          Today's Medication Changes          These changes are accurate as of 9/20/18  9:06 AM.  If you have any questions, ask your nurse or doctor.               These medicines have changed or have updated  prescriptions.        Dose/Directions    * ONETOUCH ULTRA test strip   This may have changed:  Another medication with the same name was added. Make sure you understand how and when to take each.   Used for:  Type II or unspecified type diabetes mellitus without mention of complication, not stated as uncontrolled   Generic drug:  blood glucose monitoring   Changed by:  Concha Lucas MD        by In Vitro route 3 times daily. Test as directed 3 times daily   Quantity:  300 strip   Refills:  12       * blood glucose monitoring test strip   Commonly known as:  TRUETEST   This may have changed:  Another medication with the same name was added. Make sure you understand how and when to take each.   Used for:  Type 2 diabetes, uncontrolled, with neuropathy (H)   Changed by:  Concha Lucas MD        by In Vitro route 3 times daily.   Quantity:  3 Box   Refills:  3       * blood glucose monitoring test strip   Commonly known as:  no brand specified   This may have changed:  You were already taking a medication with the same name, and this prescription was added. Make sure you understand how and when to take each.   Used for:  Routine general medical examination at a health care facility   Changed by:  Concha Lucas MD        Use to test blood sugars 1 times daily or as directed   Quantity:  100 strip   Refills:  3       * Notice:  This list has 3 medication(s) that are the same as other medications prescribed for you. Read the directions carefully, and ask your doctor or other care provider to review them with you.         Where to get your medicines      These medications were sent to [facility] - [address] [address]     Phone:  [phone]     atorvastatin 10 MG tablet    blood glucose monitoring test strip    buPROPion 150 MG 24 hr tablet    glipiZIDE 5 MG 24 hr tablet    lisinopril 5 MG tablet    metFORMIN 500 MG tablet     metroNIDAZOLE 0.75 % cream    topiramate 50 MG tablet                Primary Care Provider Office Phone # Fax #    Concha Kristin Lucas [phone]       [id number]  [address]   [facility] [id number]        Equal Access to Services     YARELI CRAIG : Hadii aad ku hadaddiso Soomaali, waaxda luqadaha, qaybta kaalmada adeegyada, waxay idiin hayisan adelorraine khjakobsh erica heredia. So [facility] [phone].    ATENCIÓN: Si habla español, tiene a langley disposición servicios gratuitos de asistencia lingüística. Llame al [phone].    We comply with applicable federal civil rights laws and [place] laws. We do not discriminate on the basis of race, color, national origin, age, disability, sex, sexual orientation, or gender identity.            Thank you!     Thank you for choosing [facility]  for your care. Our goal is always to provide you with excellent care. Hearing back from our patients is one way we can continue to improve our services. Please take a few minutes to complete the written survey that you may receive in the mail after your visit with us. Thank you!             Your Updated Medication List - Protect others around you: Learn how to safely use, store and throw away your medicines at www.disposemymeds.org.          This list is accurate as of 9/20/18  9:06 AM.  Always use your most recent med list.                   Brand Name Dispense Instructions for use Diagnosis    aspirin 325 MG EC tablet     100 tablet    Take 1 tablet (325 mg) by mouth daily    Routine general medical examination at a health care facility       atorvastatin 10 MG tablet    LIPITOR    90 tablet    Take 1 tablet (10 mg) by mouth daily    Hyperlipidemia LDL goal <100       buPROPion 150 MG 24 hr tablet    WELLBUTRIN XL    90 tablet    Take 1 tablet (150 mg) by mouth every morning    Major depressive disorder, recurrent episode, moderate (H)       cholecalciferol 5000 units Caps     90 capsule    Take 1 capsule by  mouth daily.    Vitamin D deficiency       cholestyramine light 4 GM powder    PREVALITE    231 g    1 LEVEL SCOOPFUL DAILY IN LIQUID    Hyperlipidemia LDL goal <100       ciclopirox 8 % Soln     19.8 mL    EXTERNALLY APPLY TOPICALLY DAILY    Onychomycosis       fluticasone 50 MCG/ACT spray    FLONASE    3 g    Spray 1-2 sprays into both nostrils daily    Chronic maxillary sinusitis       glipiZIDE 5 MG 24 hr tablet    GLUCOTROL XL    90 tablet    Take 3 tablets (15 mg) by mouth daily    Type 2 diabetes, uncontrolled, with neuropathy (H)       * lisinopril 10 MG tablet    PRINIVIL/ZESTRIL    90 tablet    Take 1 tablet (10 mg) by mouth daily    Type 2 diabetes, uncontrolled, with neuropathy (H)       * lisinopril 5 MG tablet    PRINIVIL/ZESTRIL    90 tablet    Take 1 tablet (5 mg) by mouth daily    Type 2 diabetes, uncontrolled, with neuropathy (H)       metFORMIN 500 MG tablet    GLUCOPHAGE    360 tablet    Take 2 tablets (1,000 mg) by mouth 2 times daily (with meals)    Type 2 diabetes, uncontrolled, with neuropathy (H)       metroNIDAZOLE 0.75 % cream    METROCREAM    45 g    Apply topically 2 times daily    Rosacea       * ONETOUCH ULTRA test strip   Generic drug:  blood glucose monitoring     300 strip    by In Vitro route 3 times daily. Test as directed 3 times daily    Type II or unspecified type diabetes mellitus without mention of complication, not stated as uncontrolled       * blood glucose monitoring test strip    TRUETEST    3 Box    by In Vitro route 3 times daily.    Type 2 diabetes, uncontrolled, with neuropathy (H)       * blood glucose monitoring test strip    no brand specified    100 strip    Use to test blood sugars 1 times daily or as directed    Routine general medical examination at a health care facility       order for DME     1 each    Orthotic shoes, pt with diabetes and peripheral neuropathy    Type 2 diabetes, HbA1C goal < 8% (H)       topiramate 50 MG tablet    TOPAMAX    180 tablet     Take 1 tablet (50 mg) by mouth 2 times daily    Morbid obesity due to excess calories (H)       TRUEPLUS LANCETS 33G Misc     3 Month    1 strip 3 times daily TRUETEST LANCETS to be used with the Trutest test strips.    Type 2 diabetes, uncontrolled, with neuropathy (H)       * Notice:  This list has 5 medication(s) that are the same as other medications prescribed for you. Read the directions carefully, and ask your doctor or other care provider to review them with you.

## 2018-09-20 NOTE — NURSING NOTE
Screening Questionnaire for Adult Immunization    Are you sick today?   No   Do you have allergies to medications, food, a vaccine component or latex?   No   Have you ever had a serious reaction after receiving a vaccination?   No   Do you have a long-term health problem with heart disease, lung disease, asthma, kidney disease, metabolic disease (e.g. diabetes), anemia, or other blood disorder?   No   Do you have cancer, leukemia, HIV/AIDS, or any other immune system problem?   No   In the past 3 months, have you taken medications that affect  your immune system, such as prednisone, other steroids, or anticancer drugs; drugs for the treatment of rheumatoid arthritis, Crohn s disease, or psoriasis; or have you had radiation treatments?   No   Have you had a seizure, or a brain or other nervous system problem?   No   During the past year, have you received a transfusion of blood or blood     products, or been given immune (gamma) globulin or antiviral drug?   No   For women: Are you pregnant or is there a chance you could become        pregnant during the next month?   No   Have you received any vaccinations in the past 4 weeks?   No     Immunization questionnaire answers were all negative.        Per orders of Dr. Lucas, injection of Shingrix given by Lucy Mann. Patient instructed to remain in clinic for 15 minutes afterwards, and to report any adverse reaction to me immediately.       Screening performed by Lucy Mann on 9/20/2018 at 11:07 AM.

## 2018-09-21 LAB
CREAT UR-MCNC: 177 MG/DL
MICROALBUMIN UR-MCNC: 13 MG/L
MICROALBUMIN/CREAT UR: 7.51 MG/G CR (ref 0–25)

## 2018-09-21 ASSESSMENT — ANXIETY QUESTIONNAIRES: GAD7 TOTAL SCORE: 9

## 2018-09-21 ASSESSMENT — PATIENT HEALTH QUESTIONNAIRE - PHQ9: SUM OF ALL RESPONSES TO PHQ QUESTIONS 1-9: 9

## 2018-10-05 DIAGNOSIS — E78.5 HYPERLIPIDEMIA LDL GOAL <100: ICD-10-CM

## 2018-10-05 RX ORDER — CHOLESTYRAMINE 4 G/5.5G
POWDER, FOR SUSPENSION ORAL
Qty: 231 G | Refills: 4 | Status: SHIPPED | OUTPATIENT
Start: 2018-10-05 | End: 2019-01-21

## 2018-10-05 NOTE — TELEPHONE ENCOUNTER
Requested Prescriptions   Pending Prescriptions Disp Refills     PREVALITE 4 GM/DOSE powder [Pharmacy Med Name: PREVALITE POWDER 4GM CAN] 231 g 4     Sig: USE 1 LEVEL SCOOPFUL DAILY IN LIQUID    There is no refill protocol information for this order        Last Written Prescription Date:  9/12/17  Last Fill Quantity: 231g  ,  # refills: 4   Last Office Visit: 9/20/2018   Future Office Visit:

## 2018-11-12 ENCOUNTER — TELEPHONE (OUTPATIENT)
Dept: FAMILY MEDICINE | Facility: CLINIC | Age: 58
End: 2018-11-12

## 2018-11-12 NOTE — TELEPHONE ENCOUNTER
Panel Management Review      Patient has the following on her problem list:     Depression / Dysthymia review    Measure:  Needs PHQ-9 score of 4 or less during index window.  Administer PHQ-9 and if score is 5 or more, send encounter to provider for next steps.    5 - 7 month window range: 03/20/19    PHQ-9 SCORE 12/15/2017 3/9/2018 9/20/2018   Total Score - - -   Total Score MyChart - 9 (Mild depression) -   Total Score 11 9 9       If PHQ-9 recheck is 5 or more, route to provider for next steps.    Patient is due for:  None    Diabetes    ASA: Passed    Last A1C  Lab Results   Component Value Date    A1C 8.7 07/17/2018    A1C 7.8 03/09/2018    A1C 8.5 12/15/2017    A1C 7.8 09/12/2017    A1C 7.4 02/27/2017     A1C tested: FAILED    Last LDL:    Lab Results   Component Value Date    CHOL 129 07/17/2018     Lab Results   Component Value Date    HDL 46 07/17/2018     Lab Results   Component Value Date    LDL 54 07/17/2018     Lab Results   Component Value Date    TRIG 144 07/17/2018     Lab Results   Component Value Date    CHOLHDLRATIO 3.1 08/24/2015     Lab Results   Component Value Date    NHDL 83 07/17/2018       Is the patient on a Statin? YES             Is the patient on Aspirin? YES    Medications     HMG CoA Reductase Inhibitors    atorvastatin (LIPITOR) 10 MG tablet    Salicylates    aspirin 325 MG EC tablet          Last three blood pressure readings:  BP Readings from Last 3 Encounters:   09/20/18 136/88   07/17/18 128/88   03/20/18 108/76       Date of last diabetes office visit: 09/20/18     Tobacco History:     History   Smoking Status     Former Smoker     Packs/day: 1.00     Years: 5.00     Types: Cigarettes     Quit date: 1/4/1985   Smokeless Tobacco     Never Used     Comment: quit in 1985 smoked one pack a day         Hypertension   Last three blood pressure readings:  BP Readings from Last 3 Encounters:   09/20/18 136/88   07/17/18 128/88   03/20/18 108/76     Blood pressure: Passed    HTN  Guidelines:  Age 18-59 BP range:  Less than 140/90  Age 60-85 with Diabetes:  Less than 140/90  Age 60-85 without Diabetes:  less than 150/90      Composite cancer screening  Chart review shows that this patient is due/due soon for the following None  Summary:    Patient is due/failing the following:   A1C    Action needed:   Patient needs non-fasting lab only appointment    Type of outreach:    Sent [employer] message.    Questions for provider review:    None                                                                                                                                    Lisa Magill, CMA       Chart routed to Care Team .

## 2018-11-12 NOTE — LETTER
[address], [address]  [facility] [id number] [phone]  November 12, 2018    Susan Dietrich  [address] [place] [id number]      Dear Susan,    I care about your health and have reviewed your health plan.  I have reviewed your medical conditions, medication list, and lab results and am making recommendations  based on this review, to better manage your health.    You are particularly in need of attention regarding:  -Diabetes    I am recommending that you:  -schedule a LAB ONLY APPOINTMENT to recheck your: A1c test within the next 1-4 weeks.    Here is a list of Health Maintenance topics that are due now or due soon:  Health Maintenance Due   Topic Date Due     A1C Q3 MO  10/17/2018       Please call us at [phone] (or use [employer]) to address the above   recommendations.    Thank you for trusting [facility] and we appreciate the opportunity to serve you.  We look forward to supporting your healthcare needs in the future.    Healthy Regards,    Concha Lucas MD/dallin

## 2018-11-21 ENCOUNTER — ALLIED HEALTH/NURSE VISIT (OUTPATIENT)
Dept: NURSING | Facility: CLINIC | Age: 58
End: 2018-11-21
Payer: COMMERCIAL

## 2018-11-21 DIAGNOSIS — Z23 ENCOUNTER FOR IMMUNIZATION: Primary | ICD-10-CM

## 2018-11-21 LAB — HBA1C MFR BLD: 9.4 % (ref 0–5.6)

## 2018-11-21 PROCEDURE — 83036 HEMOGLOBIN GLYCOSYLATED A1C: CPT | Performed by: FAMILY MEDICINE

## 2018-11-21 PROCEDURE — 90471 IMMUNIZATION ADMIN: CPT

## 2018-11-21 PROCEDURE — 90750 HZV VACC RECOMBINANT IM: CPT

## 2018-11-21 PROCEDURE — 36415 COLL VENOUS BLD VENIPUNCTURE: CPT | Performed by: FAMILY MEDICINE

## 2018-11-21 NOTE — TELEPHONE ENCOUNTER
Assisted patient in making a LAB ONLY appointment for a A1C test and also a NURSE ONLY appointment to get her 2nd SHINGRIX injection.  Lisa Magill, CMA

## 2018-11-27 NOTE — TELEPHONE ENCOUNTER
Pt just had her a1c test done 11/21. No need to recheck it again this soon.  She should have a Follow up appt in 2-3 months, for recheck, which she does in January.  Please let pt know she can get her a1c test, just before her appt     Thank you

## 2018-11-28 NOTE — TELEPHONE ENCOUNTER
Hemoglobin A1C   Date Value Ref Range Status   11/21/2018 9.4 (H) 0 - 5.6 % Final     Comment:     Normal <5.7% Prediabetes 5.7-6.4%  Diabetes 6.5% or higher - adopted from ADA   consensus guidelines.  Results confirmed by repeat test       Terry Burnett   11/28/18 10:00 AM

## 2018-12-20 NOTE — TELEPHONE ENCOUNTER
Requested Prescriptions   Pending Prescriptions Disp Refills     glipiZIDE (GLUCOTROL XL) 5 MG 24 hr tablet [Pharmacy Med Name: GLIPIZIDE ER 5MG TABLET] 90 tablet 1    Last Written Prescription Date:  9/20/18  Last Fill Quantity: 90,  # refills: 1   Last Office Visit: 9/20/2018 Lance      Return in about 4 months (around 1/20/2019) for diabetes check, lab only 1 month.     Future Office Visit:    Next 5 appointments (look out 90 days)    Jan 21, 2019  8:00 AM CST  SHORT with Concha Lucas MD  [facility] ([facility] [id number] [place] [address], [address]  [facility] [address] [phone]          Sig: TAKE 3 TABLETS BY MOUTH DAILY    Sulfonylurea Agents Passed - 12/20/2018 10:05 AM       Passed - Blood pressure less than 140/90 in past 6 months    BP Readings from Last 3 Encounters:   09/20/18 136/88   07/17/18 128/88   03/20/18 108/76                Passed - Patient has documented LDL within the past 12 mos.    Recent Labs   Lab Test 07/17/18  0956   LDL 54            Passed - Patient has had a Microalbumin in the past 12 mos.    Recent Labs   Lab Test 09/20/18  0930   MICROL 13   UMALCR 7.51            Passed - Patient has documented A1c within the specified period of time.    If HgbA1C is 8 or greater, it needs to be on file within the past 3 months.  If less than 8, must be on file within the past 6 months.     Recent Labs   Lab Test 11/21/18  0831   A1C 9.4*            Passed - Patient is age 18 or older       Passed - No active pregnancy on record       Passed - Patient has a recent creatinine (normal) within the past 12 mos.    Recent Labs   Lab Test 07/17/18  0956   CR 0.72            Passed - Patient has not had a positive pregnancy test within the past 12 mos.       Passed - Recent (6 mo) or future (30 days) visit within the authorizing provider's specialty    Patient had office visit in the last 6 months or has a visit in the next 30 days with authorizing  "provider or within the authorizing provider's specialty.  See \"Patient Info\" tab in inbasket, or \"Choose Columns\" in Meds & Orders section of the refill encounter.            "

## 2018-12-21 RX ORDER — GLIPIZIDE 5 MG/1
TABLET, FILM COATED, EXTENDED RELEASE ORAL
Qty: 90 TABLET | Refills: 1 | Status: SHIPPED | OUTPATIENT
Start: 2018-12-21 | End: 2019-01-21

## 2019-01-21 ENCOUNTER — OFFICE VISIT (OUTPATIENT)
Dept: FAMILY MEDICINE | Facility: CLINIC | Age: 59
End: 2019-01-21
Payer: COMMERCIAL

## 2019-01-21 VITALS
DIASTOLIC BLOOD PRESSURE: 84 MMHG | RESPIRATION RATE: 16 BRPM | SYSTOLIC BLOOD PRESSURE: 122 MMHG | WEIGHT: 203 LBS | HEART RATE: 88 BPM | TEMPERATURE: 98.2 F | HEIGHT: 65 IN | BODY MASS INDEX: 33.82 KG/M2 | OXYGEN SATURATION: 96 %

## 2019-01-21 DIAGNOSIS — Z12.11 SCREEN FOR COLON CANCER: ICD-10-CM

## 2019-01-21 DIAGNOSIS — B35.1 ONYCHOMYCOSIS: ICD-10-CM

## 2019-01-21 DIAGNOSIS — E66.01 MORBID OBESITY DUE TO EXCESS CALORIES (H): Chronic | ICD-10-CM

## 2019-01-21 DIAGNOSIS — F33.1 MAJOR DEPRESSIVE DISORDER, RECURRENT EPISODE, MODERATE (H): ICD-10-CM

## 2019-01-21 DIAGNOSIS — E78.5 HYPERLIPIDEMIA LDL GOAL <100: ICD-10-CM

## 2019-01-21 LAB — HBA1C MFR BLD: 9.6 % (ref 0–5.6)

## 2019-01-21 PROCEDURE — 99214 OFFICE O/P EST MOD 30 MIN: CPT | Performed by: FAMILY MEDICINE

## 2019-01-21 PROCEDURE — 36415 COLL VENOUS BLD VENIPUNCTURE: CPT | Performed by: FAMILY MEDICINE

## 2019-01-21 PROCEDURE — 80061 LIPID PANEL: CPT | Performed by: FAMILY MEDICINE

## 2019-01-21 PROCEDURE — 83036 HEMOGLOBIN GLYCOSYLATED A1C: CPT | Performed by: FAMILY MEDICINE

## 2019-01-21 RX ORDER — LISINOPRIL 5 MG/1
5 TABLET ORAL DAILY
Qty: 90 TABLET | Refills: 1 | Status: SHIPPED | OUTPATIENT
Start: 2019-01-21 | End: 2019-06-22

## 2019-01-21 RX ORDER — TOPIRAMATE 50 MG/1
50 TABLET, FILM COATED ORAL 2 TIMES DAILY
Qty: 180 TABLET | Refills: 1 | Status: SHIPPED | OUTPATIENT
Start: 2019-01-21 | End: 2019-06-22

## 2019-01-21 RX ORDER — GLIPIZIDE 5 MG/1
TABLET, FILM COATED, EXTENDED RELEASE ORAL
Qty: 90 TABLET | Refills: 1 | Status: SHIPPED | OUTPATIENT
Start: 2019-01-21 | End: 2019-03-29

## 2019-01-21 RX ORDER — BUPROPION HYDROCHLORIDE 150 MG/1
150 TABLET ORAL EVERY MORNING
Qty: 90 TABLET | Refills: 1 | Status: SHIPPED | OUTPATIENT
Start: 2019-01-21 | End: 2019-06-22

## 2019-01-21 RX ORDER — CHOLESTYRAMINE 4 G/5.5G
4 POWDER, FOR SUSPENSION ORAL DAILY
Qty: 231 G | Refills: 4 | Status: SHIPPED | OUTPATIENT
Start: 2019-01-21 | End: 2020-04-17

## 2019-01-21 RX ORDER — CICLOPIROX 80 MG/ML
SOLUTION TOPICAL
Qty: 19.8 ML | Refills: 1 | Status: SHIPPED | OUTPATIENT
Start: 2019-01-21 | End: 2020-08-24

## 2019-01-21 RX ORDER — ATORVASTATIN CALCIUM 10 MG/1
10 TABLET, FILM COATED ORAL DAILY
Qty: 90 TABLET | Refills: 3 | Status: SHIPPED | OUTPATIENT
Start: 2019-01-21 | End: 2019-06-22

## 2019-01-21 ASSESSMENT — MIFFLIN-ST. JEOR: SCORE: 1510.64

## 2019-01-21 NOTE — NURSING NOTE
"Chief Complaint   Patient presents with     Diabetes     Blood Draw     LABS.  Patient is fasting     Initial /84 (BP Location: Right arm, Patient Position: Sitting, Cuff Size: Adult Large)   Pulse 88   Temp 98.2  F (36.8  C) (Oral)   Resp 16   Ht 1.657 m (5' 5.25\")   Wt 92.1 kg (203 lb)   LMP 10/03/2010   SpO2 96%   BMI 33.52 kg/m   Estimated body mass index is 33.52 kg/m  as calculated from the following:    Height as of this encounter: 1.657 m (5' 5.25\").    Weight as of this encounter: 92.1 kg (203 lb).  BP completed using cuff size large right arm    Lisa Magill, CMA    "

## 2019-01-21 NOTE — PROGRESS NOTES
3  SUBJECTIVE:   Susan Dietrich is a 58 year old female who presents to clinic today for the following health issues:      Diabetes Follow-up    Patient is checking blood sugars: once daily.  Results are as follows:         am - 106-130    Diabetic concerns: other - patient does not want to go on insulin.      Symptoms of hypoglycemia (low blood sugar): none     Paresthesias (numbness or burning in feet) or sores: No     Date of last diabetic eye exam: 04/17/18-copy in patient's chart     Hemoglobin A1C (%)   Date Value   01/21/2019 9.6 (H)   11/21/2018 9.4 (H)     LDL Cholesterol Calculated (mg/dL)   Date Value   07/17/2018 54   03/09/2018 42       Diabetes Management Resources      Amount of exercise or physical activity: 2-3 days/week for an average of less than 15 minutes    Problems taking medications regularly: cholestyramine-icky taste.  Patient is wondering about getting prevalite again for her BM's. Has questions about taking magnesium? 122/84      Medication side effects: low back muscle aches    Diet: low salt    Wt Readings from Last 3 Encounters:   01/21/19 92.1 kg (203 lb)   09/20/18 92.7 kg (204 lb 6.4 oz)   03/20/18 93.4 kg (206 lb)     A1c increased. Patient admits that at that time she was not exercising. Noted that patient has been losing weight. She is taking glipizide 15 mg daily and metformin 1,000 mg twice daily. AM fasting glucose levels range 106-130 mg/dL. She drinks only water and milk. Notes that she drinks only skim milk only at supper.     Depression    PHQ-9 SCORE 12/15/2017 3/9/2018 9/20/2018   PHQ-9 Total Score - - -   PHQ-9 Total Score MyChart - 9 (Mild depression) -   PHQ-9 Total Score 11 9 9     Currently on Wellbutrin 150 mg daily. Patient relates that she was very stressed over the holidays due to family matters and family stress.     Dumping syndrome  Patient states that her insurance does not cover brand name Prevalite and the off brand one she has to mix tastes bad. This  may affect her morning routine which may result in her not taking her morning medications.     Toe nail fungus  She has been applying ciclopirox once weekly. Notes that it was almost cleared but is now returning. Denies any surrounding skin irritation.     Hypertension    BP Readings from Last 3 Encounters:   01/21/19 122/84   09/20/18 136/88   07/17/18 128/88     BP controlled with lisinopril 5 mg and 10 mg daily.     Back pain  She reports a lot of low back pains. Worse when bending. She is not interested in PT. She also notes some muscle aches in her arms, unsure if it is related to her frequent knitting.     Hyperlipidemia    Recent Labs   Lab Test 07/17/18  0956 03/09/18  0921  08/24/15  0902  11/21/14  0808   CHOL 129 116   < > 138  --  126   HDL 46* 47*   < > 44*  --  54   LDL 54 42   < > 69   < > 50   TRIG 144 135   < > 123  --  111   CHOLHDLRATIO  --   --   --  3.1  --  2.3    < > = values in this interval not displayed.     Taking atorvastatin 10 mg daily.     Other problems to add on...  -Notes that the mole on her left breast has grown in size and she wishes to have it inspected. She also has a skin lesion on her back that she scratched and started bleeding.   -Noted that past records indicated that there was evidence of small vessel changes. She denies any pains in her legs when walking.    Problem list and histories reviewed & adjusted, as indicated.  Additional history: as documented    Recent Labs   Lab Test 01/21/19  0850 11/21/18  0831 07/17/18  0956 03/09/18  0921 12/15/17  0845 09/12/17  0851   A1C 9.6* 9.4* 8.7* 7.8* 8.5* 7.8*   LDL  --   --  54 42  --  46   HDL  --   --  46* 47*  --  47*   TRIG  --   --  144 135  --  132   ALT  --   --  46  --  38 36   CR  --   --  0.72  --  0.73 0.73   GFRESTIMATED  --   --  84  --  82 83   GFRESTBLACK  --   --  >90  --  >90 >90   POTASSIUM  --   --  4.1  --  4.1 4.1   TSH  --   --  1.34  --  1.41  --       BP Readings from Last 3 Encounters:   01/21/19 122/84  "  09/20/18 136/88   07/17/18 128/88    Wt Readings from Last 3 Encounters:   01/21/19 92.1 kg (203 lb)   09/20/18 92.7 kg (204 lb 6.4 oz)   03/20/18 93.4 kg (206 lb)          Labs reviewed in EPIC    Reviewed and updated as needed this visit by clinical staff  Tobacco  Allergies  Meds  Problems  Med Hx  Surg Hx  Fam Hx  Soc Hx        Reviewed and updated as needed this visit by Provider  Meds  Problems         ROS:  Constitutional, HEENT, cardiovascular, pulmonary, gi and gu systems are negative, except as otherwise noted.    This document serves as a record of the services and decisions personally performed and made by Concha Lucas MD. It was created on her behalf by Nevaeh Duran, a trained medical scribe. The creation of this document is based on the provider's statements to the medical scribe.  Nevaeh Duran January 21, 2019 8:15 AM     OBJECTIVE:     /84 (BP Location: Right arm, Patient Position: Sitting, Cuff Size: Adult Large)   Pulse 88   Temp 98.2  F (36.8  C) (Oral)   Resp 16   Ht 1.657 m (5' 5.25\")   Wt 92.1 kg (203 lb)   LMP 10/03/2010   SpO2 96%   BMI 33.52 kg/m    Body mass index is 33.52 kg/m .     GENERAL: healthy, alert and no distress  RESP: lungs clear to auscultation - no rales, rhonchi or wheezes  CV: regular rate and rhythm, normal S1 S2, no S3 or S4, no murmur, click or rub, no peripheral edema and peripheral pulses strong  MS: no gross musculoskeletal defects noted, no edema  SKIN: no suspicious lesions or rashes. Tinea pedis noted on feet bilaterally, fungal toenail, big toe, also Skin lesion on left breast, 0.5 cm round, raised. Skin lesion on back appears benign, suspect SKs  NEURO: Normal strength and tone, mentation intact and speech normal  PSYCH: mentation appears normal, affect normal/bright  Diabetic foot exam: normal DP and PT pulses, no trophic changes or ulcerative lesions, normal sensory exam and normal monofilament exam    Diagnostic Test " Results:  Results for orders placed or performed in visit on 01/21/19 (from the past 24 hour(s))   Hemoglobin A1c   Result Value Ref Range    Hemoglobin A1C 9.6 (H) 0 - 5.6 %       ASSESSMENT/PLAN:   (E11.40,  E11.65) Type 2 diabetes, uncontrolled, with neuropathy  (primary encounter diagnosis)  Comment: Checking A1c today. Refilled rx, continue current meds. Recommended patient sporadically check glucose levels in the evenings in addition to the mornings. Continue working on improving diet and activity levels.   Plan: glipiZIDE (GLUCOTROL XL) 5 MG 24 hr tablet,         lisinopril (PRINIVIL/ZESTRIL) 5 MG tablet,         metFORMIN (GLUCOPHAGE) 500 MG tablet, Lipid         panel reflex to direct LDL Fasting, Hemoglobin         A1c          (F33.1) Major depressive disorder, recurrent episode, moderate (H)  Comment: Overall stable. Refilled rx, continue wellbutrin.   Plan: buPROPion (WELLBUTRIN XL) 150 MG 24 hr tablet          (E78.5) Hyperlipidemia LDL goal <100  Comment: Checking LDL today.  Patient does not like the taste of the off brand Prevalite and has not been taking it as often because of it. Will see if brand name is covered by insurance.    Plan: atorvastatin (LIPITOR) 10 MG tablet, PREVALITE         4 GM/DOSE powder          (E66.01) obesity due to excess calories (H)  Comment: Noted that patient is losing weight. Refilled rx. Continue working on diet and exercise regime.   Plan: topiramate (TOPAMAX) 50 MG tablet          (Z12.11) Screen for colon cancer  Comment: Ordered today  Plan: Fecal colorectal cancer screen (FIT)          (B35.1) Onychomycosis  Comment: Refilled rx. Advised patient that she must apply it daily then remove it once a week.   Plan: ciclopirox (PENLAC) 8 % external solution    Skin lesion: Patient will follow up for a mole biopsy.           Follow up in 1 month    The information in this document, created by the medical scribe for me, accurately reflects the services I personally  performed and the decisions made by me. I have reviewed and approved this document for accuracy prior to leaving the patient care area.  January 21, 2019 8:39 AM    Concha Lucas MD  [facility]

## 2019-01-22 LAB
CHOLEST SERPL-MCNC: 134 MG/DL
HDLC SERPL-MCNC: 46 MG/DL
LDLC SERPL CALC-MCNC: 59 MG/DL
NONHDLC SERPL-MCNC: 88 MG/DL
TRIGL SERPL-MCNC: 145 MG/DL

## 2019-04-03 ENCOUNTER — TELEPHONE (OUTPATIENT)
Dept: FAMILY MEDICINE | Facility: CLINIC | Age: 59
End: 2019-04-03

## 2019-04-03 NOTE — TELEPHONE ENCOUNTER
Panel Management Review      Patient has the following on her problem list:     Depression / Dysthymia review    Measure:  Needs PHQ-9 score of 4 or less during index window.  Administer PHQ-9 and if score is 5 or more, send encounter to provider for next steps.    5   7 month window range: PHQ-9 due NOW    PHQ-9 SCORE 12/15/2017 3/9/2018 9/20/2018   PHQ-9 Total Score - - -   PHQ-9 Total Score MyChart - 9 (Mild depression) -   PHQ-9 Total Score 11 9 9       If PHQ-9 recheck is 5 or more, route to provider for next steps.    Patient is due for:  PHQ9    Diabetes    ASA: Passed    Last A1C  Lab Results   Component Value Date    A1C 9.6 01/21/2019    A1C 9.4 11/21/2018    A1C 8.7 07/17/2018    A1C 7.8 03/09/2018    A1C 8.5 12/15/2017     A1C tested: FAILED    Last LDL:    Lab Results   Component Value Date    CHOL 134 01/21/2019     Lab Results   Component Value Date    HDL 46 01/21/2019     Lab Results   Component Value Date    LDL 59 01/21/2019     Lab Results   Component Value Date    TRIG 145 01/21/2019     Lab Results   Component Value Date    CHOLHDLRATIO 3.1 08/24/2015     Lab Results   Component Value Date    NHDL 88 01/21/2019       Is the patient on a Statin? YES             Is the patient on Aspirin? YES    Medications     HMG CoA Reductase Inhibitors     atorvastatin (LIPITOR) 10 MG tablet       Salicylates     aspirin 325 MG EC tablet             Last three blood pressure readings:  BP Readings from Last 3 Encounters:   01/21/19 122/84   09/20/18 136/88   07/17/18 128/88       Date of last diabetes office visit: 01/21/19     Tobacco History:     History   Smoking Status     Former Smoker     Packs/day: 1.00     Years: 5.00     Types: Cigarettes     Quit date: 1/4/1985   Smokeless Tobacco     Never Used     Comment: quit in 1985 smoked one pack a day         Hypertension   Last three blood pressure readings:  BP Readings from Last 3 Encounters:   01/21/19 122/84   09/20/18 136/88   07/17/18 128/88      Blood pressure: Passed    HTN Guidelines:  Age 18-59 BP range:  Less than 140/90  Age 60-85 with Diabetes:  Less than 140/90  Age 60-85 without Diabetes:  less than 150/90      Composite cancer screening  Chart review shows that this patient is due/due soon for the following Mammogram and Fecal Colorectal (FIT)  Summary:    Patient is due/failing the following:   ALT, A1C, FOLLOW UP, FIT, MAMMOGRAM and PHQ9    Action needed:   Patient needs office visit for DIABETES MANAGEMENT.    Type of outreach:    Sent [employer] message.    Questions for provider review:    None                                                                                                                                    Lisa Magill, CMA       Chart routed to Care Team .

## 2019-04-10 NOTE — TELEPHONE ENCOUNTER
Patient viewed the MY CHART message regarding the health maintenance that is due.     Will send the PHQ-9 and VINITA-7 questionnaires through MY CHART for the patient to complete.  Lisa Magill, CMA

## 2019-04-17 ASSESSMENT — ANXIETY QUESTIONNAIRES
7. FEELING AFRAID AS IF SOMETHING AWFUL MIGHT HAPPEN: NOT AT ALL
2. NOT BEING ABLE TO STOP OR CONTROL WORRYING: NOT AT ALL
5. BEING SO RESTLESS THAT IT IS HARD TO SIT STILL: NOT AT ALL
6. BECOMING EASILY ANNOYED OR IRRITABLE: SEVERAL DAYS
IF YOU CHECKED OFF ANY PROBLEMS ON THIS QUESTIONNAIRE, HOW DIFFICULT HAVE THESE PROBLEMS MADE IT FOR YOU TO DO YOUR WORK, TAKE CARE OF THINGS AT HOME, OR GET ALONG WITH OTHER PEOPLE: NOT DIFFICULT AT ALL
GAD7 TOTAL SCORE: 2
3. WORRYING TOO MUCH ABOUT DIFFERENT THINGS: SEVERAL DAYS
1. FEELING NERVOUS, ANXIOUS, OR ON EDGE: NOT AT ALL

## 2019-04-17 ASSESSMENT — PATIENT HEALTH QUESTIONNAIRE - PHQ9: 5. POOR APPETITE OR OVEREATING: NOT AT ALL

## 2019-04-17 NOTE — TELEPHONE ENCOUNTER
PHQ-9 and VINITA-7 questions completed over the phone.  VINITA-7 SCORE 12/15/2017 9/20/2018 4/17/2019   Total Score - - -   Total Score 9 9 2     PHQ-9 SCORE 12/15/2017 3/9/2018 9/20/2018   PHQ-9 Total Score - - -   PHQ-9 Total Score MyChart - 9 (Mild depression) -   PHQ-9 Total Score 11 9 9     Assisted patient in scheduling a diabetes management appointment for 06/07/19.     Lisa Magill, CMA

## 2019-04-18 ASSESSMENT — ANXIETY QUESTIONNAIRES: GAD7 TOTAL SCORE: 2

## 2019-04-29 NOTE — TELEPHONE ENCOUNTER
Requested Prescriptions   Pending Prescriptions Disp Refills     glipiZIDE (GLUCOTROL XL) 5 MG 24 hr tablet [Pharmacy Med Name: GLIPIZIDE ER 5MG] 90 tablet 0     Sig: TAKE 3 TABLETS BY MOUTH DAILY   Last Written Prescription Date:  3/29/19  Last Fill Quantity: 90,  # refills: 0   Last Office Visit: 1/21/2019 Lance      Return in about 1 month (around 2/21/2019), or mole bx.     Future Office Visit:    Next 5 appointments (look out 90 days)    Jun 07, 2019 10:40 AM CDT  SHORT with Concha Lucas MD  [facility] [address] [place] [address], [address]  [facility] [address] [phone]             Sulfonylurea Agents Failed - 4/29/2019 10:27 AM        Failed - Patient has documented A1c within the specified period of time.     If HgbA1C is 8 or greater, it needs to be on file within the past 3 months.  If less than 8, must be on file within the past 6 months.     Recent Labs   Lab Test 01/21/19  0850   A1C 9.6*             Passed - Blood pressure less than 140/90 in past 6 months     BP Readings from Last 3 Encounters:   01/21/19 122/84   09/20/18 136/88   07/17/18 128/88                 Passed - Patient has documented LDL within the past 12 mos.     Recent Labs   Lab Test 01/21/19  0850   LDL 59             Passed - Patient has had a Microalbumin in the past 12 mos.     Recent Labs   Lab Test 09/20/18  0930   MICROL 13   UMALCR 7.51             Passed - Medication is active on med list        Passed - Patient is age 18 or older        Passed - No active pregnancy on record        Passed - Patient has a recent creatinine (normal) within the past 12 mos.     Recent Labs   Lab Test 07/17/18  0956   CR 0.72             Passed - Patient has not had a positive pregnancy test within the past 12 mos.        Passed - Recent (6 mo) or future (30 days) visit within the authorizing provider's specialty     Patient had office visit in the last 6 months or has a visit in the  "next 30 days with authorizing provider or within the authorizing provider's specialty.  See \"Patient Info\" tab in inbasket, or \"Choose Columns\" in Meds & Orders section of the refill encounter.            "

## 2019-04-30 RX ORDER — GLIPIZIDE 5 MG/1
TABLET, FILM COATED, EXTENDED RELEASE ORAL
Qty: 90 TABLET | Refills: 0 | Status: SHIPPED | OUTPATIENT
Start: 2019-04-30 | End: 2019-06-03

## 2019-06-04 NOTE — TELEPHONE ENCOUNTER
"Requested Prescriptions   Pending Prescriptions Disp Refills     glipiZIDE (GLIPIZIDE XL) 5 MG 24 hr tablet [Pharmacy Med Name: *GLIPIZIDE XL 5MG] 90 tablet 0     Sig: TAKE 3 TABLETS BY MOUTH DAILY   Last Written Prescription Date:  4/30/19  Last Fill Quantity: 90,  # refills: 0   Last Office Visit: 1/21/2019 Lance      Return in about 1 month (around 2/21/2019), or mole bx.     Future Office Visit:         Sulfonylurea Agents Failed - 6/3/2019 10:46 AM        Failed - Patient has documented A1c within the specified period of time.     If HgbA1C is 8 or greater, it needs to be on file within the past 3 months.  If less than 8, must be on file within the past 6 months.     Recent Labs   Lab Test 01/21/19  0850   A1C 9.6*             Passed - Blood pressure less than 140/90 in past 6 months     BP Readings from Last 3 Encounters:   01/21/19 122/84   09/20/18 136/88   07/17/18 128/88                 Passed - Patient has documented LDL within the past 12 mos.     Recent Labs   Lab Test 01/21/19  0850   LDL 59             Passed - Patient has had a Microalbumin in the past 15 mos.     Recent Labs   Lab Test 09/20/18  0930   MICROL 13   UMALCR 7.51             Passed - Medication is active on med list        Passed - Patient is age 18 or older        Passed - No active pregnancy on record        Passed - Patient has a recent creatinine (normal) within the past 12 mos.     Recent Labs   Lab Test 07/17/18  0956   CR 0.72             Passed - Patient has not had a positive pregnancy test within the past 12 mos.        Passed - Recent (6 mo) or future (30 days) visit within the authorizing provider's specialty     Patient had office visit in the last 6 months or has a visit in the next 30 days with authorizing provider or within the authorizing provider's specialty.  See \"Patient Info\" tab in inbasket, or \"Choose Columns\" in Meds & Orders section of the refill encounter.            "

## 2019-06-06 NOTE — TELEPHONE ENCOUNTER
Routing refill request to provider for review/approval because:  Sarahi given x1 and patient did not follow up, please advise    Do you wan to give another 90 day or 30 day?    Pt is not scheduled now until 6/22/19    Jl Dey RN, BSN

## 2019-06-10 RX ORDER — GLIPIZIDE 5 MG/1
TABLET, FILM COATED, EXTENDED RELEASE ORAL
Qty: 90 TABLET | Refills: 0 | Status: SHIPPED | OUTPATIENT
Start: 2019-06-10 | End: 2019-06-22

## 2019-06-22 ENCOUNTER — OFFICE VISIT (OUTPATIENT)
Dept: FAMILY MEDICINE | Facility: CLINIC | Age: 59
End: 2019-06-22
Payer: COMMERCIAL

## 2019-06-22 VITALS
WEIGHT: 206 LBS | DIASTOLIC BLOOD PRESSURE: 81 MMHG | RESPIRATION RATE: 16 BRPM | HEART RATE: 86 BPM | HEIGHT: 65 IN | TEMPERATURE: 98.2 F | BODY MASS INDEX: 34.32 KG/M2 | SYSTOLIC BLOOD PRESSURE: 121 MMHG

## 2019-06-22 DIAGNOSIS — Z12.11 SPECIAL SCREENING FOR MALIGNANT NEOPLASMS, COLON: ICD-10-CM

## 2019-06-22 DIAGNOSIS — F33.1 MAJOR DEPRESSIVE DISORDER, RECURRENT EPISODE, MODERATE (H): ICD-10-CM

## 2019-06-22 DIAGNOSIS — E66.01 MORBID OBESITY DUE TO EXCESS CALORIES (H): Chronic | ICD-10-CM

## 2019-06-22 DIAGNOSIS — E78.5 HYPERLIPIDEMIA LDL GOAL <100: ICD-10-CM

## 2019-06-22 LAB
ALBUMIN SERPL-MCNC: 3.7 G/DL (ref 3.4–5)
ALP SERPL-CCNC: 89 U/L (ref 40–150)
ALT SERPL W P-5'-P-CCNC: 38 U/L (ref 0–50)
ANION GAP SERPL CALCULATED.3IONS-SCNC: 12 MMOL/L (ref 3–14)
AST SERPL W P-5'-P-CCNC: 20 U/L (ref 0–45)
BILIRUB SERPL-MCNC: 0.6 MG/DL (ref 0.2–1.3)
BUN SERPL-MCNC: 15 MG/DL (ref 7–30)
CALCIUM SERPL-MCNC: 9 MG/DL (ref 8.5–10.1)
CHLORIDE SERPL-SCNC: 111 MMOL/L (ref 94–109)
CO2 SERPL-SCNC: 20 MMOL/L (ref 20–32)
CREAT SERPL-MCNC: 0.65 MG/DL (ref 0.52–1.04)
GFR SERPL CREATININE-BSD FRML MDRD: >90 ML/MIN/{1.73_M2}
GLUCOSE SERPL-MCNC: 194 MG/DL (ref 70–99)
HBA1C MFR BLD: 9.9 % (ref 0–5.6)
POTASSIUM SERPL-SCNC: 4.1 MMOL/L (ref 3.4–5.3)
PROT SERPL-MCNC: 7.5 G/DL (ref 6.8–8.8)
SODIUM SERPL-SCNC: 143 MMOL/L (ref 133–144)

## 2019-06-22 PROCEDURE — 80053 COMPREHEN METABOLIC PANEL: CPT | Performed by: FAMILY MEDICINE

## 2019-06-22 PROCEDURE — 83036 HEMOGLOBIN GLYCOSYLATED A1C: CPT | Performed by: FAMILY MEDICINE

## 2019-06-22 PROCEDURE — 99214 OFFICE O/P EST MOD 30 MIN: CPT | Performed by: FAMILY MEDICINE

## 2019-06-22 PROCEDURE — 36415 COLL VENOUS BLD VENIPUNCTURE: CPT | Performed by: FAMILY MEDICINE

## 2019-06-22 RX ORDER — LISINOPRIL 5 MG/1
5 TABLET ORAL DAILY
Qty: 90 TABLET | Refills: 1 | Status: SHIPPED | OUTPATIENT
Start: 2019-06-22 | End: 2019-12-23

## 2019-06-22 RX ORDER — HYDROCORTISONE 2.5 %
CREAM (GRAM) TOPICAL
COMMUNITY
Start: 2018-12-05 | End: 2022-10-12

## 2019-06-22 RX ORDER — ATORVASTATIN CALCIUM 10 MG/1
10 TABLET, FILM COATED ORAL DAILY
Qty: 90 TABLET | Refills: 3 | Status: SHIPPED | OUTPATIENT
Start: 2019-06-22 | End: 2020-06-16

## 2019-06-22 RX ORDER — GLIPIZIDE 5 MG/1
TABLET, FILM COATED, EXTENDED RELEASE ORAL
Qty: 90 TABLET | Refills: 0 | Status: SHIPPED | OUTPATIENT
Start: 2019-06-22 | End: 2019-07-29

## 2019-06-22 RX ORDER — BUPROPION HYDROCHLORIDE 150 MG/1
150 TABLET ORAL EVERY MORNING
Qty: 90 TABLET | Refills: 1 | Status: SHIPPED | OUTPATIENT
Start: 2019-06-22 | End: 2020-01-24

## 2019-06-22 RX ORDER — BETAMETHASONE DIPROPIONATE 0.5 MG/G
CREAM TOPICAL
Refills: 2 | COMMUNITY
Start: 2019-05-30 | End: 2022-01-06

## 2019-06-22 RX ORDER — TOPIRAMATE 100 MG/1
100 TABLET, FILM COATED ORAL 2 TIMES DAILY
Qty: 60 TABLET | Refills: 5 | Status: SHIPPED | OUTPATIENT
Start: 2019-06-22 | End: 2020-01-07

## 2019-06-22 ASSESSMENT — MIFFLIN-ST. JEOR: SCORE: 1515.29

## 2019-06-22 ASSESSMENT — ANXIETY QUESTIONNAIRES
3. WORRYING TOO MUCH ABOUT DIFFERENT THINGS: SEVERAL DAYS
1. FEELING NERVOUS, ANXIOUS, OR ON EDGE: MORE THAN HALF THE DAYS
6. BECOMING EASILY ANNOYED OR IRRITABLE: MORE THAN HALF THE DAYS
7. FEELING AFRAID AS IF SOMETHING AWFUL MIGHT HAPPEN: SEVERAL DAYS
IF YOU CHECKED OFF ANY PROBLEMS ON THIS QUESTIONNAIRE, HOW DIFFICULT HAVE THESE PROBLEMS MADE IT FOR YOU TO DO YOUR WORK, TAKE CARE OF THINGS AT HOME, OR GET ALONG WITH OTHER PEOPLE: SOMEWHAT DIFFICULT
2. NOT BEING ABLE TO STOP OR CONTROL WORRYING: SEVERAL DAYS
GAD7 TOTAL SCORE: 11
5. BEING SO RESTLESS THAT IT IS HARD TO SIT STILL: MORE THAN HALF THE DAYS

## 2019-06-22 NOTE — PROGRESS NOTES
Subjective     Susan Dietrich is a 59 year old female who presents to clinic today for the following health issues:    History of Present Illness        Diabetes:   She presents for follow up of diabetes.  She is checking home blood glucose a few times a week. She checks blood glucose before meals and after meals.  Blood glucose is sometimes over 200 and never under 70. She is aware of hypoglycemia symptoms including shakiness, dizziness and blurred vision. She is concerned about other. She is having blurry vision. The patient has had a diabetic eye exam in the last 12 months.     Diabetes Management Resources    She eats 2-3 servings of fruits and vegetables daily.She consumes 1 sweetened beverage(s) daily.  She is taking medications regularly.     Diabetes Follow-up       How often are you checking your blood sugar? A few times a week    What time of day are you checking your blood sugars (select all that apply)?  Before meals    Have you had any blood sugars above 200?  No    Have you had any blood sugars below 70?  No    What symptoms do you notice when your blood sugar is low?  None    What concerns do you have today about your diabetes? None     Do you have any of these symptoms? (Select all that apply)  No numbness or tingling in feet.  No redness, sores or blisters on feet.  No complaints of excessive thirst.  No reports of blurry vision.  No significant changes to weight.     Have you had a diabetic eye exam in the last 12 months? Yes- Date of last eye exam: rojelio pt feels like she got this done     Gets up at 3:30 in the morning 3 times per week, due to work. Does not check them at 3 am, does not check them in the evening  Takes all her meds, does not skip    Family dynamics, worried about her sister,  is now on medicare and pt is researching all this, some stress with his health.     Been to derm for spots on arms and legs, gave her some creams, and stopped drinking water softener, drinking  water cooler now and this helped    BP Readings from Last 2 Encounters:   06/22/19 121/81   01/21/19 122/84     Hemoglobin A1C (%)   Date Value   01/21/2019 9.6 (H)   11/21/2018 9.4 (H)     LDL Cholesterol Calculated (mg/dL)   Date Value   01/21/2019 59   07/17/2018 54       Diabetes Management Resources    Amount of exercise or physical activity: 1 day/week for an average of less than 15 minutes    Problems taking medications regularly: No    Medication side effects: none    Diet: regular (no restrictions)      PROBLEMS TO ADD ON...    Recent Labs   Lab Test 06/22/19  0909 01/21/19  0850 11/21/18  0831 07/17/18  0956 03/09/18  0921 12/15/17  0845 09/12/17  0851   A1C 9.9* 9.6* 9.4* 8.7* 7.8* 8.5* 7.8*   LDL  --  59  --  54 42  --  46   HDL  --  46*  --  46* 47*  --  47*   TRIG  --  145  --  144 135  --  132   ALT  --   --   --  46  --  38 36   CR  --   --   --  0.72  --  0.73 0.73   GFRESTIMATED  --   --   --  84  --  82 83   GFRESTBLACK  --   --   --  >90  --  >90 >90   POTASSIUM  --   --   --  4.1  --  4.1 4.1   TSH  --   --   --  1.34  --  1.41  --       BP Readings from Last 3 Encounters:   06/22/19 121/81   01/21/19 122/84   09/20/18 136/88    Wt Readings from Last 3 Encounters:   06/22/19 93.4 kg (206 lb)   01/21/19 92.1 kg (203 lb)   09/20/18 92.7 kg (204 lb 6.4 oz)                    Reviewed and updated as needed this visit by Provider         Review of Systems   ROS COMP: Constitutional, HEENT, cardiovascular, pulmonary, gi and gu systems are negative, except as otherwise noted.      Objective    LMP 10/03/2010   There is no height or weight on file to calculate BMI.  Physical Exam   GENERAL: healthy, alert and no distress  EYES: Eyes grossly normal to inspection, PERRL and conjunctivae and sclerae normal  HENT: ear canals and TM's normal, nose and mouth without ulcers or lesions  NECK: no adenopathy, no asymmetry, masses, or scars and thyroid normal to palpation  RESP: lungs clear to auscultation - no  rales, rhonchi or wheezes  CV: regular rate and rhythm, normal S1 S2, no S3 or S4, no murmur, click or rub, no peripheral edema and peripheral pulses strong  MS: no gross musculoskeletal defects noted, no edema  SKIN: no suspicious lesions or rashes  NEURO: Normal strength and tone, mentation intact and speech normal    Diagnostic Test Results:  Labs reviewed in Epic        Assessment & Plan     1. Type 2 diabetes, uncontrolled, with neuropathy  Uncontrolled, pt feels no motivation, she knows this is due to her mood, is willing to see another provider, discussed options, she wants to see Esdras Hong  - Hemoglobin A1c  - Comprehensive metabolic panel  - lisinopril (PRINIVIL/ZESTRIL) 5 MG tablet; Take 1 tablet (5 mg) by mouth daily  Dispense: 90 tablet; Refill: 1  - metFORMIN (GLUCOPHAGE) 500 MG tablet; Take 2 tablets (1,000 mg) by mouth 2 times daily (with meals)  Dispense: 360 tablet; Refill: 1  - glipiZIDE (GLIPIZIDE XL) 5 MG 24 hr tablet; TAKE 3 TABLETS BY MOUTH DAILY  Dispense: 90 tablet; Refill: 0    2. Special screening for malignant neoplasms, colon  Pt will turn this in  - Fecal colorectal cancer screen (FIT); Future    3. obesity due to excess calories (H)  icnrease dose in hopes it helps with weight and therefore sugars  - topiramate (TOPAMAX) 100 MG tablet; Take 1 tablet (100 mg) by mouth 2 times daily  Dispense: 60 tablet; Refill: 5    4. Major depressive disorder, recurrent episode, moderate (H)  Poor control,which is effecting her health and sugars also, but pt not willing to add or make any changes at this time  - buPROPion (WELLBUTRIN XL) 150 MG 24 hr tablet; Take 1 tablet (150 mg) by mouth every morning  Dispense: 90 tablet; Refill: 1    5. Hyperlipidemia LDL goal <100  Refilled, controlled  - atorvastatin (LIPITOR) 10 MG tablet; Take 1 tablet (10 mg) by mouth daily  Dispense: 90 tablet; Refill: 3     BMI:   Estimated body mass index is 34.28 kg/m  as calculated from the following:    Height as  "of this encounter: 1.651 m (5' 5\").    Weight as of this encounter: 93.4 kg (206 lb).   Weight management plan: Discussed healthy diet and exercise guidelines        Work on weight loss  Regular exercise    Return in about 3 months (around 9/22/2019) for depression/anxiety medication recheck.    Concha Lucas MD  [facility]    "

## 2019-06-23 ASSESSMENT — ANXIETY QUESTIONNAIRES: GAD7 TOTAL SCORE: 11

## 2019-07-29 ENCOUNTER — OFFICE VISIT (OUTPATIENT)
Dept: PHARMACY | Facility: CLINIC | Age: 59
End: 2019-07-29
Payer: COMMERCIAL

## 2019-07-29 VITALS — DIASTOLIC BLOOD PRESSURE: 86 MMHG | SYSTOLIC BLOOD PRESSURE: 120 MMHG | HEART RATE: 80 BPM | OXYGEN SATURATION: 96 %

## 2019-07-29 DIAGNOSIS — E78.5 HYPERLIPIDEMIA LDL GOAL <100: ICD-10-CM

## 2019-07-29 DIAGNOSIS — I10 HTN, GOAL BELOW 140/90: ICD-10-CM

## 2019-07-29 PROCEDURE — 99607 MTMS BY PHARM ADDL 15 MIN: CPT | Performed by: PHARMACIST

## 2019-07-29 PROCEDURE — 99605 MTMS BY PHARM NP 15 MIN: CPT | Performed by: PHARMACIST

## 2019-07-29 NOTE — PATIENT INSTRUCTIONS
"Recommendations from today's MTM visit:                                                    MTM (medication therapy management) is a service provided by a clinical pharmacist designed to help you get the most of out of your medicines.   Today we reviewed what your medicines are for, how to know if they are working, that your medicines are safe and how to make your medicine regimen as easy as possible.     1. a1c =9.9% , weight  >200lbs.    Lets keep you on the metformin 8v550dm tabs twice daily , but lets STOP all the glipizide.  Please read my 1 page low-carb diet handout.   Walk daily --strive for 7,500 steps /day as best you can .     Check your blood sugars 2 x day : before first meal of the day and 2 hrs after any main meal of the day .   If your blood sugar goes >400 --take 3 -glipizide to bring it down.     1. Insulin is your body's fat storage hormone. When you eat meals high in carbohydrates your pancreas releases insulin to store the excess food energy as fat. By intermittent fasting, or eating all of your daily food in an 8 hour period while fasting the remaining 16, you reduce the amount of insulin your body is releasing, resulting in less storage of food energy as fat. Focusing on a diet as low in carbohydrate as you can tolerate will also help reduce the amount of insulin your pancreas releases. Low carbohydrate diet + intermittent fasting will result in weight loss and improved blood sugars with fewer medications!     2. When you feel hungry in your fasting window, try drinking a zero calorie liquid, like water,coffee, tea or diet soda  first. If you still feel hungry, try eating a low carbohydrate snack like pickles.      3. Check out [employer](join free portion of website) or \"The Diabetes Code\" by Dr. Bruce Stokes for more details.      Next MTM visit:  1 month with your blood sugar meter--Monday August 26th -2019 at 10:00am.     To schedule another MTM appointment, please call the clinic " directly or you may call the MTM scheduling line at [phone] or toll-free at [phone].     My Clinical Pharmacist's contact information:                                                      It was a pleasure talking with you today!  Please feel free to contact me with any questions or concerns you have.      Gena Bergeron Rph.  Medication Therapy Management Provider  [phone]      You may receive a survey about the MT services you received by email and/or US Mail.  I would appreciate your feedback to help me serve you better in the future. Your comments will be anonymous.

## 2019-07-31 PROCEDURE — 82274 ASSAY TEST FOR BLOOD FECAL: CPT | Performed by: FAMILY MEDICINE

## 2019-08-04 LAB — HEMOCCULT STL QL IA: NEGATIVE

## 2019-08-05 DIAGNOSIS — Z12.11 SPECIAL SCREENING FOR MALIGNANT NEOPLASMS, COLON: ICD-10-CM

## 2019-08-26 ENCOUNTER — OFFICE VISIT (OUTPATIENT)
Dept: PHARMACY | Facility: CLINIC | Age: 59
End: 2019-08-26
Payer: COMMERCIAL

## 2019-08-26 VITALS
HEART RATE: 77 BPM | DIASTOLIC BLOOD PRESSURE: 78 MMHG | SYSTOLIC BLOOD PRESSURE: 124 MMHG | BODY MASS INDEX: 33.32 KG/M2 | WEIGHT: 200.2 LBS | OXYGEN SATURATION: 95 %

## 2019-08-26 DIAGNOSIS — E78.5 HYPERLIPIDEMIA LDL GOAL <100: ICD-10-CM

## 2019-08-26 DIAGNOSIS — I10 HTN, GOAL BELOW 140/90: ICD-10-CM

## 2019-08-26 PROCEDURE — 99606 MTMS BY PHARM EST 15 MIN: CPT | Performed by: PHARMACIST

## 2019-08-26 PROCEDURE — 99607 MTMS BY PHARM ADDL 15 MIN: CPT | Performed by: PHARMACIST

## 2019-08-26 NOTE — PATIENT INSTRUCTIONS
Recommendations from today's MTM visit:                                                      1. FYI-- weight today is 200.2lbs .   Please stay on metformin twice daily , lets ADD weekly Trulicity 0.75mg. .Congrats on your first successful injection.     Check blood sugars 2 x day , keep 8 hour eating window --eat as low in carbohydrate as tolerated, drink non-calorie liquids in your 16 hour fasting window.     Walk daily for exercise as best you can .     It was great to speak with you today.  I value your experience and would be very thankful for your time with providing feedback on our clinic survey. You may receive a survey via email or text message in the next few days.     Next MTM visit:  See me with blood sugar meter in 1 month - September 23rd at 10;50am.     To schedule another MTM appointment, please call the clinic directly or you may call the MTM scheduling line at [phone] or toll-free at [phone].     My Clinical Pharmacist's contact information:                                                      It was a pleasure talking with you today!  Please feel free to contact me with any questions or concerns you have.      Gena Bergeron [facility].  Medication Therapy Management Provider  [phone]

## 2019-08-26 NOTE — PROGRESS NOTES
SUBJECTIVE/OBJECTIVE:                           Susan Dietrich is a 59 year old female coming in for a follow-up (7-29-19) visit for Medication Therapy Management.  She was referred to me from Concha Lucas  .    Chief Complaint:  bs review, wt. Recheck.     8 hour eating window is doable --she is trying the If diet .  Her and [employer] work schedule go back to normal this week.           Personal Healthcare Goals: lose weight and control DM.    Allergies/ADRs: Reviewed in Epic  Tobacco: History of tobacco dependence - quit 30+ years ago.   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   PMH: Reviewed in Epic    Medication Adherence/Access:  no issues reported    Diabetes:  Pt currently taking : metformin 500mg -2 tabs bid , glipizide er 5mg -3 tabs daily. Pt is experiencing the following side effects: diarrhea and weight gain  SMBG: one time daily.   Ranges (patient glucometer):  7 days avg. =260, 14 days =261 30 days =246.    Date FBG/ 2hours post Lunch/2hours post Dinner /2hours post     8-26 204  818am      8-23 288 820am      8-21 294 720am      8-20 261 854am      8-18 258 8-18                                            Patient is not experiencing hypoglycemia  Recent symptoms of high blood sugar? fatigue  Eye exam: due  Foot exam: up to date  ACEi/ARB: Yes: Lisinopril.   Urine Albumin:   Lab Results   Component Value Date    UMALCR 7.51 09/20/2018      Aspirin: Taking 325mg daily and denies side effects--she feels pcp told her to take full dose asa daily.  Diet/Exercise;  Not much exercise past month , she is trying her best on the low carb IF diet .       previously this was her diet:  : addicted to sweets , doesn't like veggies   B-fast - takes prevalite with peach joey juice qam, eats a belvita bar, then lunch - leftovers night before or laughing cow cheese and crackers or a JJ sandwich or nothing , supper - english muffin and egg if she ate  "something for lunch , cooks supper- pock chops , potatoes, spaghetti, rice, chicken a lot , hamburger helper.  Meal prepping is a chore.  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    Snacks late night - 3 oreo cookies most nights. Some sweet snacks during day or potato chips , jodie corn , trying to avoid high fructose. Candy not an issue like the muffins etc.     Drinks milk and water.             Lab Results   Component Value Date    A1C 9.9 06/22/2019    A1C 9.6 01/21/2019    A1C 9.4 11/21/2018    A1C 8.7 07/17/2018    A1C 7.8 03/09/2018             Ha's-topiramate .      Hypertension: Current medications include : lisinopril 5mg./day.  Patient does not self-monitor BP.  Patient reports no current medication side effects.      Hyperlipidemia: Current therapy includes : Atorvastatin 10mg once daily.  Pt reports no significant myalgias or other side effects.  Recent Labs   Lab Test 01/21/19  0850 07/17/18  0956  08/24/15  0902  11/21/14  0808   CHOL 134 129   < > 138  --  126   HDL 46* 46*   < > 44*  --  54   LDL 59 54   < > 69   < > 50   TRIG 145 144   < > 123  --  111   CHOLHDLRATIO  --   --   --  3.1  --  2.3    < > = values in this interval not displayed.               BP Readings from Last 1 Encounters:   08/26/19 124/78     Pulse Readings from Last 1 Encounters:   08/26/19 77     Wt Readings from Last 1 Encounters:   08/26/19 200 lb 3.2 oz (90.8 kg)     Ht Readings from Last 1 Encounters:   06/22/19 5' 5\" (1.651 m)     Estimated body mass index is 33.32 kg/m  as calculated from the following:    Height as of 6/22/19: 5' 5\" (1.651 m).    Weight as of this encounter: 200 lb 3.2 oz (90.8 kg).    Temp Readings from Last 1 Encounters:   06/22/19 98.2  F (36.8  C) (Oral)           ASSESSMENT:                             Current medications were reviewed today as discussed above.     Medication Adherence: excellent, no issues identified    Diabetes: Needs Improvement. Patient is not meeting A1c goal of < 7%. Self monitoring " of blood glucose is not at goal of fasting  mg/dL and post prandial < 150 mg/dL. Pt would benefit from minimum SMBG: Check blood sugars fasting, and occasionally 2 hours after starting a meal.  Metformin :  stay on the same dose.  GLP-1(Trulicity) : start weekly 0.75. Dose --first shot successfully given in clinic today -she remarked it was easy to do.   Increased exercise-walk daily .   Increase in home glucose monitoring--see plan for details.   Weight loss recommended--long educational session of what is IF and low carb diet -see plan for details.      Aspirin therapy is safe and appropriate. Aspirin therapy is indicated in this patient at dose of 81mg daily -unsure why she is on 325mg ./day ? Will discuss with pcp.    Hypertension: Stable. Patient is meeting BP goal of < 140/90mmHg.     Hyperlipidemia: Stable. Pt is on moderate intensity statin which is indicated based on 2013 ACC/AHA guidelines for lipid management.          PLAN:                            1. FYI-- weight today is 200.2lbs .   Please stay on metformin twice daily , lets ADD weekly Trulicity 0.75mg. .Congrats on your first successful injection.     Check blood sugars 2 x day , keep 8 hour eating window --eat as low in carbohydrate as tolerated, drink non-calorie liquids in your 16 hour fasting window.     Walk daily for exercise as best you can .     It was great to speak with you today.  I value your experience and would be very thankful for your time with providing feedback on our clinic survey. You may receive a survey via email or text message in the next few days.     Next MTM visit:  See me with blood sugar meter in 1 month - September 23rd at 10;50am.         I spent 60 minutes with this patient today. All changes were made via collaborative practice agreement with Concha Lucas  . A copy of the visit note was provided to the patient's primary care provider.        The patient was given a summary of these recommendations  as an after visit summary.     Gena Bergeron Rph.  Medication Therapy Management Provider  [phone]

## 2019-09-06 ENCOUNTER — TRANSFERRED RECORDS (OUTPATIENT)
Dept: HEALTH INFORMATION MANAGEMENT | Facility: CLINIC | Age: 59
End: 2019-09-06

## 2019-09-16 ENCOUNTER — TRANSFERRED RECORDS (OUTPATIENT)
Dept: HEALTH INFORMATION MANAGEMENT | Facility: CLINIC | Age: 59
End: 2019-09-16

## 2019-09-16 LAB — RETINOPATHY: NEGATIVE

## 2019-09-20 NOTE — PROGRESS NOTES
SUBJECTIVE/OBJECTIVE:                           Susan Dietrich is a 59 year old female coming in for a follow-up (8-26-19) visit for Medication Therapy Management.  She was referred to me from Concha Lucas  .    Chief Complaint:  bs review, wt. Recheck.     Feels like stomach ache all the time + lots of gas.     8 hour eating window is doable --she is trying the If diet .  Her and [employer] work schedule go back to normal this week.           Personal Healthcare Goals: lose weight and control DM.    Allergies/ADRs: Reviewed in Epic  Tobacco: History of tobacco dependence - quit 30+ years ago.   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   PMH: Reviewed in Epic; no gall bladder.     Medication Adherence/Access:  no issues reported    Diabetes:  Pt currently taking : metformin 500mg -2 tabs bid , trulicity 0.75.mg/week. . Pt is experiencing the following side effects: constipated -no bm's x close to a week , now 2 x week , then diarrhea like. Now daily stomach ache and gas --just wants to lay down.       SMBG: one time daily.   Ranges (patient glucometer):    7 days avg. =211,  30 days =218, 14 days =209.     Date FBG/ 2hours post Lunch/2hours post Dinner /2hours post    9-23-19 188 803am      9-22   209 806pm    9-20 186 9;19am      9-18    259 3;30am   9-14 256 6;56am      9-13 186 802am      9-12    180 3;26am                 7 days avg. =260, 14 days =261 30 days =246.    Date FBG/ 2hours post Lunch/2hours post Dinner /2hours post     8-26 204  818am      8-23 288 820am      8-21 294 720am      8-20 261 854am      8-18 258 8-18                        Patient is not experiencing hypoglycemia  Recent symptoms of high blood sugar? fatigue  Eye exam: due  Foot exam: up to date  ACEi/ARB: Yes: Lisinopril.   Urine Albumin:   Lab Results   Component Value Date    UMALCR 7.51 09/20/2018      Aspirin: Taking 325mg daily and denies side effects--she feels  "pcp told her to take full dose asa daily.  Diet/Exercise;  Not much exercise past month , she is trying her best on the low carb IF diet .       previously this was her diet:  : addicted to sweets , doesn't like veggies   B-fast - takes prevalite with peach joey juice qam, eats a belvita bar, then lunch - leftovers night before or laughing cow cheese and crackers or a JJ sandwich or nothing , supper - english muffin and egg if she ate something for lunch , cooks supper- pock chops , potatoes, spaghetti, rice, chicken a lot , hamburger helper.  Meal prepping is a chore.  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    Snacks late night - 3 oreo cookies most nights. Some sweet snacks during day or potato chips , jodie corn , trying to avoid high fructose. Candy not an issue like the muffins etc.     Drinks milk and water.             Lab Results   Component Value Date    A1C 9.9 06/22/2019    A1C 9.6 01/21/2019    A1C 9.4 11/21/2018    A1C 8.7 07/17/2018    A1C 7.8 03/09/2018             Ha's-topiramate .      Hypertension: Current medications include : lisinopril 5mg./day.  Patient does not self-monitor BP.  Patient reports no current medication side effects.      Hyperlipidemia: Current therapy includes : Atorvastatin 10mg once daily.  Pt reports no significant myalgias or other side effects.  Recent Labs   Lab Test 01/21/19  0850 07/17/18  0956  08/24/15  0902  11/21/14  0808   CHOL 134 129   < > 138  --  126   HDL 46* 46*   < > 44*  --  54   LDL 59 54   < > 69   < > 50   TRIG 145 144   < > 123  --  111   CHOLHDLRATIO  --   --   --  3.1  --  2.3    < > = values in this interval not displayed.               BP Readings from Last 1 Encounters:   09/23/19 120/84     Pulse Readings from Last 1 Encounters:   09/23/19 89     Wt Readings from Last 1 Encounters:   09/23/19 196 lb 9.6 oz (89.2 kg)     Ht Readings from Last 1 Encounters:   06/22/19 5' 5\" (1.651 m)     Estimated body mass index is 32.72 kg/m  as calculated from the " "following:    Height as of 6/22/19: 5' 5\" (1.651 m).    Weight as of this encounter: 196 lb 9.6 oz (89.2 kg).    Temp Readings from Last 1 Encounters:   06/22/19 98.2  F (36.8  C) (Oral)             ASSESSMENT:                             Current medications were reviewed today as discussed above.     Medication Adherence: excellent, no issues identified    Diabetes: Needs Improvement. Patient is not meeting A1c goal of < 7%. Self monitoring of blood glucose is not at goal of fasting  mg/dL and post prandial < 150 mg/dL. Pt would benefit from minimum SMBG: Check blood sugars fasting, and occasionally 2 hours after starting a meal.  Metformin :  stay on the same dose.  GLP-1(Trulicity) :  STOp this drug due to GI Se;s.   sglt-2 --we briefly discussed adding jardiance now --she has labs -19 with pcp --will see me in 1month and may start this drug then ?   Increased exercise-walk daily .   Increase in home glucose monitoring--see plan for details.   Weight loss recommended--long re-educational session of what is IF and low carb diet -see plan for details.      Aspirin therapy is safe and appropriate. Aspirin therapy is indicated in this patient at dose of 81mg daily -unsure why she is on 325mg ./day ? Will discuss with pcp.    Hypertension: Stable. Patient is meeting BP goal of < 140/90mmHg.     Hyperlipidemia: Stable. Pt is on moderate intensity statin which is indicated based on 2013 ACC/AHA guidelines for lipid management.          PLAN:                            1.  FYI-- weight today is 196.6lbs --down 10 lbs now and blood sugars now better --but lets stop the trulicity x 30 days --see if stomachache resolves , do your best with low-carb diet and intermittent fasting --and yes eat to live as you said --you don't need 3 meals.day --maybe eat 1-2 x day .    2.  FYI--lets consider jardiance once daily at next office visit.     It was great to speak with you today.  I value your experience and would be " very thankful for your time with providing feedback on our clinic survey. You may receive a survey via email or text message in the next few days.     Next MTM visit:  See dr. kessler for lab etc 10-18-19.  See me Monday October 28th -2019 at 2;35pm with blood sugar meter.         I spent 30 minutes with this patient today. All changes were made via collaborative practice agreement with Concha Kessler  . A copy of the visit note was provided to the patient's primary care provider.        The patient was given a summary of these recommendations as an after visit summary.     Gena Bergeron Rph.  Medication Therapy Management Provider  [phone]

## 2019-09-23 ENCOUNTER — OFFICE VISIT (OUTPATIENT)
Dept: PHARMACY | Facility: CLINIC | Age: 59
End: 2019-09-23
Payer: COMMERCIAL

## 2019-09-23 VITALS
OXYGEN SATURATION: 96 % | HEART RATE: 89 BPM | DIASTOLIC BLOOD PRESSURE: 84 MMHG | SYSTOLIC BLOOD PRESSURE: 120 MMHG | WEIGHT: 196.6 LBS | BODY MASS INDEX: 32.72 KG/M2

## 2019-09-23 DIAGNOSIS — I10 HTN, GOAL BELOW 140/90: ICD-10-CM

## 2019-09-23 DIAGNOSIS — E78.5 HYPERLIPIDEMIA LDL GOAL <100: ICD-10-CM

## 2019-09-23 PROCEDURE — 99607 MTMS BY PHARM ADDL 15 MIN: CPT | Performed by: PHARMACIST

## 2019-09-23 PROCEDURE — 99606 MTMS BY PHARM EST 15 MIN: CPT | Performed by: PHARMACIST

## 2019-09-23 NOTE — PATIENT INSTRUCTIONS
Recommendations from today's MTM visit:                                                      1.  FYI-- weight today is 196.6lbs --down 10 lbs now and blood sugars now better --but lets stop the trulicity x 30 days --see if stomachache resolves , do your best with low-carb diet and intermittent fasting --and yes eat to live as you said --you don't need 3 meals.day --maybe eat 1-2 x day .    2.  FYI--lets consider jardiance once daily at next office visit.     It was great to speak with you today.  I value your experience and would be very thankful for your time with providing feedback on our clinic survey. You may receive a survey via email or text message in the next few days.     Next MTM visit:  See dr. kessler for lab etc 10-18-19.  See me Monday October 28th -2019 at 2;35pm with blood sugar meter.     To schedule another MTM appointment, please call the clinic directly or you may call the MTM scheduling line at [phone] or toll-free at [phone].     My Clinical Pharmacist's contact information:                                                      It was a pleasure talking with you today!  Please feel free to contact me with any questions or concerns you have.      Gena Bergeron Rph.  Medication Therapy Management Provider  [phone]

## 2019-09-23 NOTE — Clinical Note
Concha--starr to see you 10-18 for labs --a1c will still be high --she had gi se's from trulicity , lets consider jardiance as next dm med to add on.Whitney

## 2019-10-14 ENCOUNTER — TELEPHONE (OUTPATIENT)
Dept: FAMILY MEDICINE | Facility: CLINIC | Age: 59
End: 2019-10-14

## 2019-10-14 NOTE — TELEPHONE ENCOUNTER
Panel Management Review      Patient has the following on her problem list:   Depression / Dysthymia review    Measure:  Needs PHQ-9 score of 4 or less during index window.  Administer PHQ-9 and if score is 5 or more, send encounter to provider for next steps.    5 - 7 month window range: PHQ-9 and VINITA-7 due NOW    PHQ-9 SCORE 3/9/2018 9/20/2018 6/22/2019   PHQ-9 Total Score - - -   PHQ-9 Total Score MyChart 9 (Mild depression) - -   PHQ-9 Total Score 9 9 11       If PHQ-9 recheck is 5 or more, route to provider for next steps.    Patient is due for:  PHQ-9 and VINITA-7    Diabetes    ASA: Passed    Last A1C  Lab Results   Component Value Date    A1C 9.9 06/22/2019    A1C 9.6 01/21/2019    A1C 9.4 11/21/2018    A1C 8.7 07/17/2018    A1C 7.8 03/09/2018     A1C tested: FAILED    Last LDL:    Lab Results   Component Value Date    CHOL 134 01/21/2019     Lab Results   Component Value Date    HDL 46 01/21/2019     Lab Results   Component Value Date    LDL 59 01/21/2019     Lab Results   Component Value Date    TRIG 145 01/21/2019     Lab Results   Component Value Date    CHOLHDLRATIO 3.1 08/24/2015     Lab Results   Component Value Date    NHDL 88 01/21/2019       Is the patient on a Statin? YES             Is the patient on Aspirin? YES    Medications     HMG CoA Reductase Inhibitors     atorvastatin (LIPITOR) 10 MG tablet       Salicylates     aspirin 325 MG EC tablet             Last three blood pressure readings:  BP Readings from Last 3 Encounters:   09/23/19 120/84   08/26/19 124/78   07/29/19 120/86       Date of last diabetes office visit: 6/22/19     Tobacco History:     History   Smoking Status     Former Smoker     Packs/day: 1.00     Years: 5.00     Types: Cigarettes     Quit date: 1/4/1985   Smokeless Tobacco     Never Used     Comment: quit in 1985 smoked one pack a day         Hypertension   Last three blood pressure readings:  BP Readings from Last 3 Encounters:   09/23/19 120/84   08/26/19 124/78    07/29/19 120/86     Blood pressure: Passed    HTN Guidelines:  Less than 140/90      Composite cancer screening  Chart review shows that this patient is due/due soon for the following Mammogram  Summary:    Patient is due/failing the following:   Microalbumin, A1C, FOLLOW UP and PHQ9    Action needed:   Patient needs office visit for depression/anxiety medication check and needs to do PHQ9.    Type of outreach:    Sent [employer] message.    Questions for provider review:    None                                                                                                                                    Lisa Magill, CMA       Chart routed to Care Team .

## 2019-10-18 ENCOUNTER — OFFICE VISIT (OUTPATIENT)
Dept: FAMILY MEDICINE | Facility: CLINIC | Age: 59
End: 2019-10-18
Payer: COMMERCIAL

## 2019-10-18 VITALS
HEART RATE: 74 BPM | HEIGHT: 65 IN | SYSTOLIC BLOOD PRESSURE: 108 MMHG | DIASTOLIC BLOOD PRESSURE: 74 MMHG | BODY MASS INDEX: 32.15 KG/M2 | RESPIRATION RATE: 16 BRPM | TEMPERATURE: 98.3 F | WEIGHT: 193 LBS

## 2019-10-18 DIAGNOSIS — N89.8 VAGINAL DISCHARGE: ICD-10-CM

## 2019-10-18 DIAGNOSIS — Z23 NEED FOR PROPHYLACTIC VACCINATION AND INOCULATION AGAINST INFLUENZA: ICD-10-CM

## 2019-10-18 DIAGNOSIS — E78.5 HYPERLIPIDEMIA LDL GOAL <100: ICD-10-CM

## 2019-10-18 DIAGNOSIS — I10 HTN, GOAL BELOW 140/90: ICD-10-CM

## 2019-10-18 DIAGNOSIS — I73.9 PVD (PERIPHERAL VASCULAR DISEASE) (H): ICD-10-CM

## 2019-10-18 DIAGNOSIS — F32.1 MODERATE MAJOR DEPRESSION (H): ICD-10-CM

## 2019-10-18 LAB
ALBUMIN SERPL-MCNC: 4 G/DL (ref 3.4–5)
ALP SERPL-CCNC: 88 U/L (ref 40–150)
ALT SERPL W P-5'-P-CCNC: 41 U/L (ref 0–50)
ANION GAP SERPL CALCULATED.3IONS-SCNC: 10 MMOL/L (ref 3–14)
AST SERPL W P-5'-P-CCNC: 23 U/L (ref 0–45)
BILIRUB SERPL-MCNC: 0.7 MG/DL (ref 0.2–1.3)
BUN SERPL-MCNC: 16 MG/DL (ref 7–30)
CALCIUM SERPL-MCNC: 9.2 MG/DL (ref 8.5–10.1)
CHLORIDE SERPL-SCNC: 107 MMOL/L (ref 94–109)
CO2 SERPL-SCNC: 20 MMOL/L (ref 20–32)
CREAT SERPL-MCNC: 0.61 MG/DL (ref 0.52–1.04)
ERYTHROCYTE [DISTWIDTH] IN BLOOD BY AUTOMATED COUNT: 14.4 % (ref 10–15)
GFR SERPL CREATININE-BSD FRML MDRD: >90 ML/MIN/{1.73_M2}
GLUCOSE SERPL-MCNC: 323 MG/DL (ref 70–99)
HBA1C MFR BLD: 11.9 % (ref 0–5.6)
HCT VFR BLD AUTO: 46.6 % (ref 35–47)
HGB BLD-MCNC: 15 G/DL (ref 11.7–15.7)
MCH RBC QN AUTO: 28.6 PG (ref 26.5–33)
MCHC RBC AUTO-ENTMCNC: 32.2 G/DL (ref 31.5–36.5)
MCV RBC AUTO: 89 FL (ref 78–100)
PLATELET # BLD AUTO: 211 10E9/L (ref 150–450)
POTASSIUM SERPL-SCNC: 3.9 MMOL/L (ref 3.4–5.3)
PROT SERPL-MCNC: 7.4 G/DL (ref 6.8–8.8)
RBC # BLD AUTO: 5.25 10E12/L (ref 3.8–5.2)
SODIUM SERPL-SCNC: 137 MMOL/L (ref 133–144)
SPECIMEN SOURCE: NORMAL
WBC # BLD AUTO: 9.2 10E9/L (ref 4–11)
WET PREP SPEC: NORMAL

## 2019-10-18 PROCEDURE — 83036 HEMOGLOBIN GLYCOSYLATED A1C: CPT | Performed by: FAMILY MEDICINE

## 2019-10-18 PROCEDURE — 90682 RIV4 VACC RECOMBINANT DNA IM: CPT | Performed by: FAMILY MEDICINE

## 2019-10-18 PROCEDURE — 99207 ZZC FOOT EXAM  NO CHARGE: CPT | Performed by: FAMILY MEDICINE

## 2019-10-18 PROCEDURE — 85027 COMPLETE CBC AUTOMATED: CPT | Performed by: FAMILY MEDICINE

## 2019-10-18 PROCEDURE — 99214 OFFICE O/P EST MOD 30 MIN: CPT | Mod: 25 | Performed by: FAMILY MEDICINE

## 2019-10-18 PROCEDURE — 90471 IMMUNIZATION ADMIN: CPT | Performed by: FAMILY MEDICINE

## 2019-10-18 PROCEDURE — 82043 UR ALBUMIN QUANTITATIVE: CPT | Performed by: FAMILY MEDICINE

## 2019-10-18 PROCEDURE — 80053 COMPREHEN METABOLIC PANEL: CPT | Performed by: FAMILY MEDICINE

## 2019-10-18 PROCEDURE — 36415 COLL VENOUS BLD VENIPUNCTURE: CPT | Performed by: FAMILY MEDICINE

## 2019-10-18 PROCEDURE — 87210 SMEAR WET MOUNT SALINE/INK: CPT | Performed by: FAMILY MEDICINE

## 2019-10-18 RX ORDER — GLIPIZIDE 5 MG/1
15 TABLET, FILM COATED, EXTENDED RELEASE ORAL DAILY
Qty: 90 TABLET | Refills: 5 | Status: SHIPPED | OUTPATIENT
Start: 2019-10-18 | End: 2019-10-28

## 2019-10-18 ASSESSMENT — MIFFLIN-ST. JEOR: SCORE: 1456.32

## 2019-10-18 NOTE — PROGRESS NOTES
Subjective     Susan Dietrich is a 59 year old female who presents to clinic today for the following health issues:    History of Present Illness        Diabetes:   She presents for follow up of diabetes.  She is checking home blood glucose one time daily. She checks blood glucose before meals.  Blood glucose is sometimes over 200 and never under 70. She is aware of hypoglycemia symptoms including shakiness, dizziness and blurred vision. She is concerned about other. She is having excessive thirst and blurry vision. The patient has had a diabetic eye exam in the last 12 months. Eye exam performed on september 2019.    Diabetes Management Resources    She eats 0-1 servings of fruits and vegetables daily.She consumes 1 sweetened beverage(s) daily.  She is taking medications regularly.     Diabetes Follow-up        How often are you checking your blood sugar? One time daily    What time of day are you checking your blood sugars (select all that apply)?  Before meals    Have you had any blood sugars above 200?  Yes sometimes    Have you had any blood sugars below 70?  No    What symptoms do you notice when your blood sugar is low?  Shaky, Dizzy and Blurred vision    What concerns do you have today about your diabetes? Getting infections often and Other: see above     Do you have any of these symptoms? (Select all that apply)  Excessive thirst and Blurry vision     Have you had a diabetic eye exam in the last 12 months? Yes- Date of last eye exam: see above     Stopped trulicity, due to stomach pains all the time, BMs were loose and cramps    Esdras PELAEZ took her off glipizide in July,     BP Readings from Last 2 Encounters:   10/18/19 108/74   09/23/19 120/84     Hemoglobin A1C (%)   Date Value   06/22/2019 9.9 (H)   01/21/2019 9.6 (H)     LDL Cholesterol Calculated (mg/dL)   Date Value   01/21/2019 59   07/17/2018 54       Diabetes Management Resources      How many servings of fruits and vegetables do you eat daily?   2-3    On average, how many sweetened beverages do you drink each day (soda, juice, sweet tea, etc)?   1    How many days per week do you miss taking your medication? 0    Pt has PVD, and is not exercising much, is now taking the stairs at work, which is new, she has a stationary bike at home and has been meaning to use it again.    Patient presents for evaluation of hypertension.  She indicates that she is feeling well and denies any symptoms referable to her elevated blood pressure. Specifically denies chest pain, palpitations, dyspnea, orthopnea, PND or peripheral edema. Current medication regimen is as listed below. Patient denies any side effects of medication.    Family history: positive for diabetes mellitus and cardiovascular disease  Age at onset of elevated blood pressure:   Cardiovascular risk factors: diabetes mellitus, hypertension, obesity and sedentary life style  Use of agents associated with hypertension: none      Patient presents for a recheck of hyperlipidemia.  The patient is currently following a low fat diet plan and is not following a regular exercise plan.  LDL Cholesterol Calculated   Date Value Ref Range Status   01/21/2019 59 <100 mg/dL Final     Comment:     Desirable:       <100 mg/dl     On prevaite and low dose lipitor      PROBLEMS TO ADD ON...    Recent Labs   Lab Test 06/22/19  0909 01/21/19  0850 11/21/18  0831 07/17/18  0956 03/09/18  0921 12/15/17  0845   A1C 9.9* 9.6* 9.4* 8.7* 7.8* 8.5*   LDL  --  59  --  54 42  --    HDL  --  46*  --  46* 47*  --    TRIG  --  145  --  144 135  --    ALT 38  --   --  46  --  38   CR 0.65  --   --  0.72  --  0.73   GFRESTIMATED >90  --   --  84  --  82   GFRESTBLACK >90  --   --  >90  --  >90   POTASSIUM 4.1  --   --  4.1  --  4.1   TSH  --   --   --  1.34  --  1.41      BP Readings from Last 3 Encounters:   10/18/19 108/74   09/23/19 120/84   08/26/19 124/78    Wt Readings from Last 3 Encounters:   10/18/19 87.5 kg (193 lb)   09/23/19 89.2 kg  "(196 lb 9.6 oz)   08/26/19 90.8 kg (200 lb 3.2 oz)                    Reviewed and updated as needed this visit by Provider  Problems         Review of Systems   ROS COMP: Constitutional, HEENT, cardiovascular, pulmonary, gi and gu systems are negative, except as otherwise noted.      Objective    /74 (BP Location: Right arm, Patient Position: Sitting, Cuff Size: Adult Large)   Pulse 74   Temp 98.3  F (36.8  C) (Oral)   Resp 16   Ht 1.651 m (5' 5\")   Wt 87.5 kg (193 lb)   LMP 10/03/2010   BMI 32.12 kg/m    Body mass index is 32.12 kg/m .  Physical Exam   GENERAL: healthy, alert and no distress  EYES: Eyes grossly normal to inspection, PERRL and conjunctivae and sclerae normal  HENT: ear canals and TM's normal, nose and mouth without ulcers or lesions  NECK: no adenopathy, no asymmetry, masses, or scars and thyroid normal to palpation  RESP: lungs clear to auscultation - no rales, rhonchi or wheezes  CV: regular rate and rhythm, normal S1 S2, no S3 or S4, no murmur, click or rub, no peripheral edema and peripheral pulses strong  MS: no gross musculoskeletal defects noted, no edema  SKIN: no suspicious lesions or rashes  NEURO: Normal strength and tone, mentation intact and speech normal  Diabetic foot exam: normal DP and PT pulses, no trophic changes or ulcerative lesions and normal sensory exam    Diagnostic Test Results:  Labs reviewed in Epic        Assessment & Plan     1. Type 2 diabetes, uncontrolled, with neuropathy (H)  Eye exam is done and normal, other the cataract and dry eyes  - Albumin Random Urine Quantitative with Creat Ratio  - HEMOGLOBIN A1C  - Comprehensive metabolic panel  - CBC with platelets    2. PVD (peripheral vascular disease) (H)  Encouraged daily exercise    3. Need for prophylactic vaccination and inoculation against influenza    - INFLUENZA QUAD, RECOMBINANT, P-FREE (RIV4) (FLUBLOCK) [15511]  - Vaccine Administration, Initial [01003]    4. Hyperlipidemia LDL goal " "<100  Controlled on low dose    5. HTN, goal below 140/90  Controlled on low dose ace    6. Moderate major depression (H)  Last PHQ-9 was high will recheck today    7. Vaginal discharge  Self wet prep ordered  - Wet prep     BMI:   Estimated body mass index is 32.12 kg/m  as calculated from the following:    Height as of this encounter: 1.651 m (5' 5\").    Weight as of this encounter: 87.5 kg (193 lb).   Weight management plan: Discussed healthy diet and exercise guidelines        Work on weight loss  Regular exercise    Return in about 6 months (around 4/18/2020) for wellness exam.    Concha Lucas MD  [facility]    "

## 2019-10-18 NOTE — PROGRESS NOTES
I am ok with your plan. If you could reach out to her or discuss at her up coming appt, let her know you talked with me and I am in agreement.    Thank you

## 2019-10-19 LAB
CREAT UR-MCNC: 186 MG/DL
MICROALBUMIN UR-MCNC: 24 MG/L
MICROALBUMIN/CREAT UR: 12.9 MG/G CR (ref 0–25)

## 2019-10-25 NOTE — PROGRESS NOTES
SUBJECTIVE/OBJECTIVE:                           Susan Dietrich is a 59 year old female coming in for a follow-up (9-23-19) visit for Medication Therapy Management.  She was referred to me from Concha Lucas  .    Dr. Amairani Crystal shadowed this visit today !    Chief Complaint:    Here for dm f/up -- she states trulicity just made her gi feel bad so she stopped it --a1c wnet up --back on glip but pcp now ok if mtm tweaks her dm meds further .     She is happy down 14 lbs on IF diet but some of that is due to higher bs's.  She has some concern about 8 hour eating window --pcp feels 6pm should not eat after , pt. Finding this a little restrictive.         Personal Healthcare Goals: lose weight and control DM.    Allergies/ADRs: Reviewed in Epic  Tobacco: History of tobacco dependence - quit 30+ years ago.   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   PMH: Reviewed in Epic; no gall bladder.     Medication Adherence/Access:  no issues reported    Diabetes:  Pt currently taking : metformin 500mg -2 tabs bid ,off glp-1 due to SE , 1 week back on 3x5mg glip pills.per pcp . Pt is experiencing the following side effects: loose stools 1-2 x week.  SMBG: one-two time daily.   Ranges (patient glucometer):      7 days avg. =240 , 14 days =253 , 30 days 248.     Date FBG/ 2hours post Lunch/2hours post Dinner /2hours post    10-28-19 137 754am      10-27 176 808am  501 6;10pm - banana bread     10-25 192 724am      10-24    191 3;27am    10-23 234  3;30am       10-22 248 328am      10-21 283  8 AM                 7 days avg. =211,  30 days =218, 14 days =209.     Date FBG/ 2hours post Lunch/2hours post Dinner /2hours post    9-23-19 188 803am      9-22   209 806pm    9-20 186 9;19am      9-18    259 3;30am   9-14 256 6;56am      9-13 186 802am      9-12    180 3;26am                 7 days avg. =260, 14 days =261 30 days =246.    Date FBG/ 2hours post  Lunch/2hours post Dinner /2hours post     8-26 204  818am      8-23 288 820am      8-21 294 720am      8-20 261 854am      8-18 258 8-18                        Patient is not experiencing hypoglycemia  Recent symptoms of high blood sugar? fatigue  Eye exam: due  Foot exam: up to date  ACEi/ARB: Yes: Lisinopril.   Urine Albumin:   Lab Results   Component Value Date    UMALCR 12.90 10/18/2019      Aspirin: Taking 325mg daily and denies side effects--she feels pcp told her to take full dose asa daily.  Diet/Exercise;  Not much exercise past month , she is trying her best on the low carb IF diet .       previously this was her diet:  : addicted to sweets , doesn't like veggies   B-fast - takes prevalite with peach joey juice qam, eats a belvita bar, then lunch - leftovers night before or laughing cow cheese and crackers or a JJ sandwich or nothing , supper - english muffin and egg if she ate something for lunch , cooks supper- pock chops , potatoes, spaghetti, rice, chicken a lot , hamburger helper.  Meal prepping is a chore.  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    Snacks late night - 3 oreo cookies most nights. Some sweet snacks during day or potato chips , jodie corn , trying to avoid high fructose. Candy not an issue like the muffins etc.     Drinks milk and water.       Her 8 hour eating window is 11-7 or 12-8pm. She is doing her best with IF diet but states lots of stress eating in her life!    Not exercising but wants to get back to it  Has all equipment at home -- [place] track and bike at home , weights.       Lab Results   Component Value Date    A1C 11.9 10/18/2019    A1C 9.9 06/22/2019    A1C 9.6 01/21/2019    A1C 9.4 11/21/2018    A1C 8.7 07/17/2018             Ha's-topiramate .      Hypertension: Current medications include : lisinopril 5mg./day.  Patient does not self-monitor BP.  Patient reports no current medication side effects.  BP Readings from Last 3 Encounters:   10/28/19 126/82   10/18/19 108/74  "  09/23/19 120/84         Hyperlipidemia: Current therapy includes : Atorvastatin 10mg once daily.  Pt reports no significant myalgias or other side effects.  Recent Labs   Lab Test 01/21/19  0850 07/17/18  0956  08/24/15  0902  11/21/14  0808   CHOL 134 129   < > 138  --  126   HDL 46* 46*   < > 44*  --  54   LDL 59 54   < > 69   < > 50   TRIG 145 144   < > 123  --  111   CHOLHDLRATIO  --   --   --  3.1  --  2.3    < > = values in this interval not displayed.               BP Readings from Last 1 Encounters:   10/28/19 126/82     Pulse Readings from Last 1 Encounters:   10/28/19 81     Wt Readings from Last 1 Encounters:   10/28/19 192 lb 12.8 oz (87.5 kg)     Ht Readings from Last 1 Encounters:   10/18/19 5' 5\" (1.651 m)     Estimated body mass index is 32.08 kg/m  as calculated from the following:    Height as of 10/18/19: 5' 5\" (1.651 m).    Weight as of this encounter: 192 lb 12.8 oz (87.5 kg).    Temp Readings from Last 1 Encounters:   10/18/19 98.3  F (36.8  C) (Oral)               ASSESSMENT:                             Current medications were reviewed today as discussed above.     Medication Adherence: excellent, no issues identified    Diabetes: Needs Improvement. Patient is not meeting A1c goal of < 7%. Self monitoring of blood glucose is not at goal of fasting  mg/dL and post prandial < 150 mg/dL. Pt would benefit from minimum SMBG: Check blood sugars fasting, and occasionally 2 hours after starting a meal.FYI: we discussed mando cgm --but her ins. Will not cover it -bummer!    Metformin :  stay on the same dose.  Consider ER tabs when current bottle runs out to avoid loose stools.   SGLT-2(Jardiance):  Educated pt. On potential SE's --she will start 10mg./daily tab tomorrow.  SFU:  STOP all glipizide today .  TZD:  Start 15mg pioglitazone qday tomorrow.   Increased exercise-walk daily .   Increase in home glucose monitoring--see plan for details.  Weight loss recommended--long re-educational " session of what is IF and low carb diet -see plan for details.      Aspirin therapy is safe and appropriate. Aspirin therapy is indicated in this patient at dose of 81mg daily -unsure why she is on 325mg ./day ? Will discuss with pcp.    Hypertension: Stable. Patient is meeting BP goal of < 140/90mmHg.     Hyperlipidemia: Stable. Pt is on moderate intensity statin which is indicated based on 2013 ACC/AHA guidelines for lipid management.          PLAN:                          1.  A1c is 11.9% --goal is 7% or less.   Check blood sugars 2 x day for sure --before first meal of the day (goal then is ) , then check 2 hours after your biggest meal of the day (goal then is <150).     Do your best with low-carb diet in your eating window, stay active on your nordic track or bike.  8 hour eating window can slide from day to day , 16 hours non calorie liquids and sleep.     Per our discussion on how insulin works --lets stop your glipizide today , start jardiance 10mg tab--1 daily in AM, and 15mg. pioglitazone once daily any time of the day .       1. Insulin is your body's fat storage hormone. When you eat meals high in carbohydrates your pancreas releases insulin to store the excess food energy as fat. By intermittent fasting, or eating all of your daily food in an 8 hour period while fasting the remaining 16, you reduce the amount of insulin your body is releasing, resulting in less storage of food energy as fat. Focusing on a diet as low in carbohydrate as you can tolerate will also help reduce the amount of insulin your pancreas releases. Low carbohydrate diet + intermittent fasting will result in weight loss and improved blood sugars with fewer medications!     2. When you feel hungry in your fasting window, try drinking a zero calorie liquid, like water,coffee, tea or diet soda  first. If you still feel hungry, try eating a low carbohydrate snack like pickles.      3. Check out [employer](join free portion of  "website) or \"The Diabetes Code\" by Dr. Bruce Stokes for more details.      fyi--your on immediate release metformin which can cause loose stools --I can change you to ER or extended release version of this drug when you run out of your current bottle.     It was great to speak with you today.  I value your experience and would be very thankful for your time with providing feedback on our clinic survey. You may receive a survey via email or text message in the next few days.     Next [facility] visit:  11-25-19 at 11:05am --please bring blood sugar meter to the visit.       I spent 30 minutes with this patient today. All changes were made via collaborative practice agreement with Concha Lucas  . A copy of the visit note was provided to the patient's primary care provider.        The patient was given a summary of these recommendations as an after visit summary.     Gena Bergeron Rph.  Medication Therapy Management Provider  [phone]  "

## 2019-10-28 ENCOUNTER — OFFICE VISIT (OUTPATIENT)
Dept: PHARMACY | Facility: CLINIC | Age: 59
End: 2019-10-28
Payer: COMMERCIAL

## 2019-10-28 VITALS
DIASTOLIC BLOOD PRESSURE: 82 MMHG | SYSTOLIC BLOOD PRESSURE: 126 MMHG | WEIGHT: 192.8 LBS | OXYGEN SATURATION: 95 % | HEART RATE: 81 BPM | BODY MASS INDEX: 32.08 KG/M2

## 2019-10-28 DIAGNOSIS — I10 HTN, GOAL BELOW 140/90: ICD-10-CM

## 2019-10-28 DIAGNOSIS — E78.5 HYPERLIPIDEMIA LDL GOAL <100: ICD-10-CM

## 2019-10-28 PROCEDURE — 99607 MTMS BY PHARM ADDL 15 MIN: CPT | Performed by: PHARMACIST

## 2019-10-28 PROCEDURE — 99606 MTMS BY PHARM EST 15 MIN: CPT | Performed by: PHARMACIST

## 2019-10-28 RX ORDER — FLASH GLUCOSE SENSOR
1 KIT MISCELLANEOUS
Qty: 2 EACH | Refills: 12 | Status: SHIPPED | OUTPATIENT
Start: 2019-10-28 | End: 2019-11-25

## 2019-10-28 RX ORDER — PIOGLITAZONEHYDROCHLORIDE 15 MG/1
15 TABLET ORAL DAILY
Qty: 30 TABLET | Refills: 5 | Status: SHIPPED | OUTPATIENT
Start: 2019-10-28 | End: 2019-11-25

## 2019-10-28 NOTE — PATIENT INSTRUCTIONS
"Recommendations from today's MTM visit:                                                      1.  A1c is 11.9% --goal is 7% or less.   Check blood sugars 2 x day for sure --before first meal of the day (goal then is ) , then check 2 hours after your biggest meal of the day (goal then is <150).     Do your best with low-carb diet in your eating window, stay active on your nordic track or bike.  8 hour eating window can slide from day to day , 16 hours non calorie liquids and sleep.     Per our discussion on how insulin works --lets stop your glipizide today , start jardiance 10mg tab--1 daily in AM, and 15mg. pioglitazone once daily any time of the day .       1. Insulin is your body's fat storage hormone. When you eat meals high in carbohydrates your pancreas releases insulin to store the excess food energy as fat. By intermittent fasting, or eating all of your daily food in an 8 hour period while fasting the remaining 16, you reduce the amount of insulin your body is releasing, resulting in less storage of food energy as fat. Focusing on a diet as low in carbohydrate as you can tolerate will also help reduce the amount of insulin your pancreas releases. Low carbohydrate diet + intermittent fasting will result in weight loss and improved blood sugars with fewer medications!     2. When you feel hungry in your fasting window, try drinking a zero calorie liquid, like water,coffee, tea or diet soda  first. If you still feel hungry, try eating a low carbohydrate snack like pickles.      3. Check out [employer](join free portion of website) or \"The Diabetes Code\" by Dr. Bruce Stokes for more details.      fyi--your on immediate release metformin which can cause loose stools --I can change you to ER or extended release version of this drug when you run out of your current bottle.     It was great to speak with you today.  I value your experience and would be very thankful for your time with providing feedback on " our clinic survey. You may receive a survey via email or text message in the next few days.     Next MTM visit:  11-25-19 at 11:05am --please bring blood sugar meter to the visit.     To schedule another MTM appointment, please call the clinic directly or you may call the MTM scheduling line at [phone] or toll-free at [phone].     My Clinical Pharmacist's contact information:                                                      It was a pleasure talking with you today!  Please feel free to contact me with any questions or concerns you have.      Gena Bergeron Rph.  Medication Therapy Management Provider  [phone]

## 2019-11-04 ENCOUNTER — HEALTH MAINTENANCE LETTER (OUTPATIENT)
Age: 59
End: 2019-11-04

## 2019-11-22 NOTE — PROGRESS NOTES
SUBJECTIVE/OBJECTIVE:                           Susan Dietrich is a 59 year old female coming in for a follow-up (10-28-19) visit for Medication Therapy Management.  She was referred to me from Concha Lucas  .        Chief Complaint:  Here for f/up bs review post jardiance and pioglitazone start .     Under a lot of stress but new plan is working.           Personal Healthcare Goals: lose weight and control DM.    Allergies/ADRs: Reviewed in Epic  Tobacco: History of tobacco dependence - quit 30+ years ago.   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   PMH: Reviewed in Epic; no gall bladder.     Medication Adherence/Access:  no issues reported    Diabetes:  Pt currently taking : metformin 500mg -2 tabs bid ,pioglitazone 15mg./day + jardiance 10mg./day .  Pt is experiencing the following side effects: loose stools 1-2 x week.  SMBG: one-two time daily.   Ranges (patient glucometer):      7 days =166, 14 days =158 , 30 days =173      Date FBG/ 2hours post Lunch/2hours post Dinner /2hours post    11-25-19 170 903am      11-24-19 168 10;12am      11-22-19 164 806am      11-21-19 136 421am  219 6pm    11-20-19 155 427am      11-19 149 427am      11-18 125 9;15am        She is doing better eating in daily 8 hour window. Controlling carbs and drinking lots more non calorie liquids.           7 days avg. =240 , 14 days =253 , 30 days 248.     Date FBG/ 2hours post Lunch/2hours post Dinner /2hours post    10-28-19 137 754am      10-27 176 808am  501 6;10pm - banana bread     10-25 192 724am      10-24    191 3;27am    10-23 234  3;30am       10-22 248 328am      10-21 283  8 AM                 7 days avg. =211,  30 days =218, 14 days =209.     Date FBG/ 2hours post Lunch/2hours post Dinner /2hours post    9-23-19 188 803am      9-22   209 806pm    9-20 186 9;19am      9-18    259 3;30am   9-14 256 6;56am      9-13 186 802am      9-12    180 3;26am                  7 days avg. =260, 14 days =261 30 days =246.    Date FBG/ 2hours post Lunch/2hours post Dinner /2hours post     8-26 204  818am      8-23 288 820am      8-21 294 720am      8-20 261 854am      8-18 258 8-18                        Patient is not experiencing hypoglycemia  Recent symptoms of high blood sugar? fatigue  Eye exam: due  Foot exam: up to date  ACEi/ARB: Yes: Lisinopril.   Urine Albumin:   Lab Results   Component Value Date    UMALCR 12.90 10/18/2019      Aspirin: Taking 325mg daily and denies side effects--she feels pcp told her to take full dose asa daily.  Diet/Exercise;  Not much exercise past month , she is trying her best on the low carb IF diet . Striving for daily walking!      previously this was her diet:  : addicted to sweets , doesn't like veggies   B-fast - takes prevalite with peach joey juice qam, eats a belvita bar, then lunch - leftovers night before or laughing cow cheese and crackers or a JJ sandwich or nothing , supper - english muffin and egg if she ate something for lunch , cooks supper- pock chops , potatoes, spaghetti, rice, chicken a lot , hamburger helper.  Meal prepping is a chore.  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    Snacks late night - 3 oreo cookies most nights. Some sweet snacks during day or potato chips , jodie corn , trying to avoid high fructose. Candy not an issue like the muffins etc.     Drinks milk and water.       Her 8 hour eating window is 11-7 or 12-8pm. She is doing her best with IF diet but states lots of stress eating in her life!    Not exercising but wants to get back to it  Has all equipment at home -- nordic track and bike at home , weights.       Lab Results   Component Value Date    A1C 11.9 10/18/2019    A1C 9.9 06/22/2019    A1C 9.6 01/21/2019    A1C 9.4 11/21/2018    A1C 8.7 07/17/2018             Ha's-topiramate .      Hypertension: Current medications include : lisinopril 5mg./day.  Patient does not self-monitor BP.  Patient reports  "no current medication side effects.  BP Readings from Last 3 Encounters:   11/25/19 110/78   10/28/19 126/82   10/18/19 108/74         Hyperlipidemia: Current therapy includes : Atorvastatin 10mg once daily.  Pt reports no significant myalgias or other side effects.  Recent Labs   Lab Test 01/21/19  0850 07/17/18  0956  08/24/15  0902  11/21/14  0808   CHOL 134 129   < > 138  --  126   HDL 46* 46*   < > 44*  --  54   LDL 59 54   < > 69   < > 50   TRIG 145 144   < > 123  --  111   CHOLHDLRATIO  --   --   --  3.1  --  2.3    < > = values in this interval not displayed.           BP Readings from Last 1 Encounters:   11/25/19 110/78     Pulse Readings from Last 1 Encounters:   11/25/19 81     Wt Readings from Last 1 Encounters:   11/25/19 188 lb 3.2 oz (85.4 kg)     Ht Readings from Last 1 Encounters:   10/18/19 5' 5\" (1.651 m)     Estimated body mass index is 31.32 kg/m  as calculated from the following:    Height as of 10/18/19: 5' 5\" (1.651 m).    Weight as of this encounter: 188 lb 3.2 oz (85.4 kg).    Temp Readings from Last 1 Encounters:   10/18/19 98.3  F (36.8  C) (Oral)                 ASSESSMENT:                             Current medications were reviewed today as discussed above.     Medication Adherence: excellent, no issues identified    Diabetes: Improving but still Needs Improvement. Patient is not meeting A1c goal of < 7%. Self monitoring of blood glucose is not at goal of fasting  mg/dL and post prandial < 150 mg/dL. Pt would benefit from minimum SMBG: Check blood sugars fasting, and occasionally 2 hours after starting a meal.FYI: we discussed mando cgm --but her ins. Will not cover it -bummer!    Metformin :  stay on the same dose.  Consider ER tabs when current bottle runs out to avoid loose stools.   SGLT-2(Jardiance):  Stay on daily 10mg. Dose.    TZD:  Sty on 15mg pioglitazone qday .  Increased exercise-walk daily .   Increase in home glucose monitoring--see plan for details.  Weight loss " recommended--long re-educational session of what is IF and low carb diet -she is doing this 90% of the time --just needs to start walking daily.      Aspirin therapy is safe and appropriate. Aspirin therapy is indicated in this patient at dose of 81mg daily -unsure why she is on 325mg ./day ? Will discuss with pcp.    Hypertension: Stable. Patient is meeting BP goal of < 140/90mmHg.     Hyperlipidemia: Stable. Pt is on moderate intensity statin which is indicated based on 2013 ACC/AHA guidelines for lipid management.          PLAN:                          1. FYi--Weight today 188.1lbs --congrats 20lbs. Weight loss on my diet plan and new diabetes medications working well--blood sugars are dramatically improved !!     Keep going as best you can on the current plan!    It was great to speak with you today.  I value your experience and would be very thankful for your time with providing feedback on our clinic survey. You may receive a survey via email or text message in the next few days.     Next MTM visit:  See me next for A1c - January 27th-2020 at 9;20am  --please bring blood sugar meter to the visit.       I spent 30 minutes with this patient today. All changes were made via collaborative practice agreement with Concha Lucas  . A copy of the visit note was provided to the patient's primary care provider.        The patient was given a summary of these recommendations as an after visit summary.     Gena Bergeron Rp.  Medication Therapy Management Provider  [phone]

## 2019-11-22 NOTE — TELEPHONE ENCOUNTER
"Requested Prescriptions   Pending Prescriptions Disp Refills     TRUE METRIX BLOOD GLUCOSE TEST test strip [Pharmacy Med Name: TRUE METRIX TEST STRIPS]    Last Written Prescription Date:  12/17/15  Last Fill Quantity: 3 box,  # refills: 3   Last office visit: 10/18/2019 with prescribing provider:  Lance     Future Office Visit:   Next 5 appointments (look out 90 days)    Nov 25, 2019 11:05 AM CST  Pharmacist visit with Gena Bergeron RPH, CR EXAM ROOM 36  [facility] [id number] [place] [id number]  [phone]           3     Sig: USE TO TEST BLOOD SUGARS ONCE DAILY OR USE AS DIRECTED       Diabetic Supplies Protocol Passed - 11/22/2019 10:35 AM        Passed - Medication is active on med list        Passed - Patient is 18 years of age or older        Passed - Recent (6 mo) or future (30 days) visit within the authorizing provider's specialty     Patient had office visit in the last 6 months or has a visit in the next 30 days with authorizing provider.  See \"Patient Info\" tab in inbasket, or \"Choose Columns\" in Meds & Orders section of the refill encounter.            "

## 2019-11-25 ENCOUNTER — OFFICE VISIT (OUTPATIENT)
Dept: PHARMACY | Facility: CLINIC | Age: 59
End: 2019-11-25
Payer: COMMERCIAL

## 2019-11-25 VITALS
OXYGEN SATURATION: 95 % | WEIGHT: 188.2 LBS | HEART RATE: 81 BPM | DIASTOLIC BLOOD PRESSURE: 78 MMHG | SYSTOLIC BLOOD PRESSURE: 110 MMHG | BODY MASS INDEX: 31.32 KG/M2

## 2019-11-25 DIAGNOSIS — E78.5 HYPERLIPIDEMIA LDL GOAL <100: ICD-10-CM

## 2019-11-25 DIAGNOSIS — I10 HTN, GOAL BELOW 140/90: ICD-10-CM

## 2019-11-25 PROCEDURE — 99607 MTMS BY PHARM ADDL 15 MIN: CPT | Performed by: PHARMACIST

## 2019-11-25 PROCEDURE — 99606 MTMS BY PHARM EST 15 MIN: CPT | Performed by: PHARMACIST

## 2019-11-25 RX ORDER — PIOGLITAZONEHYDROCHLORIDE 15 MG/1
15 TABLET ORAL DAILY
Qty: 90 TABLET | Refills: 1 | Status: SHIPPED | OUTPATIENT
Start: 2019-11-25 | End: 2020-04-10

## 2019-11-25 RX ORDER — CALCIUM CITRATE/VITAMIN D3 200MG-6.25
TABLET ORAL
Qty: 100 EACH | Refills: 3 | Status: SHIPPED | OUTPATIENT
Start: 2019-11-25 | End: 2020-12-11

## 2019-11-25 NOTE — TELEPHONE ENCOUNTER
Prescription approved per [facility] Refill Protocol  Bhargavi Mosqueda RN BS

## 2019-11-25 NOTE — Clinical Note
Concha--very happy to report Jardiance + low dose gloria and her metformin + my diet plan is really starting to work now --weight 188 today and blood sugars 1t 166 avg.   I will recheck lipids and a1c end of jan-2020 --but im thinking we will see great results.Whitney

## 2019-11-25 NOTE — PATIENT INSTRUCTIONS
Recommendations from today's MTM visit:                                                      1. FYi--Weight today 188.1lbs --congrats 20lbs. Weight loss on my diet plan and new diabetes medications working well--blood sugars are dramatically improved !!     Keep going as best you can on the current plan!    It was great to speak with you today.  I value your experience and would be very thankful for your time with providing feedback on our clinic survey. You may receive a survey via email or text message in the next few days.     Next MTM visit:  See me next for A1c - January 27th-2020 at 9;20am  --please bring blood sugar meter to the visit.     To schedule another MTM appointment, please call the clinic directly or you may call the MTM scheduling line at [phone] or toll-free at [phone].     My Clinical Pharmacist's contact information:                                                      It was a pleasure talking with you today!  Please feel free to contact me with any questions or concerns you have.      Gena Bergeron Rph.  Medication Therapy Management Provider  [phone]

## 2019-12-06 ENCOUNTER — MYC MEDICAL ADVICE (OUTPATIENT)
Dept: PHARMACY | Facility: CLINIC | Age: 59
End: 2019-12-06

## 2019-12-06 NOTE — TELEPHONE ENCOUNTER
12-6-19    Hi Dr. Avery,    My pharmacist has had no luck with [facility] assistance.  He called the original number, sat on hold for 20 minutes only for the gal to tell him she can't help him when he only wanted to get to the pharmacy.  She would not help him.    I finally gave him the direct line to the pharmacy and they said there was no coupon code to use for jardiance.  He has since been told from the [facility] pharmacy that when you filled the prescription I was charged a $15 copay for a 30-day prescription.  That is not what my pocket book says.      My prescription ran out on Thanksgiving, so I haven't taken it for over a week now.    My pharmacist's name is Sreedhar Mckinney, [facility], [phone], if there is something you can do to assist him.  He is going to research more online through [employer] to see if he can find something to help.    Thanks - Radha bo [facility] with kay[facility] we have e-voucher that brings her copay down to 15$ for 90 tabs.    Will [facility] this one at our [facility].    Gena Bergeron [facility].  Medication Therapy Management Provider  [phone]

## 2019-12-23 RX ORDER — LISINOPRIL 5 MG/1
5 TABLET ORAL DAILY
Qty: 90 TABLET | Refills: 1 | Status: SHIPPED | OUTPATIENT
Start: 2019-12-23 | End: 2020-06-16

## 2019-12-23 NOTE — TELEPHONE ENCOUNTER
Prescription approved per [facility] Refill Protocol.  Hilary Lowry RN

## 2020-01-03 DIAGNOSIS — E66.01 MORBID OBESITY DUE TO EXCESS CALORIES (H): Chronic | ICD-10-CM

## 2020-01-04 NOTE — TELEPHONE ENCOUNTER
Routing refill request to provider for review/approval because:  Labs not current:  CBC  Jl Dey RN, BSN

## 2020-01-07 RX ORDER — TOPIRAMATE 100 MG/1
TABLET, FILM COATED ORAL
Qty: 60 TABLET | Refills: 5 | Status: SHIPPED | OUTPATIENT
Start: 2020-01-07 | End: 2020-07-21

## 2020-01-22 DIAGNOSIS — F33.1 MAJOR DEPRESSIVE DISORDER, RECURRENT EPISODE, MODERATE (H): ICD-10-CM

## 2020-01-22 NOTE — TELEPHONE ENCOUNTER
PHQ-9 SCORE 3/9/2018 9/20/2018 6/22/2019   PHQ-9 Total Score - - -   PHQ-9 Total Score MyChart 9 (Mild depression) - -   PHQ-9 Total Score 9 9 11     VINITA-7 SCORE 9/20/2018 4/17/2019 6/22/2019   Total Score - - -   Total Score 9 2 11     Last office visit: 10/18/2019 with prescribing provider:     Future Office Visit:   Next 5 appointments (look out 90 days)    Jan 27, 2020  9:30 AM CST  (Arrive by 9:20 AM)  Pharmacist visit with Gena Bergeron RPH  [facility] ([facility] [address] [facility] [id number] [phone]         Patient due for updated PHQ-9 and VINITA-7.  Swapferitt message sent to patient.    Hilary Lowry RN

## 2020-01-23 NOTE — PROGRESS NOTES
SUBJECTIVE/OBJECTIVE:                           Susan Dietrich is a 59 year old female coming in for a follow-up (11-25-19) visit for Medication Therapy Management.  She was referred to me from Concha Lcuas  .        Chief Complaint:  Here - A1c lab recheck.   Acute low back pain now . Taking aleve.         Personal Healthcare Goals: lose weight and control DM.    Allergies/ADRs: Reviewed in Epic  Tobacco: History of tobacco dependence - quit 30+ years ago.   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   PMH: Reviewed in Epic; no gall bladder.     Medication Adherence/Access:  no issues reported    Diabetes:  Pt currently taking : metformin 500mg -2 tabs bid ,pioglitazone 15mg./day + jardiance 10mg./day .  Pt is experiencing the following side effects: loose stools 1-2 x week.  SMBG: one-two time daily.   Ranges (patient glucometer):      7 days = 109 , 14 days =123, 30- days =143.         Date FBG/ 2hours post Lunch/2hours post Dinner /2hours post    1-27-20 107 845am      1-25 113 425am      1-24-20 106 8;44am      1-18-20 164 8am      1-17 126 939am      1-16-20 127 427am      1-15 131 425am            7 days =166, 14 days =158 , 30 days =173      Date FBG/ 2hours post Lunch/2hours post Dinner /2hours post    11-25-19 170 903am      11-24-19 168 10;12am      11-22-19 164 806am      11-21-19 136 421am  219 6pm    11-20-19 155 427am      11-19 149 427am      11-18 125 9;15am        She is doing better eating in daily 8 hour window. Controlling carbs and drinking lots more non calorie liquids.           7 days avg. =240 , 14 days =253 , 30 days 248.     Date FBG/ 2hours post Lunch/2hours post Dinner /2hours post    10-28-19 137 754am      10-27 176 808am  501 6;10pm - banana bread     10-25 192 724am      10-24    191 3;27am    10-23 234  3;30am       10-22 248 328am      10-21 283  8 AM                 7 days avg. =211,  30 days =218, 14 days  =209.     Date FBG/ 2hours post Lunch/2hours post Dinner /2hours post    9-23-19 188 803am      9-22   209 806pm    9-20 186 9;19am      9-18    259 3;30am   9-14 256 6;56am      9-13 186 802am      9-12    180 3;26am         Patient is not experiencing hypoglycemia  Recent symptoms of high blood sugar? fatigue  Eye exam: due  Foot exam: up to date  ACEi/ARB: Yes: Lisinopril.   Urine Albumin:   Lab Results   Component Value Date    UMALCR 12.90 10/18/2019      Aspirin: Taking 325mg daily and denies side effects--she feels pcp told her to take full dose asa daily.  Diet/Exercise;  More ellipitcal exercise last 2 weeks. Still struggling with sweets.     previously this was her diet:  : addicted to sweets , doesn't like veggies   B-fast - takes prevalite with peach joey juice qam, eats a belvita bar, then lunch - leftovers night before or laughing cow cheese and crackers or a JJ sandwich or nothing , supper - english muffin and egg if she ate something for lunch , cooks supper- pock chops , potatoes, spaghetti, rice, chicken a lot , hamburger helper.  Meal prepping is a chore.  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    Snacks late night - 3 oreo cookies most nights. Some sweet snacks during day or potato chips , jodie corn , trying to avoid high fructose. Candy not an issue like the muffins etc.     Drinks milk and water.       Her 8 hour eating window is 11-7 or 12-8pm. She is doing her best with IF diet but states lots of stress eating in her life!    Last 2 weeks --restarted exercising but wants to get back to it  Has all equipment at home -- nordic track and bike at home , weights.         Lab Results   Component Value Date    A1C 9.4 01/27/2020    A1C 11.9 10/18/2019    A1C 9.9 06/22/2019    A1C 9.6 01/21/2019    A1C 9.4 11/21/2018           Ha's-topiramate .      Hypertension: Current medications include : lisinopril 5mg./day.  Patient does not self-monitor BP.  Patient reports no current medication side effects.  BP  "Readings from Last 3 Encounters:   01/27/20 118/72   11/25/19 110/78   10/28/19 126/82         Hyperlipidemia: Current therapy includes : Atorvastatin 10mg once daily.  Pt reports no significant myalgias or other side effects.  Recent Labs   Lab Test 01/21/19  0850 07/17/18  0956  08/24/15  0902  11/21/14  0808   CHOL 134 129   < > 138  --  126   HDL 46* 46*   < > 44*  --  54   LDL 59 54   < > 69   < > 50   TRIG 145 144   < > 123  --  111   CHOLHDLRATIO  --   --   --  3.1  --  2.3    < > = values in this interval not displayed.       Low-back pain: new onset --unsure what happened?  She is taking prnb aleve --is this ok ?           BP Readings from Last 1 Encounters:   01/27/20 118/72     Pulse Readings from Last 1 Encounters:   01/27/20 75     Wt Readings from Last 1 Encounters:   01/27/20 185 lb 9.6 oz (84.2 kg)     Ht Readings from Last 1 Encounters:   10/18/19 5' 5\" (1.651 m)     Estimated body mass index is 30.89 kg/m  as calculated from the following:    Height as of 10/18/19: 5' 5\" (1.651 m).    Weight as of this encounter: 185 lb 9.6 oz (84.2 kg).    Temp Readings from Last 1 Encounters:   10/18/19 98.3  F (36.8  C) (Oral)                 ASSESSMENT:                             Current medications were reviewed today as discussed above.     Medication Adherence: excellent, no issues identified    Diabetes: Improving but still Needs Improvement. Patient is not meeting A1c goal of < 7%. Self monitoring of blood glucose is not at goal of fasting  mg/dL and post prandial < 150 mg/dL. Pt would benefit from minimum SMBG: Check blood sugars fasting, and occasionally 2 hours after starting a meal.FYI: we discussed mando cgm --but her ins. Will not cover it -bummer!  Metformin :  stay on the same dose.  Consider ER tabs to help reduce loose stools , try prn otc imodium as well.  SGLT-2(Jardiance):  Stay on daily 10mg. Dose.  TZD:  Increase daily dose to 30mg./day .  Increased exercise-strive for 30 minutes /day " on elliptical.   Increase in home glucose monitoring--see plan for details.  Weight loss recommended--she is slowly losing weight --encouraged less snacking on sweets --she will work on this.      Aspirin therapy is safe and appropriate. Aspirin therapy is indicated in this patient at dose of 81mg daily -unsure why she is on 325mg ./day ? Will discuss with pcp.    Hypertension: Stable. Patient is meeting BP goal of < 140/90mmHg.     Hyperlipidemia: Stable. Pt is on moderate intensity statin which is indicated based on 2013 ACC/AHA guidelines for lipid management.      Low-back pain:  Needs improvement --use prn tylenol arthritis -- save aleve for severe pain , do not lift heavy things or lift with legs if necessary.      PLAN:                          1. A1c today = 9.4% --much improved from 11.9%.    Keep working on reducing sweets in the diet , lets increase your pioglitazone dose from 15mg to 30mg.     Keep all other meds the same .    Daily elliptical for 30 minutes .         Twice a week--try to check a 2 hour post meal blood sugar if you can.   Weight 185.6lbs --down 23 lbs since last fall--keep up great work !    2. FYI--for acute low back pain --tylenol arthritis 1-3 x day , save aleve for severe back pain.  Keep exercising -elliptical daily , careful lifting heavy items--use your legs.     3. FYI--try fiddling with some imodium over the counter to help with loose stools.       It was great to speak with you today.  I value your experience and would be very thankful for your time with providing feedback on our clinic survey. You may receive a survey via email or text message in the next few days.     Next MTM visit:  See me next -April 27th -2020 at 9;20am --with blood sugar meter.   Consider ER Metformin in April , also consider Soliqua trial.       I spent 30 minutes with this patient today. All changes were made via collaborative practice agreement with Concha Lucas  . A copy of the visit note  was provided to the patient's primary care provider.        The patient was given a summary of these recommendations as an after visit summary.     Gena Bergeron Rph.  Medication Therapy Management Provider  [phone]

## 2020-01-24 RX ORDER — BUPROPION HYDROCHLORIDE 150 MG/1
TABLET ORAL
Qty: 90 TABLET | Refills: 0 | Status: SHIPPED | OUTPATIENT
Start: 2020-01-24 | End: 2020-04-10

## 2020-01-24 NOTE — TELEPHONE ENCOUNTER
PHQ-9 SCORE 9/20/2018 6/22/2019 1/24/2020   PHQ-9 Total Score - - -   PHQ-9 Total Score MyChart - - 7 (Mild depression)   PHQ-9 Total Score 9 11 7     VINITA-7 SCORE 4/17/2019 6/22/2019 1/24/2020   Total Score - - -   Total Score - - 7 (mild anxiety)   Total Score 2 11 7     Routing refill request to provider for review/approval because:  PHQ-9 > 4.  Hilary Lowry RN

## 2020-01-27 ENCOUNTER — OFFICE VISIT (OUTPATIENT)
Dept: PHARMACY | Facility: CLINIC | Age: 60
End: 2020-01-27
Payer: COMMERCIAL

## 2020-01-27 VITALS
DIASTOLIC BLOOD PRESSURE: 72 MMHG | HEART RATE: 75 BPM | OXYGEN SATURATION: 97 % | BODY MASS INDEX: 30.89 KG/M2 | SYSTOLIC BLOOD PRESSURE: 118 MMHG | WEIGHT: 185.6 LBS

## 2020-01-27 DIAGNOSIS — I10 HTN, GOAL BELOW 140/90: ICD-10-CM

## 2020-01-27 DIAGNOSIS — M54.50 ACUTE BILATERAL LOW BACK PAIN WITHOUT SCIATICA: ICD-10-CM

## 2020-01-27 DIAGNOSIS — E78.5 HYPERLIPIDEMIA LDL GOAL <100: ICD-10-CM

## 2020-01-27 LAB — HBA1C MFR BLD: 9.4 % (ref 0–5.6)

## 2020-01-27 PROCEDURE — 99605 MTMS BY PHARM NP 15 MIN: CPT | Performed by: PHARMACIST

## 2020-01-27 PROCEDURE — 36415 COLL VENOUS BLD VENIPUNCTURE: CPT | Performed by: FAMILY MEDICINE

## 2020-01-27 PROCEDURE — 99607 MTMS BY PHARM ADDL 15 MIN: CPT | Performed by: PHARMACIST

## 2020-01-27 PROCEDURE — 83036 HEMOGLOBIN GLYCOSYLATED A1C: CPT | Performed by: FAMILY MEDICINE

## 2020-01-27 RX ORDER — PIOGLITAZONEHYDROCHLORIDE 30 MG/1
30 TABLET ORAL DAILY
Qty: 90 TABLET | Refills: 1 | Status: SHIPPED | OUTPATIENT
Start: 2020-01-27 | End: 2020-08-19

## 2020-01-27 NOTE — PATIENT INSTRUCTIONS
Recommendations from today's MTM visit:                                                      1. A1c today = 9.4% --much improved from 11.9%.    Keep working on reducing sweets in the diet , lets increase your pioglitazone dose from 15mg to 30mg.     Keep all other meds the same .    Daily elliptical for 30 minutes .         Twice a week--try to check a 2 hour post meal blood sugar if you can.   Weight 185.6lbs --down 23 lbs since last fall--keep up great work !    2. FYI--for acute low back pain --tylenol arthritis 1-3 x day , save aleve for severe back pain.  Keep exercising -elliptical daily , careful lifting heavy items--use your legs.     3. FYI--try fiddling with some imodium over the counter to help with loose stools.       It was great to speak with you today.  I value your experience and would be very thankful for your time with providing feedback on our clinic survey. You may receive a survey via email or text message in the next few days.     Next MTM visit:  See me next -April 27th -2020 at 9;20am --with blood sugar meter.     To schedule another MTM appointment, please call the clinic directly or you may call the MTM scheduling line at [phone] or toll-free at [phone].     My Clinical Pharmacist's contact information:                                                      It was a pleasure talking with you today!  Please feel free to contact me with any questions or concerns you have.      Gena Bergeron [facility].  Medication Therapy Management Provider  [phone]

## 2020-03-09 ENCOUNTER — MYC MEDICAL ADVICE (OUTPATIENT)
Dept: PHARMACY | Facility: CLINIC | Age: 60
End: 2020-03-09

## 2020-03-09 NOTE — TELEPHONE ENCOUNTER
3-9-2020    Hi Dr. Bergeron - I am almost out of Jardiance and wondered if you could get me a refill?     The info on the bottle is:  Rx# [id number]. It was a 90 prescription and a $25 Copay.  I can pick it up at the pharmacy at [facility].  Just need to know when it is ready.      Thanks - Radha Dietrich        [facility]needs [facility] --will send today --they will call her when its ready to go.    Gena Bergeron [facility].  Medication Therapy Management Provider  [phone]

## 2020-04-10 ENCOUNTER — VIRTUAL VISIT (OUTPATIENT)
Dept: FAMILY MEDICINE | Facility: CLINIC | Age: 60
End: 2020-04-10
Payer: COMMERCIAL

## 2020-04-10 VITALS — BODY MASS INDEX: 30.45 KG/M2 | WEIGHT: 183 LBS

## 2020-04-10 DIAGNOSIS — E78.5 HYPERLIPIDEMIA LDL GOAL <100: ICD-10-CM

## 2020-04-10 DIAGNOSIS — F33.1 MAJOR DEPRESSIVE DISORDER, RECURRENT EPISODE, MODERATE (H): ICD-10-CM

## 2020-04-10 DIAGNOSIS — I10 HTN, GOAL BELOW 140/90: ICD-10-CM

## 2020-04-10 PROCEDURE — 99214 OFFICE O/P EST MOD 30 MIN: CPT | Mod: TEL | Performed by: FAMILY MEDICINE

## 2020-04-10 RX ORDER — BUPROPION HYDROCHLORIDE 150 MG/1
150 TABLET ORAL EVERY MORNING
Qty: 90 TABLET | Refills: 1 | Status: SHIPPED | OUTPATIENT
Start: 2020-04-10 | End: 2020-10-14

## 2020-04-10 NOTE — PROGRESS NOTES
"Subjective     Susan Dietrich is a 59 year old female who is being evaluated via a billable telephone visit.      The patient has been notified of following:     \"This telephone visit will be conducted via a call between you and your physician/provider. We have found that certain health care needs can be provided without the need for a physical exam.  This service lets us provide the care you need with a short phone conversation.  If a prescription is necessary we can send it directly to your pharmacy.  If lab work is needed we can place an order for that and you can then stop by our lab to have the test done at a later time.    Telephone visits are billed at different rates depending on your insurance coverage. During this emergency period, for some insurers they may be billed the same as an in-person visit.  Please reach out to your insurance provider with any questions.    If during the course of the call the physician/provider feels a telephone visit is not appropriate, you will not be charged for this service.\"    Patient has given verbal consent for Telephone visit?  Yes    How would you like to obtain your AVS? MyChart    Susan Dietrich complains of   Chief Complaint   Patient presents with     Diabetes     Consult     cold hands frequent ever since lost weight        ALLERGIES  Dulaglutide     Diabetes Follow-up    How often are you checking your blood sugar? Two times daily  Blood sugar testing frequency justification:  Patient modifying lifestyle changes (diet, exercise) with blood sugars  What time of day are you checking your blood sugars (select all that apply)?  Before meals  Have you had any blood sugars above 200?  Yes when checking after eating right away  Have you had any blood sugars below 70?  No    What symptoms do you notice when your blood sugar is low?  Blurred vision    What concerns do you have today about your diabetes?  Other: hands are cold at times tingling sensation frequent since " loosing about 20-30lbs     Do you have any of these symptoms? (Select all that apply)  No numbness or tingling in feet.  No redness, sores or blisters on feet.  No complaints of excessive thirst.  No reports of blurry vision.  No significant changes to weight.    Has lost 20-25# so far, eating better, only eating in a period of time, is also avoiding over eating, still has a sweet tooth. 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    Checks sugars in , normally 120's in the morning. Can be 130-140 if over eating the night before.  Occasionally is 90 in the morning, but rare.   Will call for eye appt in the fall. Needs new glasses anyway, scratched lenses, but no blurry vision.      Hyperlipidemia Follow-Up      Are you regularly taking any medication or supplement to lower your cholesterol?   Yes- Lipitor    Are you having muscle aches or other side effects that you think could be caused by your cholesterol lowering medication?  No   LDL Cholesterol Calculated   Date Value Ref Range Status   01/21/2019 60 <100 mg/dL Final     Comment:     Desirable:       <100 mg/dl   07/17/2018 55 <100 mg/dL Final     Comment:     Desirable:       <100 mg/dl           Hypertension Follow-up      Do you check your blood pressure regularly outside of the clinic? No     Are you following a low salt diet? Yes-no added salt    Are your blood pressures ever more than 140 on the top number (systolic) OR more   than 90 on the bottom number (diastolic), for example 140/90? No   lisinipril 5mg , really for kidney protection, pt has lost weight and has not had BP issues in awhile, has no cuff.    BP Readings from Last 2 Encounters:   01/27/20 118/72   11/25/19 110/78     Hemoglobin A1C (%)   Date Value   01/27/2020 9.4 (H)   10/18/2019 11.9 (H)     LDL Cholesterol Calculated (mg/dL)   Date Value   01/21/2019 59   07/17/2018 54         How many servings of fruits and vegetables do you eat daily?   0-1    On average, how many sweetened beverages do you drink each day (Examples: soda, juice, sweet tea, etc.  Do NOT count diet or artificially sweetened beverages)?   0-maybe 2/week    How many days per week do you exercise enough to make your heart beat faster? none    How many minutes a day do you exercise enough to make your heart beat faster? none  How many days per week do you miss taking your medication? Very rare    What makes it hard for you to take your medications?  remembering to take      Recent Labs   Lab Test 01/27/20  0945 10/18/19  0856 06/22/19  0909 01/21/19  0850  07/17/18  0956 03/09/18  0921 12/15/17  0845   A1C 9.4* 11.9* 9.9* 9.6*   < > 8.7* 7.8* 8.5*   LDL  --   --   --  59  --  54 42  --    HDL  --   --   --  46*  --  46* 47*  --    TRIG  --   --   --  145  --  144 135  --    ALT  --  41 38  --   --  46  --  38   CR  --  0.61 0.65  --   --  0.72  --  0.73   GFRESTIMATED  --  >90 >90  --   --  84  --  82   GFRESTBLACK  --  >90 >90  --   --  >90  --  >90   POTASSIUM  --  3.9 4.1  --   --  4.1  --  4.1   TSH  --   --   --   --   --  1.34  --  1.41    < > = values in this interval not displayed.      BP Readings from Last 3 Encounters:   01/27/20 118/72   11/25/19 110/78   10/28/19 126/82    Wt Readings from Last 3 Encounters:   04/10/20 83 kg (183 lb)   01/27/20 84.2 kg (185 lb 9.6 oz)   11/25/19 85.4 kg (188 lb 3.2 oz)                Mood is doing ok. Needs wellbutrin refilled. Doing well considering, likes to be home more, so this is good.    Cold hands, easily at work and at home, but not at night.     Reviewed and updated as needed this visit by Provider  Meds         Review of Systems   ROS COMP: Constitutional, HEENT, cardiovascular, pulmonary, gi and gu systems are negative, except as otherwise noted. No fever/chills, minimal allergies-not bad, no change in IBS,        Objective   Reported vitals:  Wt 83 kg (183 lb)   LMP 10/03/2010   BMI 30.45 kg/m   weight is reported per  patient  alert and no distress  Psych: Alert and oriented times 3; coherent speech, normal   rate and volume, able to articulate logical thoughts, able   to abstract reason, no tangential thoughts, no hallucinations   or delusions  Her affect is normal     Diagnostic Test Results:  Labs reviewed in Epic        Assessment/Plan:  1. Type 2 diabetes, uncontrolled, with neuropathy (H)  Doing well, numbers seem much better. Recheck a1c after July, keep same regimen for now    2. Major depressive disorder, recurrent episode, moderate (H)  Doing well  - buPROPion (WELLBUTRIN XL) 150 MG 24 hr tablet; Take 1 tablet (150 mg) by mouth every morning  Dispense: 90 tablet; Refill: 1    3. Hyperlipidemia LDL goal <100  Cont same, numbners are great on low dose lipitor    4. HTN, goal below 140/90  Well controlled, cont low dose lisinopril     Cold hands, pt with hx of PVD-recheck  In the f    Return in about 6 months (around 10/10/2020) for wellness exam, diabetes check, fasting labs.      Phone call duration:  21 minutes    Concha Lucas MD

## 2020-04-16 DIAGNOSIS — E78.5 HYPERLIPIDEMIA LDL GOAL <100: ICD-10-CM

## 2020-04-16 NOTE — TELEPHONE ENCOUNTER
Routing refill request to provider for review/approval because:  Drug not on the [facility] refill protocol   Jl Dey RN, BSN

## 2020-04-17 RX ORDER — CHOLESTYRAMINE 4 G/5.5G
POWDER, FOR SUSPENSION ORAL
Qty: 231 G | Refills: 3 | Status: SHIPPED | OUTPATIENT
Start: 2020-04-17 | End: 2021-07-01

## 2020-04-26 ENCOUNTER — MYC MEDICAL ADVICE (OUTPATIENT)
Dept: PHARMACY | Facility: CLINIC | Age: 60
End: 2020-04-26

## 2020-04-26 NOTE — TELEPHONE ENCOUNTER
4-26-20    I received an appointment reminder for Monday, April 27 however I was called and told you were out of the office the week of April 27 and that I was to reschedule for this appointment and the next available dates were scheduling into June and that was for a virtual appointment.  I will not be at tomorrow's appointment due to the clinic taking me off your appointment calendar.       I have not reschedule since a virtual appointment doesn't really help in checking my weight and reading my meter.       Thank you - Radha bo will encourage bs meter f/up via phone in may if she is interested.    Gena Bergeron Rph.  Medication Therapy Management Provider  [phone]

## 2020-06-11 ENCOUNTER — MYC REFILL (OUTPATIENT)
Dept: PHARMACY | Facility: CLINIC | Age: 60
End: 2020-06-11

## 2020-06-11 NOTE — TELEPHONE ENCOUNTER
6-      mtm [facility]--she found rx coupon --jardiance will go thru no charge and be ready for her to  tomorrow from 9-5 pm.    Pt.. notified via my chart .    Gena Bergeron [facility].  Medication Therapy Management Provider  [phone]

## 2020-06-11 NOTE — TELEPHONE ENCOUNTER
Jardiance refilled on 3/20/20 for #90 + 1 RF. Refill not due at this time. Patient informed via [employer].     Agueda Cochran, Pharm.D.  Medication Therapy Management Pharmacist  Phone: [phone]

## 2020-06-15 DIAGNOSIS — E78.5 HYPERLIPIDEMIA LDL GOAL <100: ICD-10-CM

## 2020-06-16 ENCOUNTER — TELEPHONE (OUTPATIENT)
Dept: FAMILY MEDICINE | Facility: CLINIC | Age: 60
End: 2020-06-16

## 2020-06-16 RX ORDER — LISINOPRIL 5 MG/1
5 TABLET ORAL DAILY
Qty: 90 TABLET | Refills: 0 | Status: SHIPPED | OUTPATIENT
Start: 2020-06-16 | End: 2020-09-11

## 2020-06-16 RX ORDER — ATORVASTATIN CALCIUM 10 MG/1
TABLET, FILM COATED ORAL
Qty: 90 TABLET | Refills: 2 | Status: SHIPPED | OUTPATIENT
Start: 2020-06-16 | End: 2020-12-11

## 2020-06-16 NOTE — TELEPHONE ENCOUNTER
Prescription approved per [facility] Refill Protocol.    Aidan CHANG RN, BSN

## 2020-06-16 NOTE — TELEPHONE ENCOUNTER
Routing refill request to provider for review/approval because:  Labs not current:  JUNG CHANG RN, BSN

## 2020-06-29 DIAGNOSIS — E78.5 HYPERLIPIDEMIA LDL GOAL <100: ICD-10-CM

## 2020-06-29 LAB
CHOLEST SERPL-MCNC: 124 MG/DL
HBA1C MFR BLD: 8 % (ref 0–5.6)
HDLC SERPL-MCNC: 71 MG/DL
LDLC SERPL CALC-MCNC: 37 MG/DL
NONHDLC SERPL-MCNC: 53 MG/DL
TRIGL SERPL-MCNC: 81 MG/DL

## 2020-06-29 PROCEDURE — 80061 LIPID PANEL: CPT | Performed by: FAMILY MEDICINE

## 2020-06-29 PROCEDURE — 83036 HEMOGLOBIN GLYCOSYLATED A1C: CPT | Performed by: FAMILY MEDICINE

## 2020-06-29 PROCEDURE — 36415 COLL VENOUS BLD VENIPUNCTURE: CPT | Performed by: FAMILY MEDICINE

## 2020-07-20 DIAGNOSIS — E66.01 MORBID OBESITY DUE TO EXCESS CALORIES (H): Chronic | ICD-10-CM

## 2020-07-21 ENCOUNTER — TELEPHONE (OUTPATIENT)
Dept: FAMILY MEDICINE | Facility: CLINIC | Age: 60
End: 2020-07-21

## 2020-07-21 RX ORDER — TOPIRAMATE 100 MG/1
TABLET, FILM COATED ORAL
Qty: 60 TABLET | Refills: 4 | Status: SHIPPED | OUTPATIENT
Start: 2020-07-21 | End: 2020-12-11

## 2020-07-21 NOTE — TELEPHONE ENCOUNTER
Panel Management Review      Patient has the following on her problem list:     Depression / Dysthymia review    Measure:  Needs PHQ-9 score of 4 or less during index window.  Administer PHQ-9 and if score is 5 or more, send encounter to provider for next steps.    5 - 7 month window range: PHQ-9 due NOW    PHQ-9 SCORE 9/20/2018 6/22/2019 1/24/2020   PHQ-9 Total Score - - -   PHQ-9 Total Score MyChart - - 7 (Mild depression)   PHQ-9 Total Score 9 11 7       If PHQ-9 recheck is 5 or more, route to provider for next steps.    Patient is due for:  PHQ9    Diabetes    ASA: Passed    Last A1C  Lab Results   Component Value Date    A1C 8.0 06/29/2020    A1C 9.4 01/27/2020    A1C 11.9 10/18/2019    A1C 9.9 06/22/2019    A1C 9.6 01/21/2019     A1C tested: FAILED    Last LDL:    Lab Results   Component Value Date    CHOL 124 06/29/2020     Lab Results   Component Value Date    HDL 71 06/29/2020     Lab Results   Component Value Date    LDL 37 06/29/2020     Lab Results   Component Value Date    TRIG 81 06/29/2020     Lab Results   Component Value Date    CHOLHDLRATIO 3.1 08/24/2015     Lab Results   Component Value Date    NHDL 53 06/29/2020       Is the patient on a Statin? YES             Is the patient on Aspirin? YES    Medications     HMG CoA Reductase Inhibitors     atorvastatin (LIPITOR) 10 MG tablet       Salicylates     aspirin 325 MG EC tablet             Last three blood pressure readings:  BP Readings from Last 3 Encounters:   01/27/20 118/72   11/25/19 110/78   10/28/19 126/82       Date of last diabetes office visit: 04/10/2020     Tobacco History:     History   Smoking Status     Former Smoker     Packs/day: 1.00     Years: 5.00     Types: Cigarettes     Quit date: 1/4/1985   Smokeless Tobacco     Never Used     Comment: quit in 1985 smoked one pack a day         Hypertension   Last three blood pressure readings:  BP Readings from Last 3 Encounters:   01/27/20 118/72   11/25/19 110/78   10/28/19 126/82      Blood pressure: Passed    HTN Guidelines:  Less than 140/90      Composite cancer screening  Chart review shows that this patient is due/due soon for the following Mammogram  Summary:    Patient is due/failing the following:   CMP, MAMMOGRAM and PHQ9    Action needed:   Patient needs to do PHQ9.    Type of outreach:    Sent [employer] message.    Questions for provider review:    None                                                                                                                                    Lisa Magill, CMA       Chart routed to Care Team .

## 2020-07-21 NOTE — TELEPHONE ENCOUNTER
Routing refill request to provider for review/approval because:  Labs out of range:  CBC  Labs not current:  CBC  Jl Dey RN, BSN

## 2020-07-31 NOTE — TELEPHONE ENCOUNTER
Patient has viewed the MY CHART message regarding the health maintenance that is due.  Lisa Magill, CMA

## 2020-08-19 RX ORDER — PIOGLITAZONEHYDROCHLORIDE 30 MG/1
TABLET ORAL
Qty: 90 TABLET | Refills: 0 | Status: SHIPPED | OUTPATIENT
Start: 2020-08-19 | End: 2020-11-18

## 2020-08-24 ENCOUNTER — OFFICE VISIT (OUTPATIENT)
Dept: FAMILY MEDICINE | Facility: CLINIC | Age: 60
End: 2020-08-24
Payer: COMMERCIAL

## 2020-08-24 ENCOUNTER — ANCILLARY PROCEDURE (OUTPATIENT)
Dept: GENERAL RADIOLOGY | Facility: CLINIC | Age: 60
End: 2020-08-24
Attending: FAMILY MEDICINE
Payer: COMMERCIAL

## 2020-08-24 VITALS
TEMPERATURE: 98.1 F | SYSTOLIC BLOOD PRESSURE: 108 MMHG | RESPIRATION RATE: 20 BRPM | OXYGEN SATURATION: 99 % | DIASTOLIC BLOOD PRESSURE: 78 MMHG | HEART RATE: 80 BPM

## 2020-08-24 DIAGNOSIS — R07.81 RIB PAIN ON LEFT SIDE: ICD-10-CM

## 2020-08-24 DIAGNOSIS — R19.06 EPIGASTRIC LUMP: ICD-10-CM

## 2020-08-24 DIAGNOSIS — R07.81 RIB PAIN ON LEFT SIDE: Primary | ICD-10-CM

## 2020-08-24 PROCEDURE — 71101 X-RAY EXAM UNILAT RIBS/CHEST: CPT | Mod: LT

## 2020-08-24 PROCEDURE — 99214 OFFICE O/P EST MOD 30 MIN: CPT | Performed by: FAMILY MEDICINE

## 2020-08-24 NOTE — PROGRESS NOTES
Subjective     Susan Dietrich is a 60 year old female who presents to clinic today for the following health issues:    Mass            possible rib problem, injured on Saturday 08/22/2020, c/o leftsided rib pain that radiates down left arm    Per patient she was putting a railing up on a porch and as she was leaning on her arm she heard something crack and felt it in her chest.   Is now having a difficult time taking in deep breaths. Also notes pain with yawning, twisting and turning.   Pain is located under her left breast and seems to radiate out.     Also noticed a bump on her middle chest just underneath her sternum for the last few months. Admits it is painful to touch.   Denies any associated symptoms       Review of Systems   Constitutional, HEENT, cardiovascular, pulmonary, GI, , musculoskeletal, neuro, skin, endocrine and psych systems are negative, except as otherwise noted.      Objective    /78 (BP Location: Right arm, Patient Position: Chair, Cuff Size: Adult Large)   Pulse 80   Temp 98.1  F (36.7  C) (Oral)   Resp 20   LMP 10/03/2010   SpO2 99%   Breastfeeding No   There is no height or weight on file to calculate BMI.  Physical Exam   GENERAL: healthy, alert and no distress  RESP: lungs clear to auscultation - no rales, rhonchi or wheezes  CV: regular rate and rhythm, normal S1 S2, no S3 or S4, no murmur, click or rub, no peripheral edema   CHEST WALL: left ribs- TTP over lateral ribs 5-8, no bruising   ABDOMEN: tenderness epigastric with palpable mass and bowel sounds normal  PSYCH: mentation appears normal, anxious and fatigued    XRAY- Ribs and chest- left side- no obvious fracture noted        Assessment & Plan     Rib pain on left side  - xray unremarkable for obvious fracture. Will await official read. At this time will treat like a rib contusion. Supportive care discussed with patient.   - XR Ribs & Chest Left G/E 3 Views; Future    Epigastric lump  - unclear etiology- will  investigate further with ultrasound   - US Abdomen Limited; Future          See Patient Instructions    Return in about 2 weeks (around 9/7/2020) for rib pain .    Juen Jordan MD  [facility]      Future Appointments   Date Time Provider Department Center   8/24/2020 10:50 AM June Jordan MD [facility]     Appointment Notes for this encounter:   Rib pain/Bump between sternum -per [place] mychart notes    Health Maintenance Due   Topic Date Due     ANNUAL REVIEW OF HM ORDERS  1960     HEPATITIS B IMMUNIZATION (1 of 3 - Risk 3-dose series) 06/06/1980     MAMMO SCREENING  03/07/2019     PREVENTIVE CARE VISIT  09/20/2019     CMP  04/18/2020     PHQ-9  07/22/2020     EYE EXAM  09/06/2020     Health Maintenance addressed:  PHQ9    PHQ9 / VINITA / ACT Gave pt questionnaire to complete   Scheduled a mammo    MyChart Status:  Active and Using

## 2020-08-24 NOTE — PATIENT INSTRUCTIONS
[facility] Scheduling  Phone:[phone]  Toll free: [phone]  Patient Education     Rib Contusion or Minor Fracture    A rib contusion is a bruise to one or more rib bones. It may cause pain, tenderness, swelling, and a purplish color to the skin. There may be a sharp pain with each breath. A rib contusion takes anywhere from a few days to a few weeks to heal. A minor rib fracture or break may cause the same symptoms as a rib contusion. The small crack may not be seen on a regular chest X-ray. Treatment for both problems is basically the same.  Home care    You may use over-the-counter pain medicine to control pain, unless another pain medicine was prescribed. If you have chronic liver or kidney disease or ever had a stomach ulcer or GI (gastrointestinal) bleeding, talk with your healthcare provider before using these medicines.    Rest. Don't lift anything heavy or do any activity that causes pain.    Apply an ice pack over the injured area for 15 to 20 minutes every 1 to 2 hours. You should do this for the first 24 to 48 hours. To make an ice pack, put ice cubes in a plastic bag that seals at the top. Wrap the bag in a clean, thin towel or cloth. Never put ice or an ice pack directly on the skin. Continue with ice packs as needed for the relief of pain and swelling.    The first 3 to 4 weeks of healing will be the most painful. If your pain is not under control with the treatment given, call your healthcare provider. Sometimes a stronger pain medicine may be needed. A nerve block can be done in case of severe pain. It will numb the nerve between the ribs.  Follow-up care  Follow up with your healthcare provider, or as advised.  If X-rays were taken, you will be told of any new findings that may affect your care.  Call 911  Call 911 if you have:    Dizziness, weakness or fainting    Shortness of breath with or without chest discomfort    New or worsening pain  When to seek medical advice  Call your  healthcare provider right away if any of these occur:    Fever of 100.4 F (38 C) or higher, or as directed by your healthcare provider    Chills    Stomach pain, vomiting  Date Last Reviewed: 6/1/2018 2000-2019 [employer]. [address] [place] [address]. All rights reserved. This information is not intended as a substitute for professional medical care. Always follow your healthcare professional's instructions.

## 2020-08-25 ASSESSMENT — ANXIETY QUESTIONNAIRES
7. FEELING AFRAID AS IF SOMETHING AWFUL MIGHT HAPPEN: SEVERAL DAYS
2. NOT BEING ABLE TO STOP OR CONTROL WORRYING: SEVERAL DAYS
IF YOU CHECKED OFF ANY PROBLEMS ON THIS QUESTIONNAIRE, HOW DIFFICULT HAVE THESE PROBLEMS MADE IT FOR YOU TO DO YOUR WORK, TAKE CARE OF THINGS AT HOME, OR GET ALONG WITH OTHER PEOPLE: SOMEWHAT DIFFICULT
6. BECOMING EASILY ANNOYED OR IRRITABLE: NEARLY EVERY DAY
3. WORRYING TOO MUCH ABOUT DIFFERENT THINGS: SEVERAL DAYS
5. BEING SO RESTLESS THAT IT IS HARD TO SIT STILL: NEARLY EVERY DAY
1. FEELING NERVOUS, ANXIOUS, OR ON EDGE: MORE THAN HALF THE DAYS
GAD7 TOTAL SCORE: 14

## 2020-08-25 ASSESSMENT — PATIENT HEALTH QUESTIONNAIRE - PHQ9
SUM OF ALL RESPONSES TO PHQ QUESTIONS 1-9: 9
5. POOR APPETITE OR OVEREATING: NEARLY EVERY DAY

## 2020-08-26 ASSESSMENT — ANXIETY QUESTIONNAIRES: GAD7 TOTAL SCORE: 14

## 2020-09-01 ENCOUNTER — HOSPITAL ENCOUNTER (OUTPATIENT)
Dept: ULTRASOUND IMAGING | Facility: CLINIC | Age: 60
Discharge: HOME | End: 2020-09-01
Attending: FAMILY MEDICINE | Admitting: FAMILY MEDICINE
Payer: COMMERCIAL

## 2020-09-01 DIAGNOSIS — R19.06 EPIGASTRIC LUMP: ICD-10-CM

## 2020-09-01 PROCEDURE — 76705 ECHO EXAM OF ABDOMEN: CPT

## 2020-09-02 RX ORDER — EMPAGLIFLOZIN 10 MG/1
TABLET, FILM COATED ORAL
Qty: 90 TABLET | Refills: 0 | Status: SHIPPED | OUTPATIENT
Start: 2020-09-02 | End: 2020-12-11

## 2020-09-02 NOTE — TELEPHONE ENCOUNTER
Thyroid blood work is normal   Continue the same levothyroxine 88 mcg daily  Follow up in 1 year with blood work  sarah sent to schedule appointment      Franchesca Godinez/

## 2020-09-02 NOTE — TELEPHONE ENCOUNTER
Medication is being filled for 1 time refill only due to:  Patient needs to be seen because due for 6 month follow up.     Please call and assist in scheduling.    Aidan CHANG RN, BSN

## 2020-09-02 NOTE — LETTER
[facility]  [address]  [facility] [address]  [phone]  September 4, 2020    Susan Dietrich  [address] [place] [id number]      Dear Susan,    I care about your health and have reviewed your health plan.  I have reviewed your medical conditions, medication list, and lab results and am making recommendations  based on this review, to better manage your health.    You are particularly in need of attention regarding:  -Diabetes and -Wellness (Physical) Visit    I am recommending that you:  -schedule a WELLNESS (Physical) APPOINTMENT with me.     Here is a list of Health Maintenance topics that are due now or due soon:  Health Maintenance Due   Topic Date Due     ANNUAL REVIEW OF HM ORDERS  1960     HEPATITIS B IMMUNIZATION (1 of 3 - Risk 3-dose series) 06/06/1980     MAMMO SCREENING  03/07/2019     PREVENTIVE CARE VISIT  09/20/2019     CMP  04/18/2020     INFLUENZA VACCINE (1) 09/01/2020     EYE EXAM  09/06/2020       Please call us at [phone] (or use [employer]) to address the above   recommendations.    Thank you for trusting [facility] and we appreciate the opportunity to serve you.  We look forward to supporting your healthcare needs in the future.    Healthy Regards,    Concha Lucas MD

## 2020-09-11 RX ORDER — LISINOPRIL 5 MG/1
5 TABLET ORAL DAILY
Qty: 90 TABLET | Refills: 0 | Status: SHIPPED | OUTPATIENT
Start: 2020-09-11 | End: 2020-12-11

## 2020-09-11 NOTE — TELEPHONE ENCOUNTER
Prescription approved per [facility] Refill Protocol.    Denisa Pitt RN

## 2020-10-14 DIAGNOSIS — F33.1 MAJOR DEPRESSIVE DISORDER, RECURRENT EPISODE, MODERATE (H): ICD-10-CM

## 2020-10-14 RX ORDER — BUPROPION HYDROCHLORIDE 150 MG/1
TABLET ORAL
Qty: 90 TABLET | Refills: 0 | Status: SHIPPED | OUTPATIENT
Start: 2020-10-14 | End: 2020-12-11

## 2020-10-14 NOTE — TELEPHONE ENCOUNTER
Prescription approved per [facility] Refill Protocol.  Anabella Louis RN, BSN  Message handled by CLINIC NURSE.

## 2020-10-19 ENCOUNTER — ANCILLARY PROCEDURE (OUTPATIENT)
Dept: MAMMOGRAPHY | Facility: CLINIC | Age: 60
End: 2020-10-19
Payer: COMMERCIAL

## 2020-10-19 ENCOUNTER — ALLIED HEALTH/NURSE VISIT (OUTPATIENT)
Dept: FAMILY MEDICINE | Facility: CLINIC | Age: 60
End: 2020-10-19
Payer: COMMERCIAL

## 2020-10-19 DIAGNOSIS — Z23 NEED FOR PROPHYLACTIC VACCINATION AND INOCULATION AGAINST INFLUENZA: Primary | ICD-10-CM

## 2020-10-19 DIAGNOSIS — Z12.31 VISIT FOR SCREENING MAMMOGRAM: ICD-10-CM

## 2020-10-19 PROCEDURE — 90682 RIV4 VACC RECOMBINANT DNA IM: CPT

## 2020-10-19 PROCEDURE — 77063 BREAST TOMOSYNTHESIS BI: CPT | Performed by: RADIOLOGY

## 2020-10-19 PROCEDURE — 90471 IMMUNIZATION ADMIN: CPT

## 2020-10-19 PROCEDURE — 99207 PR NO CHARGE NURSE ONLY: CPT

## 2020-10-19 PROCEDURE — 77067 SCR MAMMO BI INCL CAD: CPT | Performed by: RADIOLOGY

## 2020-11-16 ENCOUNTER — HEALTH MAINTENANCE LETTER (OUTPATIENT)
Age: 60
End: 2020-11-16

## 2020-11-17 NOTE — LETTER
[address], [address]  [facility] [id number] [phone]          November 23, 2020    Susan Dietrich                                                                                                                     [address] [place] [id number]            Dear Susan,  We have made several attempts to contact you.  You will need to call the clinic at [phone] to schedule an appointment prior to any further refills of your medication.       Sincerely,         Concha Lucas MD

## 2020-11-18 RX ORDER — PIOGLITAZONEHYDROCHLORIDE 30 MG/1
TABLET ORAL
Qty: 30 TABLET | Refills: 0 | Status: SHIPPED | OUTPATIENT
Start: 2020-11-18 | End: 2020-12-11

## 2020-11-18 NOTE — TELEPHONE ENCOUNTER
Routing refill request to provider for review/approval because:  Labs not current:  Last done Oct. 2019

## 2020-12-04 ENCOUNTER — DOCUMENTATION ONLY (OUTPATIENT)
Dept: LAB | Facility: CLINIC | Age: 60
End: 2020-12-04

## 2020-12-04 DIAGNOSIS — E78.5 HYPERLIPIDEMIA LDL GOAL <100: Primary | ICD-10-CM

## 2020-12-04 NOTE — PROGRESS NOTES
..Susan CHRISTOPHER Dietrich has an upcoming lab appointment:    Future Appointments   Date Time Provider Department Center   12/7/2020  8:45 AM CR LAB [facility]   12/11/2020  9:20 AM Concha Lucas MD [facility]      Please review Health Maintenance and sign order: Review of Health Maintenance Protocol Orders (HMPO) to authorize patient's due Health Maintenance labs to be drawn.    Health Maintenance Due   Topic     ANNUAL REVIEW OF HM ORDERS      CMP      A1C      MICROALBUMIN      LIPID      Lai Callejas

## 2020-12-07 LAB
ALBUMIN SERPL-MCNC: 3.5 G/DL (ref 3.4–5)
ALP SERPL-CCNC: 79 U/L (ref 40–150)
ALT SERPL W P-5'-P-CCNC: 20 U/L (ref 0–50)
ANION GAP SERPL CALCULATED.3IONS-SCNC: 4 MMOL/L (ref 3–14)
AST SERPL W P-5'-P-CCNC: 12 U/L (ref 0–45)
BILIRUB SERPL-MCNC: 0.7 MG/DL (ref 0.2–1.3)
BUN SERPL-MCNC: 22 MG/DL (ref 7–30)
CALCIUM SERPL-MCNC: 9.1 MG/DL (ref 8.5–10.1)
CHLORIDE SERPL-SCNC: 112 MMOL/L (ref 94–109)
CO2 SERPL-SCNC: 23 MMOL/L (ref 20–32)
CREAT SERPL-MCNC: 0.86 MG/DL (ref 0.52–1.04)
CREAT UR-MCNC: 101 MG/DL
GFR SERPL CREATININE-BSD FRML MDRD: 74 ML/MIN/{1.73_M2}
GLUCOSE SERPL-MCNC: 135 MG/DL (ref 70–99)
HBA1C MFR BLD: 7.6 % (ref 0–5.6)
MICROALBUMIN UR-MCNC: 10 MG/L
MICROALBUMIN/CREAT UR: 9.62 MG/G CR (ref 0–25)
POTASSIUM SERPL-SCNC: 4.2 MMOL/L (ref 3.4–5.3)
PROT SERPL-MCNC: 7.6 G/DL (ref 6.8–8.8)
SODIUM SERPL-SCNC: 139 MMOL/L (ref 133–144)
TSH SERPL DL<=0.005 MIU/L-ACNC: 1.74 MU/L (ref 0.4–4)

## 2020-12-07 PROCEDURE — 82043 UR ALBUMIN QUANTITATIVE: CPT | Performed by: FAMILY MEDICINE

## 2020-12-07 PROCEDURE — 84443 ASSAY THYROID STIM HORMONE: CPT | Performed by: FAMILY MEDICINE

## 2020-12-07 PROCEDURE — 80053 COMPREHEN METABOLIC PANEL: CPT | Performed by: FAMILY MEDICINE

## 2020-12-07 PROCEDURE — 36415 COLL VENOUS BLD VENIPUNCTURE: CPT | Performed by: FAMILY MEDICINE

## 2020-12-07 PROCEDURE — 83036 HEMOGLOBIN GLYCOSYLATED A1C: CPT | Performed by: FAMILY MEDICINE

## 2020-12-10 NOTE — PROGRESS NOTES
"Susan Dietrich is a 60 year old female who is being evaluated via a billable video visit.      The patient has been notified of following:     \"This video visit will be conducted via a call between you and your physician/provider. We have found that certain health care needs can be provided without the need for an in-person physical exam.  This service lets us provide the care you need with a video conversation.  If a prescription is necessary we can send it directly to your pharmacy.  If lab work is needed we can place an order for that and you can then stop by our lab to have the test done at a later time.    Video visits are billed at different rates depending on your insurance coverage.  Please reach out to your insurance provider with any questions.    If during the course of the call the physician/provider feels a video visit is not appropriate, you will not be charged for this service.\"    Patient has given verbal consent for Video visit? {YES-NO  Default Yes:4444::\"Yes\"}  How would you like to obtain your AVS? {AVS Preference:966366}  If you are dropped from the video visit, the video invite should be resent to: {video visit invitation:413125}  Will anyone else be joining your video visit? {:272864}  {If patient encounters technical issues they should call [phone] :531493}    Subjective     Susan Dietrich is a 60 year old female who presents today via video visit for the following health issues:    HPI     Diabetes Follow-up      How often are you checking your blood sugar? { :624161}    What concerns do you have today about your diabetes? { :615750::\"None\"}     Do you have any of these symptoms? (Select all that apply)  { :222947}    Have you had a diabetic eye exam in the last 12 months? { :044219}        {Reference  Diabetes Management Resources :438119}    {Reference  Diabetes Log - 7 days :292751}    Hyperlipidemia Follow-Up      Are you regularly taking any medication or supplement to lower " "your cholesterol?   { :040735}    Are you having muscle aches or other side effects that you think could be caused by your cholesterol lowering medication?  { :153168}    Hypertension Follow-up      Do you check your blood pressure regularly outside of the clinic? { :477648}     Are you following a low salt diet? { :041967}    Are your blood pressures ever more than 140 on the top number (systolic) OR more   than 90 on the bottom number (diastolic), for example 140/90? { :426826}    BP Readings from Last 2 Encounters:   08/24/20 108/78   01/27/20 118/72     Hemoglobin A1C (%)   Date Value   12/07/2020 7.6 (H)   06/29/2020 8.0 (H)     LDL Cholesterol Calculated (mg/dL)   Date Value   06/29/2020 37   01/21/2019 59         How many servings of fruits and vegetables do you eat daily?  { :961576}    On average, how many sweetened beverages do you drink each day (Examples: soda, juice, sweet tea, etc.  Do NOT count diet or artificially sweetened beverages)?   { 1-11:556917}    How many days per week do you exercise enough to make your heart beat faster? { :981020}    How many minutes a day do you exercise enough to make your heart beat faster? { :506900}    How many days per week do you miss taking your medication? {0-7 :919742}      Video Start Time: {video visit start/end time for provider to select:495845}    {additonal problems for provider to add (Optional):655692}    Review of Systems   {ROS COMP (Optional):334895}      Objective           Vitals:  No vitals were obtained today due to virtual visit.    Physical Exam     {video visit exam brief selected:671581::\"GENERAL: Healthy, alert and no distress\",\"EYES: Eyes grossly normal to inspection.  No discharge or erythema, or obvious scleral/conjunctival abnormalities.\",\"RESP: No audible wheeze, cough, or visible cyanosis.  No visible retractions or increased work of breathing.  \",\"SKIN: Visible skin clear. No significant rash, abnormal pigmentation or lesions.\",\"NEURO: " "Cranial nerves grossly intact.  Mentation and speech appropriate for age.\",\"PSYCH: Mentation appears normal, affect normal/bright, judgement and insight intact, normal speech and appearance well-groomed.\"}      {Diagnostic Test Results (Optional):291940}        {PROVIDER CHARTING PREFERENCE:963049}      Video-Visit Details    Type of service:  Video Visit    Video End Time:{video visit start/end time for provider to select:839252}    Originating Location (pt. Location): {video visit patient location:618684::\"Home\"}    Distant Location (provider location):  [facility]     Platform used for Video Visit: {Virtual Visit Platforms:558112::\"AQH\"}        "

## 2020-12-11 ENCOUNTER — VIRTUAL VISIT (OUTPATIENT)
Dept: FAMILY MEDICINE | Facility: CLINIC | Age: 60
End: 2020-12-11
Payer: COMMERCIAL

## 2020-12-11 DIAGNOSIS — F33.1 MAJOR DEPRESSIVE DISORDER, RECURRENT EPISODE, MODERATE (H): ICD-10-CM

## 2020-12-11 DIAGNOSIS — K58.0 IRRITABLE BOWEL SYNDROME WITH DIARRHEA: Primary | ICD-10-CM

## 2020-12-11 DIAGNOSIS — E78.5 HYPERLIPIDEMIA LDL GOAL <100: ICD-10-CM

## 2020-12-11 DIAGNOSIS — E66.01 MORBID OBESITY DUE TO EXCESS CALORIES (H): Chronic | ICD-10-CM

## 2020-12-11 DIAGNOSIS — I10 HTN, GOAL BELOW 140/90: ICD-10-CM

## 2020-12-11 PROCEDURE — 99214 OFFICE O/P EST MOD 30 MIN: CPT | Mod: GT | Performed by: FAMILY MEDICINE

## 2020-12-11 RX ORDER — BUPROPION HYDROCHLORIDE 150 MG/1
150 TABLET ORAL EVERY MORNING
Qty: 90 TABLET | Refills: 1 | Status: SHIPPED | OUTPATIENT
Start: 2020-12-11 | End: 2021-07-01

## 2020-12-11 RX ORDER — NEBULIZER AND COMPRESSOR
1 EACH MISCELLANEOUS DAILY
Qty: 1 KIT | Refills: 0 | Status: SHIPPED | OUTPATIENT
Start: 2020-12-11 | End: 2023-02-27

## 2020-12-11 RX ORDER — GLUCOSAM/CHON-MSM1/C/MANG/BOSW 500-416.6
1 TABLET ORAL 3 TIMES DAILY
Qty: 300 EACH | Refills: 3 | Status: SHIPPED | OUTPATIENT
Start: 2020-12-11 | End: 2021-12-15

## 2020-12-11 RX ORDER — CALCIUM CITRATE/VITAMIN D3 200MG-6.25
1 TABLET ORAL DAILY
Qty: 300 EACH | Refills: 3 | Status: SHIPPED | OUTPATIENT
Start: 2020-12-11 | End: 2021-12-13

## 2020-12-11 RX ORDER — TOPIRAMATE 100 MG/1
100 TABLET, FILM COATED ORAL 2 TIMES DAILY
Qty: 180 TABLET | Refills: 1 | Status: SHIPPED | OUTPATIENT
Start: 2020-12-11 | End: 2021-06-09

## 2020-12-11 RX ORDER — PIOGLITAZONEHYDROCHLORIDE 30 MG/1
30 TABLET ORAL DAILY
Qty: 90 TABLET | Refills: 1 | Status: SHIPPED | OUTPATIENT
Start: 2020-12-11 | End: 2021-06-09

## 2020-12-11 RX ORDER — LISINOPRIL 5 MG/1
5 TABLET ORAL DAILY
Qty: 90 TABLET | Refills: 1 | Status: SHIPPED | OUTPATIENT
Start: 2020-12-11 | End: 2021-06-09

## 2020-12-11 RX ORDER — ATORVASTATIN CALCIUM 10 MG/1
10 TABLET, FILM COATED ORAL DAILY
Qty: 90 TABLET | Refills: 3 | Status: SHIPPED | OUTPATIENT
Start: 2020-12-11 | End: 2021-12-15

## 2020-12-11 ASSESSMENT — ANXIETY QUESTIONNAIRES
2. NOT BEING ABLE TO STOP OR CONTROL WORRYING: MORE THAN HALF THE DAYS
3. WORRYING TOO MUCH ABOUT DIFFERENT THINGS: MORE THAN HALF THE DAYS
IF YOU CHECKED OFF ANY PROBLEMS ON THIS QUESTIONNAIRE, HOW DIFFICULT HAVE THESE PROBLEMS MADE IT FOR YOU TO DO YOUR WORK, TAKE CARE OF THINGS AT HOME, OR GET ALONG WITH OTHER PEOPLE: NOT DIFFICULT AT ALL
GAD7 TOTAL SCORE: 14
6. BECOMING EASILY ANNOYED OR IRRITABLE: MORE THAN HALF THE DAYS
5. BEING SO RESTLESS THAT IT IS HARD TO SIT STILL: NEARLY EVERY DAY
7. FEELING AFRAID AS IF SOMETHING AWFUL MIGHT HAPPEN: NOT AT ALL
1. FEELING NERVOUS, ANXIOUS, OR ON EDGE: MORE THAN HALF THE DAYS

## 2020-12-11 ASSESSMENT — PATIENT HEALTH QUESTIONNAIRE - PHQ9: 5. POOR APPETITE OR OVEREATING: NEARLY EVERY DAY

## 2020-12-11 NOTE — PROGRESS NOTES
"Susan Dietrich is a 60 year old female who is being evaluated via a billable video visit.      The patient has been notified of following:     \"This video visit will be conducted via a call between you and your physician/provider. We have found that certain health care needs can be provided without the need for an in-person physical exam.  This service lets us provide the care you need with a video conversation.  If a prescription is necessary we can send it directly to your pharmacy.  If lab work is needed we can place an order for that and you can then stop by our lab to have the test done at a later time.    Video visits are billed at different rates depending on your insurance coverage.  Please reach out to your insurance provider with any questions.    If during the course of the call the physician/provider feels a video visit is not appropriate, you will not be charged for this service.\"    Patient has given verbal consent for Video visit? Yes  How would you like to obtain your AVS? MyChart  If you are dropped from the video visit, the video invite should be resent to: Send to e-mail at: [email or web address]  Will anyone else be joining your video visit? No      Subjective     Susan Dietrich is a 60 year old female who presents today via video visit for the following health issues:    HPI     Diabetes Follow-up    How often are you checking your blood sugar? One time daily  What time of day are you checking your blood sugars (select all that apply)?  Every morning before breakfast   Have you had any blood sugars above 200?  Yes 1-was check after eating   Have you had any blood sugars below 70?  No    What symptoms do you notice when your blood sugar is low?  Lethargy and Blurred vision    What concerns do you have today about your diabetes? None     Do you have any of these symptoms? (Select all that apply)  Blurry vision    Have you had a diabetic eye exam in the last 12 months? " No                Hyperlipidemia Follow-Up      Are you regularly taking any medication or supplement to lower your cholesterol?   YES    Are you having muscle aches or other side effects that you think could be caused by your cholesterol lowering medication?  No    Hypertension Follow-up      Do you check your blood pressure regularly outside of the clinic? No     Are you following a low salt diet? Yes    Are your blood pressures ever more than 140 on the top number (systolic) OR more   than 90 on the bottom number (diastolic), for example 140/90? No    BP Readings from Last 2 Encounters:   20 108/78   20 118/72     Hemoglobin A1C (%)   Date Value   2020 7.6 (H)   2020 8.0 (H)     LDL Cholesterol Calculated (mg/dL)   Date Value   2020 37   2019 59       Depression Followup    How are you doing with your depression since your last visit? No change    Are you having other symptoms that might be associated with depression? No    Have you had a significant life event?  OTHER: Did some realestate      Are you feeling anxious or having panic attacks?   Yes:  anxious    Do you have any concerns with your use of alcohol or other drugs? No    Social History     Tobacco Use     Smoking status: Former Smoker     Packs/day: 1.00     Years: 5.00     Pack years: 5.00     Types: Cigarettes     Quit date: 1985     Years since quittin.9     Smokeless tobacco: Never Used     Tobacco comment: quit in  smoked one pack a day   Substance Use Topics     Alcohol use: Yes     Comment: Minimal     Drug use: No     PHQ 2020   PHQ-9 Total Score 7 9 -   Q9: Thoughts of better off dead/self-harm past 2 weeks Not at all Not at all (No Data)     VINITA-7 SCORE 2020   Total Score - - -   Total Score 7 (mild anxiety) - -   Total Score 7 14 14           Suicide Assessment Five-step Evaluation and Treatment (SAFE-T)      How many servings of fruits and  "vegetables do you eat daily?  0-1    On average, how many sweetened beverages do you drink each day (Examples: soda, juice, sweet tea, etc.  Do NOT count diet or artificially sweetened beverages)?   0    How many days per week do you exercise enough to make your heart beat faster? 3 or less    How many minutes a day do you exercise enough to make your heart beat faster? 10 - 19    How many days per week do you miss taking your medication? 0     no pain in her legs with walking, hx of PVD, a long time ago, she will get cramps, but usually in the middle of the night. 2005 has a scan,    Video Start Time: 9:37 AM    Bathroom issues, does not feel well, often with diarrhea, does not have gallbladder, wondering if she has chrons. Not had a normal, soft stools, some days are terrible, a bathroom day, 3 hrs, and frequent, liquid for the past several months, has 1 bad day each week. Last week it was 3 days, and some weeks she does not go at all.   Review of Systems   Constitutional, HEENT, cardiovascular, pulmonary, gi and gu systems are negative, except as otherwise noted.      Objective    Vitals - Patient Reported  Weight (Patient Reported): 83.9 kg (185 lb)  Height (Patient Reported): 165.1 cm (5' 5\")  BMI (Based on Pt Reported Ht/Wt): 30.79  Temperature (Patient Reported): 89.6  F (32  C)(forehead)      Vitals:  No vitals were obtained today due to virtual visit.    Physical Exam     GENERAL: Healthy, alert and no distress  EYES: Eyes grossly normal to inspection.  No discharge or erythema, or obvious scleral/conjunctival abnormalities.  RESP: No audible wheeze, cough, or visible cyanosis.  No visible retractions or increased work of breathing.    SKIN: Visible skin clear. No significant rash, abnormal pigmentation or lesions.  NEURO: Cranial nerves grossly intact.  Mentation and speech appropriate for age.  PSYCH: Mentation appears normal, affect normal/bright, judgement and insight intact, normal speech and " appearance well-groomed.      Review labs, your a1c, CMP, CBC          Assessment & Plan     Type 2 diabetes, uncontrolled, with neuropathy (H)  Doing ok, will raise jardiance, as sugars will go up as we are stopping metformin  - lisinopril (ZESTRIL) 5 MG tablet; Take 1 tablet (5 mg) by mouth daily  - pioglitazone (ACTOS) 30 MG tablet; Take 1 tablet (30 mg) by mouth daily  - TRUEplus Lancets 33G MISC; 1 strip 3 times daily TRUETEST LANCETS to be used with the Trutest test strips.  - blood glucose (TRUE METRIX BLOOD GLUCOSE TEST) test strip; 1 strip by In Vitro route daily Use to test blood sugar 1 times daily or as directed.  - empagliflozin (JARDIANCE) 25 MG TABS tablet; Take 1 tablet (25 mg) by mouth daily    Hyperlipidemia LDL goal <100  Cont same, doing well  - atorvastatin (LIPITOR) 10 MG tablet; Take 1 tablet (10 mg) by mouth daily    Major depressive disorder, recurrent episode, moderate (H)  Doing well, cont same  - buPROPion (WELLBUTRIN XL) 150 MG 24 hr tablet; Take 1 tablet (150 mg) by mouth every morning    Type 2 diabetes, uncontrolled, with neuropathy  No concerns today  - lisinopril (ZESTRIL) 5 MG tablet; Take 1 tablet (5 mg) by mouth daily  - pioglitazone (ACTOS) 30 MG tablet; Take 1 tablet (30 mg) by mouth daily  - TRUEplus Lancets 33G MISC; 1 strip 3 times daily TRUETEST LANCETS to be used with the Trutest test strips.  - blood glucose (TRUE METRIX BLOOD GLUCOSE TEST) test strip; 1 strip by In Vitro route daily Use to test blood sugar 1 times daily or as directed.  - empagliflozin (JARDIANCE) 25 MG TABS tablet; Take 1 tablet (25 mg) by mouth daily    obesity due to excess calories (H)  Refilled, no side effects, helping with weight loss, has lost 30# so far!  - topiramate (TOPAMAX) 100 MG tablet; Take 1 tablet (100 mg) by mouth 2 times daily    Irritable bowel syndrome with diarrhea  On and off diarhea, will stop metformin, as this is a know side effects, also is due for colonoscopy, pt willing to  get that checked out  - GASTROENTEROLOGY ADULT REF CONSULT ONLY; Future    HTN, goal below 140/90  Controled, will get a cuff to monitor at home  - Blood Pressure Monitoring (ADULT BLOOD PRESSURE CUFF LG) KIT; 1 Device daily        Work on weight loss  Regular exercise    Return in about 6 months (around 6/11/2021) for Preventive Visit plus, fasting labs.    Concha Lucas MD  [facility]      Video-Visit Details    Type of service:  Video Visit    Video End Time:9:58 AM    Originating Location (pt. Location): Home    Distant Location (provider location):  [facility]     Platform used for Video Visit: Sonia

## 2020-12-12 ASSESSMENT — ANXIETY QUESTIONNAIRES: GAD7 TOTAL SCORE: 14

## 2021-02-12 ENCOUNTER — TRANSFERRED RECORDS (OUTPATIENT)
Dept: HEALTH INFORMATION MANAGEMENT | Facility: CLINIC | Age: 61
End: 2021-02-12

## 2021-02-12 LAB — RETINOPATHY: NEGATIVE

## 2021-03-29 ENCOUNTER — TELEPHONE (OUTPATIENT)
Dept: FAMILY MEDICINE | Facility: CLINIC | Age: 61
End: 2021-03-29

## 2021-03-29 NOTE — TELEPHONE ENCOUNTER
Patient Quality Outreach      Summary:    Patient has the following on her problem list/HM:   Depression / Dysthymia review    6 Month Remission: 4-8 month window range: 21-21  12 Month Remission: 10-14 month window range: 21       PHQ-9 SCORE 2019   PHQ-9 Total Score - - -   PHQ-9 Total Score MyChart - 7 (Mild depression) -   PHQ-9 Total Score 11 7 9       If PHQ-9 recheck is 5 or more, route to provider for next steps.    Diabetes    Last A1C:  Lab Results   Component Value Date    A1C 7.6 2020    A1C 8.0 2020       Last LDL:    Lab Results   Component Value Date    LDL 37 2020       Is the patient on a Statin? Yes          Is the patient on Aspirin? Yes    Medications     HMG CoA Reductase Inhibitors     atorvastatin (LIPITOR) 10 MG tablet       Salicylates     aspirin 325 MG EC tablet             Last three blood pressure readings:  BP Readings from Last 3 Encounters:   20 108/78   20 118/72   19 110/78            Tobacco Use      Smoking status: Former Smoker        Packs/day: 1.00        Years: 5.00        Pack years: 5        Types: Cigarettes        Quit date: 1985        Years since quittin.2      Smokeless tobacco: Never Used      Tobacco comment: quit in  smoked one pack a day          Hypertension   Last three blood pressure readings:  BP Readings from Last 3 Encounters:   20 108/78   20 118/72   19 110/78     Blood pressure: Passed    HTN Guidelines:  ? 139/89     Patient is due/failing the following:   A1C, LDL (Fasting) and Eye Exam, Cervical Cancer Screening - PAP Needed and PHQ-9 Needed    Type of outreach:    Sent [employer] message.    Questions for provider review:    None                                                                                                                                     Lisa Magill, CMA       Chart routed to Care Team.

## 2021-03-31 ENCOUNTER — MYC MEDICAL ADVICE (OUTPATIENT)
Dept: FAMILY MEDICINE | Facility: CLINIC | Age: 61
End: 2021-03-31

## 2021-04-04 ENCOUNTER — HEALTH MAINTENANCE LETTER (OUTPATIENT)
Age: 61
End: 2021-04-04

## 2021-04-05 NOTE — TELEPHONE ENCOUNTER
Patient has read the MY CHART message regarding the health maintenance that is due. Lisa Magill, CMA

## 2021-04-28 DIAGNOSIS — E78.5 HYPERLIPIDEMIA LDL GOAL <100: ICD-10-CM

## 2021-04-28 LAB — HBA1C MFR BLD: 8.9 % (ref 0–5.6)

## 2021-04-28 PROCEDURE — 83036 HEMOGLOBIN GLYCOSYLATED A1C: CPT | Performed by: FAMILY MEDICINE

## 2021-04-28 PROCEDURE — 36415 COLL VENOUS BLD VENIPUNCTURE: CPT | Performed by: FAMILY MEDICINE

## 2021-06-07 DIAGNOSIS — E66.01 MORBID OBESITY DUE TO EXCESS CALORIES (H): Chronic | ICD-10-CM

## 2021-06-09 RX ORDER — LISINOPRIL 5 MG/1
5 TABLET ORAL DAILY
Qty: 90 TABLET | Refills: 0 | Status: SHIPPED | OUTPATIENT
Start: 2021-06-09 | End: 2021-07-01

## 2021-06-09 RX ORDER — PIOGLITAZONEHYDROCHLORIDE 30 MG/1
TABLET ORAL
Qty: 90 TABLET | Refills: 0 | Status: SHIPPED | OUTPATIENT
Start: 2021-06-09 | End: 2021-07-01

## 2021-06-09 RX ORDER — TOPIRAMATE 100 MG/1
TABLET, FILM COATED ORAL
Qty: 180 TABLET | Refills: 0 | Status: SHIPPED | OUTPATIENT
Start: 2021-06-09 | End: 2021-07-01

## 2021-06-09 NOTE — TELEPHONE ENCOUNTER
Prescription approved per [facility] Refill Protocol. Patient has upcoming appointment 7/1/21.   Gerber CRANE RN

## 2021-06-28 ASSESSMENT — ENCOUNTER SYMPTOMS
DIARRHEA: 0
PALPITATIONS: 0
CONSTIPATION: 0
FREQUENCY: 0
NAUSEA: 0
CHILLS: 1
HEMATURIA: 0
EYE PAIN: 0
NERVOUS/ANXIOUS: 1
DIZZINESS: 0
WEAKNESS: 0
JOINT SWELLING: 0
HEADACHES: 0
PARESTHESIAS: 1
SORE THROAT: 0
MYALGIAS: 0
COUGH: 0
HEARTBURN: 0
BREAST MASS: 0
HEMATOCHEZIA: 0
ABDOMINAL PAIN: 0
FEVER: 0
SHORTNESS OF BREATH: 0
ARTHRALGIAS: 0
DYSURIA: 0

## 2021-07-01 ENCOUNTER — DOCUMENTATION ONLY (OUTPATIENT)
Dept: FAMILY MEDICINE | Facility: CLINIC | Age: 61
End: 2021-07-01

## 2021-07-01 ENCOUNTER — OFFICE VISIT (OUTPATIENT)
Dept: FAMILY MEDICINE | Facility: CLINIC | Age: 61
End: 2021-07-01
Payer: COMMERCIAL

## 2021-07-01 VITALS
RESPIRATION RATE: 16 BRPM | BODY MASS INDEX: 34.04 KG/M2 | HEIGHT: 65 IN | SYSTOLIC BLOOD PRESSURE: 104 MMHG | TEMPERATURE: 98.1 F | WEIGHT: 204.3 LBS | DIASTOLIC BLOOD PRESSURE: 68 MMHG | OXYGEN SATURATION: 98 % | HEART RATE: 79 BPM

## 2021-07-01 DIAGNOSIS — Z00.00 ROUTINE GENERAL MEDICAL EXAMINATION AT A HEALTH CARE FACILITY: Primary | ICD-10-CM

## 2021-07-01 DIAGNOSIS — I73.9 PVD (PERIPHERAL VASCULAR DISEASE) (H): ICD-10-CM

## 2021-07-01 DIAGNOSIS — F33.1 MAJOR DEPRESSIVE DISORDER, RECURRENT EPISODE, MODERATE (H): ICD-10-CM

## 2021-07-01 DIAGNOSIS — E66.01 MORBID OBESITY DUE TO EXCESS CALORIES (H): Chronic | ICD-10-CM

## 2021-07-01 DIAGNOSIS — G25.81 RESTLESS LEGS SYNDROME: ICD-10-CM

## 2021-07-01 DIAGNOSIS — Z12.4 SCREENING FOR MALIGNANT NEOPLASM OF CERVIX: ICD-10-CM

## 2021-07-01 LAB
ERYTHROCYTE [DISTWIDTH] IN BLOOD BY AUTOMATED COUNT: 14.8 % (ref 10–15)
HBA1C MFR BLD: 8.5 % (ref 0–5.6)
HCT VFR BLD AUTO: 46.8 % (ref 35–47)
HGB BLD-MCNC: 14.8 G/DL (ref 11.7–15.7)
MCH RBC QN AUTO: 29.7 PG (ref 26.5–33)
MCHC RBC AUTO-ENTMCNC: 31.6 G/DL (ref 31.5–36.5)
MCV RBC AUTO: 94 FL (ref 78–100)
PLATELET # BLD AUTO: 185 10E9/L (ref 150–450)
RBC # BLD AUTO: 4.98 10E12/L (ref 3.8–5.2)
WBC # BLD AUTO: 7.6 10E9/L (ref 4–11)

## 2021-07-01 PROCEDURE — 99207 PR FOOT EXAM NO CHARGE: CPT | Mod: 25 | Performed by: FAMILY MEDICINE

## 2021-07-01 PROCEDURE — 80061 LIPID PANEL: CPT | Performed by: FAMILY MEDICINE

## 2021-07-01 PROCEDURE — 99214 OFFICE O/P EST MOD 30 MIN: CPT | Mod: 25 | Performed by: FAMILY MEDICINE

## 2021-07-01 PROCEDURE — 83550 IRON BINDING TEST: CPT | Performed by: FAMILY MEDICINE

## 2021-07-01 PROCEDURE — 87624 HPV HI-RISK TYP POOLED RSLT: CPT | Performed by: FAMILY MEDICINE

## 2021-07-01 PROCEDURE — 99396 PREV VISIT EST AGE 40-64: CPT | Performed by: FAMILY MEDICINE

## 2021-07-01 PROCEDURE — 36415 COLL VENOUS BLD VENIPUNCTURE: CPT | Performed by: FAMILY MEDICINE

## 2021-07-01 PROCEDURE — G0145 SCR C/V CYTO,THINLAYER,RESCR: HCPCS | Performed by: FAMILY MEDICINE

## 2021-07-01 PROCEDURE — 84443 ASSAY THYROID STIM HORMONE: CPT | Performed by: FAMILY MEDICINE

## 2021-07-01 PROCEDURE — 83036 HEMOGLOBIN GLYCOSYLATED A1C: CPT | Performed by: FAMILY MEDICINE

## 2021-07-01 PROCEDURE — 80053 COMPREHEN METABOLIC PANEL: CPT | Performed by: FAMILY MEDICINE

## 2021-07-01 PROCEDURE — 85027 COMPLETE CBC AUTOMATED: CPT | Performed by: FAMILY MEDICINE

## 2021-07-01 PROCEDURE — 96127 BRIEF EMOTIONAL/BEHAV ASSMT: CPT | Performed by: FAMILY MEDICINE

## 2021-07-01 PROCEDURE — 83540 ASSAY OF IRON: CPT | Performed by: FAMILY MEDICINE

## 2021-07-01 RX ORDER — GLIMEPIRIDE 2 MG/1
2 TABLET ORAL
Qty: 90 TABLET | Refills: 0 | Status: SHIPPED | OUTPATIENT
Start: 2021-07-01 | End: 2021-12-16

## 2021-07-01 RX ORDER — BUPROPION HYDROCHLORIDE 150 MG/1
150 TABLET ORAL EVERY MORNING
Qty: 90 TABLET | Refills: 1 | Status: SHIPPED | OUTPATIENT
Start: 2021-07-01 | End: 2021-12-16

## 2021-07-01 RX ORDER — PIOGLITAZONEHYDROCHLORIDE 30 MG/1
30 TABLET ORAL DAILY
Qty: 90 TABLET | Refills: 0 | Status: SHIPPED | OUTPATIENT
Start: 2021-07-01 | End: 2021-12-16

## 2021-07-01 RX ORDER — LISINOPRIL 5 MG/1
5 TABLET ORAL DAILY
Qty: 90 TABLET | Refills: 0 | Status: SHIPPED | OUTPATIENT
Start: 2021-07-01 | End: 2021-12-15

## 2021-07-01 RX ORDER — TOPIRAMATE 100 MG/1
100 TABLET, FILM COATED ORAL 2 TIMES DAILY
Qty: 180 TABLET | Refills: 0 | Status: SHIPPED | OUTPATIENT
Start: 2021-07-01 | End: 2021-12-16

## 2021-07-01 ASSESSMENT — ENCOUNTER SYMPTOMS
HEARTBURN: 0
SORE THROAT: 0
WEAKNESS: 0
HEMATURIA: 0
DIZZINESS: 0
MYALGIAS: 0
NAUSEA: 0
PARESTHESIAS: 1
CONSTIPATION: 0
HEADACHES: 0
BREAST MASS: 0
ABDOMINAL PAIN: 0
FEVER: 0
COUGH: 0
ARTHRALGIAS: 0
JOINT SWELLING: 0
PALPITATIONS: 0
SHORTNESS OF BREATH: 0
CHILLS: 1
DYSURIA: 0
FREQUENCY: 0
DIARRHEA: 0
EYE PAIN: 0
HEMATOCHEZIA: 0
NERVOUS/ANXIOUS: 1

## 2021-07-01 ASSESSMENT — PATIENT HEALTH QUESTIONNAIRE - PHQ9
5. POOR APPETITE OR OVEREATING: NEARLY EVERY DAY
SUM OF ALL RESPONSES TO PHQ QUESTIONS 1-9: 13

## 2021-07-01 ASSESSMENT — ANXIETY QUESTIONNAIRES
2. NOT BEING ABLE TO STOP OR CONTROL WORRYING: MORE THAN HALF THE DAYS
1. FEELING NERVOUS, ANXIOUS, OR ON EDGE: MORE THAN HALF THE DAYS
IF YOU CHECKED OFF ANY PROBLEMS ON THIS QUESTIONNAIRE, HOW DIFFICULT HAVE THESE PROBLEMS MADE IT FOR YOU TO DO YOUR WORK, TAKE CARE OF THINGS AT HOME, OR GET ALONG WITH OTHER PEOPLE: SOMEWHAT DIFFICULT
6. BECOMING EASILY ANNOYED OR IRRITABLE: NEARLY EVERY DAY
GAD7 TOTAL SCORE: 18
3. WORRYING TOO MUCH ABOUT DIFFERENT THINGS: MORE THAN HALF THE DAYS
5. BEING SO RESTLESS THAT IT IS HARD TO SIT STILL: NEARLY EVERY DAY
7. FEELING AFRAID AS IF SOMETHING AWFUL MIGHT HAPPEN: NEARLY EVERY DAY

## 2021-07-01 ASSESSMENT — MIFFLIN-ST. JEOR: SCORE: 1489.64

## 2021-07-01 NOTE — PROGRESS NOTES
"   SUBJECTIVE:   CC: Susan Dietrich is an 61 year old woman who presents for preventive health visit.     Patient has been advised of split billing requirements and indicates understanding: Yes     Healthy Habits:     Getting at least 3 servings of Calcium per day:  Yes    Bi-annual eye exam:  Yes    Dental care twice a year:  Yes    Sleep apnea or symptoms of sleep apnea:  None    Diet:  Low salt    Frequency of exercise:  1 day/week    Duration of exercise:  Less than 15 minutes    Taking medications regularly:  Yes    Medication side effects:  Other    PHQ-2 Total Score: 2    Additional concerns today:  Yes       Pt has PVD, and is not exercising much, is now taking the stairs at work, which is new, she has a stationary bike at home and has been meaning to use it again. padnet done in 2009. She is complaining of cold hands, this is on and off. Warm hands today. Denies leg pains.      Diabetes Follow-up      How often are you checking your blood sugar? Not at all- life is a little difficult lately to get it done    What concerns do you have today about your diabetes? Other: sometimes feel like she is twitching. Like her \"body wants to get out of skin\"  Was taken off of Metformin in Dec.      Do you have any of these symptoms? (Select all that apply)  Excessive thirst and Blurry vision (blurry vision happens when she hasn't eaten)    Have you had a diabetic eye exam in the last 12 months? Yes- Date of last eye exam: [facility],  Location: [place]    Off metformin, due to diarrhea.this has not resolved. The jardiance was raised a little. She has gained weight and wondering if her sugars are high. needs new meter.     BP Readings from Last 2 Encounters:   07/01/21 104/68   08/24/20 108/78     Hemoglobin A1C (%)   Date Value   04/28/2021 8.9 (H)   12/07/2020 7.6 (H)     LDL Cholesterol Calculated (mg/dL)   Date Value   06/29/2020 37   01/21/2019 59         Patient complains of depressive symptoms. Onset " approximately many year(s) ago, gradually worsening since that time.   Feels it is due to stressful life, she does not call it depression  PHQ 2020   PHQ-9 Total Score 9 - 13   Q9: Thoughts of better off dead/self-harm past 2 weeks Not at all (No Data) Not at all       Current symptoms include depressed mood, hopelessness, diminished interest or pleasure in activities, insomnia, psychomotor agitation (restless and /or feeling on edge), fatigue, feelings of guilt, difficulty with concentration.       Previous treatment modalities employed include medication(s) Wellbutrin (bupropion).         Today's PHQ-2 Score:   PHQ-2 (  [employer]) 2021   Q1: Little interest or pleasure in doing things 1   Q2: Feeling down, depressed or hopeless 1   PHQ-2 Score 2   Q1: Little interest or pleasure in doing things Several days   Q2: Feeling down, depressed or hopeless Several days   PHQ-2 Score 2       Abuse: Current or Past (Physical, Sexual or Emotional) - No  Do you feel safe in your environment? Yes    Have you ever done Advance Care Planning? (For example, a Health Directive, POLST, or a discussion with a medical provider or your loved ones about your wishes): No, advance care planning information given to patient to review.  Patient declined advance care planning discussion at this time.    Social History     Tobacco Use     Smoking status: Former Smoker     Packs/day: 1.00     Years: 5.00     Pack years: 5.00     Types: Cigarettes     Quit date: 1985     Years since quittin.5     Smokeless tobacco: Never Used     Tobacco comment: quit in  smoked one pack a day   Substance Use Topics     Alcohol use: Yes     Comment: Minimal         Alcohol Use 2021   Prescreen: >3 drinks/day or >7 drinks/week? No   Prescreen: >3 drinks/day or >7 drinks/week? -       Reviewed orders with patient.  Reviewed health maintenance and updated orders accordingly - Yes  BP Readings from Last 3  Encounters:   07/01/21 104/68   08/24/20 108/78   01/27/20 118/72    Wt Readings from Last 3 Encounters:   07/01/21 92.7 kg (204 lb 4.8 oz)   04/10/20 83 kg (183 lb)   01/27/20 84.2 kg (185 lb 9.6 oz)                  Recent Labs   Lab Test 07/01/21  1115 04/28/21  1556 12/07/20  0838 06/29/20  0925 10/18/19  0856 10/18/19  0856 06/22/19  0909 01/21/19  0850 07/17/18  0956 07/17/18  0956   A1C 8.5* 8.9* 7.6* 8.0*   < > 11.9* 9.9* 9.6*   < > 8.7*   LDL  --   --   --  37  --   --   --  59  --  54   HDL  --   --   --  71  --   --   --  46*  --  46*   TRIG  --   --   --  81  --   --   --  145  --  144   ALT  --   --  20  --   --  41 38  --   --  46   CR  --   --  0.86  --   --  0.61 0.65  --   --  0.72   GFRESTIMATED  --   --  74  --   --  >90 >90  --   --  84   GFRESTBLACK  --   --  86  --   --  >90 >90  --   --  >90   POTASSIUM  --   --  4.2  --   --  3.9 4.1  --   --  4.1   TSH  --   --  1.74  --   --   --   --   --   --  1.34    < > = values in this interval not displayed.        Breast Cancer Screening:    Breast CA Risk Assessment (FHS-7) 6/28/2021   Do you have a family history of breast, colon, or ovarian cancer? No / Unknown         Mammogram Screening: Recommended mammography every 1-2 years with patient discussion and risk factor consideration  Pertinent mammograms are reviewed under the imaging tab.    History of abnormal Pap smear: NO - age 30-65 PAP every 5 years with negative HPV co-testing recommended  PAP / HPV Latest Ref Rng & Units 6/17/2016 1/24/2013 10/22/2010   PAP - NIL NIL OTHER-NIL EM>40   HPV 16 DNA NEG Negative - -   HPV 18 DNA NEG Negative - -   OTHER HR HPV NEG Negative - -     Reviewed and updated as needed this visit by clinical staff  Tobacco  Allergies  Meds   Med Hx  Surg Hx  Fam Hx  Soc Hx        Reviewed and updated as needed this visit by Provider                    Review of Systems   Constitutional: Positive for chills. Negative for fever.   HENT: Negative for congestion, ear  "pain, hearing loss and sore throat.    Eyes: Negative for pain and visual disturbance.   Respiratory: Negative for cough and shortness of breath.    Cardiovascular: Negative for chest pain, palpitations and peripheral edema.   Gastrointestinal: Negative for abdominal pain, constipation, diarrhea, heartburn, hematochezia and nausea.   Breasts:  Negative for tenderness, breast mass and discharge.   Genitourinary: Negative for dysuria, frequency, genital sores, hematuria, pelvic pain, urgency, vaginal bleeding and vaginal discharge.   Musculoskeletal: Negative for arthralgias, joint swelling and myalgias.   Skin: Negative for rash.   Neurological: Positive for paresthesias. Negative for dizziness, weakness and headaches.   Psychiatric/Behavioral: Positive for mood changes. The patient is nervous/anxious.           OBJECTIVE:   /68 (BP Location: Right arm, Patient Position: Sitting, Cuff Size: Adult Regular)   Pulse 79   Temp 98.1  F (36.7  C) (Oral)   Resp 16   Ht 1.638 m (5' 4.5\")   Wt 92.7 kg (204 lb 4.8 oz)   LMP 10/03/2010   SpO2 98%   BMI 34.53 kg/m    Physical Exam  GENERAL: healthy, alert and no distress  EYES: Eyes grossly normal to inspection, PERRL and conjunctivae and sclerae normal  HENT: ear canals and TM's normal, nose and mouth without ulcers or lesions  NECK: no adenopathy, no asymmetry, masses, or scars and thyroid normal to palpation  RESP: lungs clear to auscultation - no rales, rhonchi or wheezes  BREAST: normal without masses, tenderness or nipple discharge and no palpable axillary masses or adenopathy  CV: regular rate and rhythm, normal S1 S2, no S3 or S4, no murmur, click or rub, no peripheral edema and peripheral pulses strong  ABDOMEN: soft, nontender, no hepatosplenomegaly, no masses and bowel sounds normal  MS: no gross musculoskeletal defects noted, no edema  SKIN: no suspicious lesions or rashes, several bug bites noted on skin, bright pink with center pustule that is time " and only on a few of them, no blisters, less than 1cm  NEURO: Normal strength and tone, mentation intact and speech normal  PSYCH: mentation appears normal, affect normal/bright  Foot exam is normal, neg filament test  Diagnostic Test Results:  Labs reviewed in Epic    ASSESSMENT/PLAN:   1. Routine general medical examination at a health care facility  Normal exam today  - COMPREHENSIVE METABOLIC PANEL  - FOOT EXAM  - TSH with free T4 reflex  - CBC with platelets  - blood glucose monitoring (NO BRAND SPECIFIED) meter device kit; Use to test blood sugar 1 times daily or as directed.  Dispense: 1 kit; Refill: 0  - Lipid panel reflex to direct LDL Fasting    2. Major depressive disorder, recurrent episode, moderate (H)  Not controlled, recommend she stop wellbutrin for 3 days and see if jumpy feeling stops or gets worse. She will let me know. Pt is against starting something else for depression at this time.  - buPROPion (WELLBUTRIN XL) 150 MG 24 hr tablet; Take 1 tablet (150 mg) by mouth every morning  Dispense: 90 tablet; Refill: 1    3. Type 2 diabetes, uncontrolled, with neuropathy (H)  Refilled meds, a1c is still high. Will add glimepiride. Failed trulicity-hated this.   Recheck a1c in 3 months     - pioglitazone (ACTOS) 30 MG tablet; Take 1 tablet (30 mg) by mouth daily  Dispense: 90 tablet; Refill: 0  - empagliflozin (JARDIANCE) 25 MG TABS tablet; Take 1 tablet (25 mg) by mouth daily  Dispense: 90 tablet; Refill: 1    4. obesity due to excess calories (H)  Refilled, does not seem to be helping at this time, but patient is hesitant to stop it  - topiramate (TOPAMAX) 100 MG tablet; Take 1 tablet (100 mg) by mouth 2 times daily  Dispense: 180 tablet; Refill: 0    5. Restless legs syndrome  Checking labs, maybe made worse by wellbutrin or her high blood sugars  - Iron and iron binding capacity    6. Screening for malignant neoplasm of cervix  done  - Pap imaged thin layer screen with HPV - recommended age 30 - 65  "years (select HPV order below)  - HPV High Risk Types DNA Cervical    Hx of PAD, pt not complaining of classic sx, recommend further testing is has sx of leg pains with walking or consistent hand coldness    Patient has been advised of split billing requirements and indicates understanding: Yes  COUNSELING:  Reviewed preventive health counseling, as reflected in patient instructions       Regular exercise       Healthy diet/nutrition    Estimated body mass index is 34.53 kg/m  as calculated from the following:    Height as of this encounter: 1.638 m (5' 4.5\").    Weight as of this encounter: 92.7 kg (204 lb 4.8 oz).    Weight management plan: Discussed healthy diet and exercise guidelines    She reports that she quit smoking about 36 years ago. Her smoking use included cigarettes. She has a 5.00 pack-year smoking history. She has never used smokeless tobacco.      Counseling Resources:  ATP IV Guidelines  Pooled Cohorts Equation Calculator  Breast Cancer Risk Calculator  BRCA-Related Cancer Risk Assessment: FHS-7 Tool  FRAX Risk Assessment  ICSI Preventive Guidelines  Dietary Guidelines for Americans, 2010  USDA's MyPlate  ASA Prophylaxis  Lung CA Screening    Concha Lucas MD  [employer] [place]  "

## 2021-07-01 NOTE — PROGRESS NOTES
Recd records from [facility] 07/01/2021 and forwarded to Concha Lucas for review and scanning

## 2021-07-02 LAB
ALBUMIN SERPL-MCNC: 3.7 G/DL (ref 3.4–5)
ALP SERPL-CCNC: 99 U/L (ref 40–150)
ALT SERPL W P-5'-P-CCNC: 25 U/L (ref 0–50)
ANION GAP SERPL CALCULATED.3IONS-SCNC: 4 MMOL/L (ref 3–14)
AST SERPL W P-5'-P-CCNC: 17 U/L (ref 0–45)
BILIRUB SERPL-MCNC: 0.6 MG/DL (ref 0.2–1.3)
BUN SERPL-MCNC: 27 MG/DL (ref 7–30)
CALCIUM SERPL-MCNC: 9.1 MG/DL (ref 8.5–10.1)
CHLORIDE SERPL-SCNC: 111 MMOL/L (ref 94–109)
CHOLEST SERPL-MCNC: 147 MG/DL
CO2 SERPL-SCNC: 23 MMOL/L (ref 20–32)
CREAT SERPL-MCNC: 0.95 MG/DL (ref 0.52–1.04)
GFR SERPL CREATININE-BSD FRML MDRD: 65 ML/MIN/{1.73_M2}
GLUCOSE SERPL-MCNC: 143 MG/DL (ref 70–99)
HDLC SERPL-MCNC: 76 MG/DL
IRON SATN MFR SERPL: 23 % (ref 15–46)
IRON SERPL-MCNC: 86 UG/DL (ref 35–180)
LDLC SERPL CALC-MCNC: 56 MG/DL
NONHDLC SERPL-MCNC: 71 MG/DL
POTASSIUM SERPL-SCNC: 4.1 MMOL/L (ref 3.4–5.3)
PROT SERPL-MCNC: 7.5 G/DL (ref 6.8–8.8)
SODIUM SERPL-SCNC: 138 MMOL/L (ref 133–144)
TIBC SERPL-MCNC: 366 UG/DL (ref 240–430)
TRIGL SERPL-MCNC: 73 MG/DL
TSH SERPL DL<=0.005 MIU/L-ACNC: 1.16 MU/L (ref 0.4–4)

## 2021-07-02 ASSESSMENT — ANXIETY QUESTIONNAIRES: GAD7 TOTAL SCORE: 18

## 2021-07-05 LAB
COPATH REPORT: NORMAL
PAP: NORMAL

## 2021-07-07 LAB
FINAL DIAGNOSIS: NORMAL
HPV HR 12 DNA CVX QL NAA+PROBE: NEGATIVE
HPV16 DNA SPEC QL NAA+PROBE: NEGATIVE
HPV18 DNA SPEC QL NAA+PROBE: NEGATIVE
SPECIMEN DESCRIPTION: NORMAL
SPECIMEN SOURCE CVX/VAG CYTO: NORMAL

## 2021-07-21 RX ORDER — EMPAGLIFLOZIN 25 MG/1
TABLET, FILM COATED ORAL
Qty: 90 TABLET | Refills: 1 | OUTPATIENT
Start: 2021-07-21

## 2021-08-23 ENCOUNTER — VIRTUAL VISIT (OUTPATIENT)
Dept: FAMILY MEDICINE | Facility: CLINIC | Age: 61
End: 2021-08-23
Payer: COMMERCIAL

## 2021-08-23 ENCOUNTER — MYC MEDICAL ADVICE (OUTPATIENT)
Dept: FAMILY MEDICINE | Facility: CLINIC | Age: 61
End: 2021-08-23

## 2021-08-23 DIAGNOSIS — M54.50 ACUTE LEFT-SIDED LOW BACK PAIN WITHOUT SCIATICA: Primary | ICD-10-CM

## 2021-08-23 PROCEDURE — 99213 OFFICE O/P EST LOW 20 MIN: CPT | Mod: GT | Performed by: NURSE PRACTITIONER

## 2021-08-23 NOTE — PROGRESS NOTES
Radha is a 61 year old who is being evaluated via a billable video visit.      How would you like to obtain your AVS? MyChart  If the video visit is dropped, the invitation should be resent by: Text to cell phone: [phone]  Will anyone else be joining your video visit? No      Video Start Time: 2:16 PM    Assessment & Plan     Acute left-sided low back pain without sciatica  Discussed options.  No red flags.  Would do well with PT.  Will schedule.  We will also check a UA to make sure nothing to do with her kidney as this was a concern for her.  - [facility] Referral; Future  - UA Macro with Reflex to Micro and Culture - lab collect; Future        Return in about 1 week (around 8/30/2021) for Lab Work.    ALETHEA Garcia Ra, CNP  [facility] [place]    Subjective   Radha is a 61 year old who presents for the following health issues     HPI     Pain History:  When did you first notice your pain? - 1 to 6 weeks   Have you seen any provider previously for this issue? No  How has your pain affected your ability to work? Still working. Difficulty bending, sitting, and going back to a standing position  Where in your body do you have pain? Back Pain  Onset/Duration: 1-6 weeks  Description:   Location of pain: left hip,abdomen, across the back  Character of pain: sharp, dull ache, stabbing, burning, constant  Pain radiation: radiates into the left buttocks  New numbness or weakness in legs, not attributed to pain: no   Intensity: mild pain. She took 3 arthritis aleve  Progression of Symptoms: same and intense  History:   Specific cause: started sitting in a different chair while staying at a different house  Pain interferes with job: YES- but is still working  History of back problems: no prior back problems  Any previous MRI or X-rays: None  Sees a specialist for back pain: No  Alleviating factors:   Improved by: aleve     Precipitating factors:  Worsened by: Bending, Standing and Sitting.    Therapies tried and outcome: cold and NSAIDs     Accompanying Signs & Symptoms:  Risk of Fracture: None  Risk of Cauda Equina: None  Risk of Infection: None  Risk of Cancer: None  Risk of Ankylosing Spondylitis: Onset at age <35, male, AND morning back stiffness  no       Back pain x3 weeks.  Pain is located L side low back.  Sometimes radiates across to the R side.  It does radiate into the L side buttock but not necessarily down her leg.  No injury.  They did recently move- she has been moving over the course of the past 6 months.  She hasn't lifted anything heavy for quite awhile.  The only change is that during the week they have been staying at a friend's house.  She notes that from the time she returns home from work in the afternoon until she goes to bed, she sits in the chair at her friend's.  She also sits a lot at work.  She is taking some arthritis aleve or ibuprofen and this has not helped much.      Review of Systems   Constitutional, HEENT, cardiovascular, pulmonary, gi and gu systems are negative, except as otherwise noted.      Objective    Vitals - Patient Reported  Pain Score: Severe Pain (6)      Vitals:  No vitals were obtained today due to virtual visit.    Physical Exam   GENERAL: Healthy, alert and no distress  EYES: Eyes grossly normal to inspection.  No discharge or erythema, or obvious scleral/conjunctival abnormalities.  RESP: No audible wheeze, cough, or visible cyanosis.  No visible retractions or increased work of breathing.    SKIN: Visible skin clear. No significant rash, abnormal pigmentation or lesions.  NEURO: Cranial nerves grossly intact.  Mentation and speech appropriate for age.  PSYCH: Mentation appears normal, affect normal/bright, judgement and insight intact, normal speech and appearance well-groomed.    She is still able to be up and around.  Yesterday she painted her garage door but did feel sore afterward.  No fever.  No n/v.  No blood in the urine.  No hx of  UTI.            Video-Visit Details    Type of service:  Video Visit    Video End Time:2:28 PM    Originating Location (pt. Location): Home    Distant Location (provider location):  [facility]     Platform used for Video Visit: Metaset

## 2021-08-25 ENCOUNTER — LAB (OUTPATIENT)
Dept: LAB | Facility: CLINIC | Age: 61
End: 2021-08-25
Payer: COMMERCIAL

## 2021-08-25 DIAGNOSIS — M54.50 ACUTE LEFT-SIDED LOW BACK PAIN WITHOUT SCIATICA: ICD-10-CM

## 2021-08-25 LAB
ALBUMIN UR-MCNC: NEGATIVE MG/DL
APPEARANCE UR: CLEAR
BILIRUB UR QL STRIP: NEGATIVE
COLOR UR AUTO: YELLOW
GLUCOSE UR STRIP-MCNC: >=1000 MG/DL
HGB UR QL STRIP: NEGATIVE
KETONES UR STRIP-MCNC: NEGATIVE MG/DL
LEUKOCYTE ESTERASE UR QL STRIP: NEGATIVE
NITRATE UR QL: NEGATIVE
PH UR STRIP: 5.5 [PH] (ref 5–7)
SP GR UR STRIP: 1.01 (ref 1–1.03)
UROBILINOGEN UR STRIP-ACNC: 0.2 E.U./DL

## 2021-08-25 PROCEDURE — 81003 URINALYSIS AUTO W/O SCOPE: CPT

## 2021-09-18 ENCOUNTER — HEALTH MAINTENANCE LETTER (OUTPATIENT)
Age: 61
End: 2021-09-18

## 2021-11-13 ENCOUNTER — HEALTH MAINTENANCE LETTER (OUTPATIENT)
Age: 61
End: 2021-11-13

## 2021-11-16 ENCOUNTER — IMMUNIZATION (OUTPATIENT)
Dept: INTERNAL MEDICINE | Facility: CLINIC | Age: 61
End: 2021-11-16
Payer: COMMERCIAL

## 2021-11-16 DIAGNOSIS — Z23 NEED FOR PROPHYLACTIC VACCINATION AND INOCULATION AGAINST INFLUENZA: Primary | ICD-10-CM

## 2021-11-16 PROCEDURE — 90471 IMMUNIZATION ADMIN: CPT

## 2021-11-16 PROCEDURE — 90682 RIV4 VACC RECOMBINANT DNA IM: CPT

## 2021-11-16 PROCEDURE — 99207 PR NO CHARGE NURSE ONLY: CPT

## 2021-12-13 DIAGNOSIS — I10 HTN, GOAL BELOW 140/90: Primary | ICD-10-CM

## 2021-12-13 DIAGNOSIS — Z00.00 ROUTINE GENERAL MEDICAL EXAMINATION AT A HEALTH CARE FACILITY: ICD-10-CM

## 2021-12-13 DIAGNOSIS — E78.5 HYPERLIPIDEMIA LDL GOAL <100: ICD-10-CM

## 2021-12-13 DIAGNOSIS — E66.01 MORBID OBESITY DUE TO EXCESS CALORIES (H): Chronic | ICD-10-CM

## 2021-12-13 DIAGNOSIS — F33.1 MAJOR DEPRESSIVE DISORDER, RECURRENT EPISODE, MODERATE (H): ICD-10-CM

## 2021-12-15 RX ORDER — GLUCOSAM/CHON-MSM1/C/MANG/BOSW 500-416.6
1 TABLET ORAL 3 TIMES DAILY
Qty: 300 EACH | Refills: 0 | Status: SHIPPED | OUTPATIENT
Start: 2021-12-15 | End: 2022-10-12

## 2021-12-15 RX ORDER — ATORVASTATIN CALCIUM 10 MG/1
10 TABLET, FILM COATED ORAL DAILY
Qty: 90 TABLET | Refills: 0 | Status: SHIPPED | OUTPATIENT
Start: 2021-12-15 | End: 2022-03-08

## 2021-12-15 RX ORDER — CALCIUM CITRATE/VITAMIN D3 200MG-6.25
1 TABLET ORAL DAILY
Qty: 300 STRIP | Refills: 0 | Status: SHIPPED | OUTPATIENT
Start: 2021-12-15 | End: 2022-10-12

## 2021-12-15 RX ORDER — LISINOPRIL 5 MG/1
5 TABLET ORAL DAILY
Qty: 90 TABLET | Refills: 0 | Status: SHIPPED | OUTPATIENT
Start: 2021-12-15 | End: 2022-03-08

## 2021-12-15 RX ORDER — ASPIRIN 325 MG
325 TABLET, DELAYED RELEASE (ENTERIC COATED) ORAL DAILY
Qty: 100 TABLET | Refills: 0 | Status: SHIPPED | OUTPATIENT
Start: 2021-12-15

## 2021-12-15 NOTE — TELEPHONE ENCOUNTER
Routing refill request to provider for review/approval because:  Labs not current:  A1c > 8 (Needs every 3 months)  PHQ-9 and VINITA-7 elevated.   Appointments in Next Year      Jan 06, 2022  9:20 AM  Office Visit with Concha Lucas MD  [facility] ([facility] [place] ) [phone]           PHQ 8/25/2020 12/11/2020 7/1/2021   PHQ-9 Total Score 9 - 13   Q9: Thoughts of better off dead/self-harm past 2 weeks Not at all (No Data) Not at all      VINITA-7 SCORE 8/25/2020 12/11/2020 7/1/2021   Total Score - - -   Total Score - - -   Total Score 14 14 18     Lab Results   Component Value Date    A1C 8.5 07/01/2021    A1C 8.9 04/28/2021    A1C 7.6 12/07/2020    A1C 8.0 06/29/2020    A1C 9.4 01/27/2020      Hilary Lowry RN

## 2021-12-16 RX ORDER — BUPROPION HYDROCHLORIDE 150 MG/1
150 TABLET ORAL EVERY MORNING
Qty: 90 TABLET | Refills: 1 | Status: SHIPPED | OUTPATIENT
Start: 2021-12-16 | End: 2022-01-06

## 2021-12-16 RX ORDER — GLIMEPIRIDE 2 MG/1
2 TABLET ORAL
Qty: 90 TABLET | Refills: 0 | Status: SHIPPED | OUTPATIENT
Start: 2021-12-16 | End: 2022-01-06

## 2021-12-16 RX ORDER — TOPIRAMATE 100 MG/1
100 TABLET, FILM COATED ORAL 2 TIMES DAILY
Qty: 180 TABLET | Refills: 0 | Status: SHIPPED | OUTPATIENT
Start: 2021-12-16 | End: 2022-04-08

## 2021-12-16 RX ORDER — PIOGLITAZONEHYDROCHLORIDE 30 MG/1
30 TABLET ORAL DAILY
Qty: 90 TABLET | Refills: 0 | Status: SHIPPED | OUTPATIENT
Start: 2021-12-16 | End: 2022-03-08

## 2022-01-06 ENCOUNTER — OFFICE VISIT (OUTPATIENT)
Dept: FAMILY MEDICINE | Facility: CLINIC | Age: 62
End: 2022-01-06
Payer: COMMERCIAL

## 2022-01-06 VITALS
OXYGEN SATURATION: 99 % | TEMPERATURE: 98.5 F | HEART RATE: 71 BPM | SYSTOLIC BLOOD PRESSURE: 124 MMHG | RESPIRATION RATE: 16 BRPM | DIASTOLIC BLOOD PRESSURE: 76 MMHG

## 2022-01-06 DIAGNOSIS — Z23 HIGH PRIORITY FOR 2019-NCOV VACCINE: ICD-10-CM

## 2022-01-06 DIAGNOSIS — F33.1 MAJOR DEPRESSIVE DISORDER, RECURRENT EPISODE, MODERATE (H): ICD-10-CM

## 2022-01-06 DIAGNOSIS — I73.9 PVD (PERIPHERAL VASCULAR DISEASE) (H): ICD-10-CM

## 2022-01-06 DIAGNOSIS — E78.5 HYPERLIPIDEMIA LDL GOAL <100: ICD-10-CM

## 2022-01-06 DIAGNOSIS — E66.01 MORBID OBESITY DUE TO EXCESS CALORIES (H): ICD-10-CM

## 2022-01-06 LAB
ALBUMIN SERPL-MCNC: 3.6 G/DL (ref 3.4–5)
ALP SERPL-CCNC: 98 U/L (ref 40–150)
ALT SERPL W P-5'-P-CCNC: 22 U/L (ref 0–50)
ANION GAP SERPL CALCULATED.3IONS-SCNC: 5 MMOL/L (ref 3–14)
AST SERPL W P-5'-P-CCNC: 11 U/L (ref 0–45)
BILIRUB SERPL-MCNC: 0.6 MG/DL (ref 0.2–1.3)
BUN SERPL-MCNC: 23 MG/DL (ref 7–30)
CALCIUM SERPL-MCNC: 8.7 MG/DL (ref 8.5–10.1)
CHLORIDE BLD-SCNC: 113 MMOL/L (ref 94–109)
CHOLEST SERPL-MCNC: 140 MG/DL
CO2 SERPL-SCNC: 23 MMOL/L (ref 20–32)
CREAT SERPL-MCNC: 0.76 MG/DL (ref 0.52–1.04)
ERYTHROCYTE [DISTWIDTH] IN BLOOD BY AUTOMATED COUNT: 14.9 % (ref 10–15)
FASTING STATUS PATIENT QL REPORTED: YES
GFR SERPL CREATININE-BSD FRML MDRD: 89 ML/MIN/1.73M2
GLUCOSE BLD-MCNC: 148 MG/DL (ref 70–99)
HBA1C MFR BLD: 9.6 % (ref 0–5.6)
HCT VFR BLD AUTO: 46.2 % (ref 35–47)
HDLC SERPL-MCNC: 70 MG/DL
HGB BLD-MCNC: 14.8 G/DL (ref 11.7–15.7)
LDLC SERPL CALC-MCNC: 53 MG/DL
MCH RBC QN AUTO: 29.7 PG (ref 26.5–33)
MCHC RBC AUTO-ENTMCNC: 32 G/DL (ref 31.5–36.5)
MCV RBC AUTO: 93 FL (ref 78–100)
NONHDLC SERPL-MCNC: 70 MG/DL
PLATELET # BLD AUTO: 188 10E3/UL (ref 150–450)
POTASSIUM BLD-SCNC: 3.8 MMOL/L (ref 3.4–5.3)
PROT SERPL-MCNC: 7.4 G/DL (ref 6.8–8.8)
RBC # BLD AUTO: 4.99 10E6/UL (ref 3.8–5.2)
SODIUM SERPL-SCNC: 141 MMOL/L (ref 133–144)
TRIGL SERPL-MCNC: 85 MG/DL
TSH SERPL DL<=0.005 MIU/L-ACNC: 1.1 MU/L (ref 0.4–4)
WBC # BLD AUTO: 7.7 10E3/UL (ref 4–11)

## 2022-01-06 PROCEDURE — 0054A COVID-19,PF,PFIZER (12+ YRS): CPT | Performed by: FAMILY MEDICINE

## 2022-01-06 PROCEDURE — 36415 COLL VENOUS BLD VENIPUNCTURE: CPT | Performed by: FAMILY MEDICINE

## 2022-01-06 PROCEDURE — 80053 COMPREHEN METABOLIC PANEL: CPT | Performed by: FAMILY MEDICINE

## 2022-01-06 PROCEDURE — 82043 UR ALBUMIN QUANTITATIVE: CPT | Performed by: FAMILY MEDICINE

## 2022-01-06 PROCEDURE — 83036 HEMOGLOBIN GLYCOSYLATED A1C: CPT | Performed by: FAMILY MEDICINE

## 2022-01-06 PROCEDURE — 80061 LIPID PANEL: CPT | Performed by: FAMILY MEDICINE

## 2022-01-06 PROCEDURE — 84443 ASSAY THYROID STIM HORMONE: CPT | Performed by: FAMILY MEDICINE

## 2022-01-06 PROCEDURE — 91305 COVID-19,PF,PFIZER (12+ YRS): CPT | Performed by: FAMILY MEDICINE

## 2022-01-06 PROCEDURE — 96127 BRIEF EMOTIONAL/BEHAV ASSMT: CPT | Performed by: FAMILY MEDICINE

## 2022-01-06 PROCEDURE — 99215 OFFICE O/P EST HI 40 MIN: CPT | Performed by: FAMILY MEDICINE

## 2022-01-06 PROCEDURE — 85027 COMPLETE CBC AUTOMATED: CPT | Performed by: FAMILY MEDICINE

## 2022-01-06 RX ORDER — BUPROPION HYDROCHLORIDE 300 MG/1
300 TABLET ORAL EVERY MORNING
Qty: 90 TABLET | Refills: 1 | Status: SHIPPED | OUTPATIENT
Start: 2022-01-06 | End: 2022-04-08

## 2022-01-06 RX ORDER — GLIMEPIRIDE 4 MG/1
4 TABLET ORAL
Qty: 90 TABLET | Refills: 1 | Status: SHIPPED | OUTPATIENT
Start: 2022-01-06 | End: 2022-10-12

## 2022-01-06 ASSESSMENT — ANXIETY QUESTIONNAIRES
GAD7 TOTAL SCORE: 6
3. WORRYING TOO MUCH ABOUT DIFFERENT THINGS: NOT AT ALL
5. BEING SO RESTLESS THAT IT IS HARD TO SIT STILL: SEVERAL DAYS
2. NOT BEING ABLE TO STOP OR CONTROL WORRYING: SEVERAL DAYS
6. BECOMING EASILY ANNOYED OR IRRITABLE: MORE THAN HALF THE DAYS
IF YOU CHECKED OFF ANY PROBLEMS ON THIS QUESTIONNAIRE, HOW DIFFICULT HAVE THESE PROBLEMS MADE IT FOR YOU TO DO YOUR WORK, TAKE CARE OF THINGS AT HOME, OR GET ALONG WITH OTHER PEOPLE: SOMEWHAT DIFFICULT
1. FEELING NERVOUS, ANXIOUS, OR ON EDGE: SEVERAL DAYS
7. FEELING AFRAID AS IF SOMETHING AWFUL MIGHT HAPPEN: NOT AT ALL

## 2022-01-06 ASSESSMENT — PATIENT HEALTH QUESTIONNAIRE - PHQ9: 5. POOR APPETITE OR OVEREATING: SEVERAL DAYS

## 2022-01-06 ASSESSMENT — PAIN SCALES - GENERAL: PAINLEVEL: NO PAIN (0)

## 2022-01-06 NOTE — PROGRESS NOTES
"  Assessment & Plan     Type 2 diabetes, uncontrolled, with neuropathy (H)  Checking labs, pt hesitant to increase meds today, but willing to take Amaryl if a1c is higher, which it is, so will refill  - Comprehensive metabolic panel; Future  - Hemoglobin A1c; Future  - Albumin Random Urine Quantitative with Creat Ratio; Future  - TSH with free T4 reflex; Future  - CBC with platelets; Future  - Comprehensive metabolic panel  - Hemoglobin A1c  - Albumin Random Urine Quantitative with Creat Ratio  - TSH with free T4 reflex  - CBC with platelets    Major depressive disorder, recurrent episode, moderate (H)  Will raise the dose, due to mood, recommend adding zoloft, but pt decilned for now and will let me know  - buPROPion (WELLBUTRIN XL) 300 MG 24 hr tablet; Take 1 tablet (300 mg) by mouth every morning  - Adult Mental Health Referral; Future    PVD (peripheral vascular disease) (H)  Stable, recommend more exercise and this is her goal    Morbid obesity due to excess calories (H)  Will check labs  - TSH with free T4 reflex; Future  - TSH with free T4 reflex    Hyperlipidemia LDL goal <100  Checking labs today, fasting  - Lipid panel reflex to direct LDL Fasting; Future  - Comprehensive metabolic panel; Future  - Lipid panel reflex to direct LDL Fasting  - Comprehensive metabolic panel    High priority for 2019-nCoV vaccine  agrees  - COVID-19,PF,PFIZER (12+ Yrs GRAY LABEL)    Ordering of each unique test  Prescription drug management  43 minutes spent on the date of the encounter doing chart review, history and exam, documentation and further activities per the note       BMI:   Estimated body mass index is 34.53 kg/m  as calculated from the following:    Height as of 7/1/21: 1.638 m (5' 4.5\").    Weight as of 7/1/21: 92.7 kg (204 lb 4.8 oz).   Weight management plan: Discussed healthy diet and exercise guidelines    Work on weight loss  Regular exercise    Return in about 3 months (around 4/6/2022) for Diabetes check, " Depression/anxiety, Video Visit.    Concha Lucas MD  [facility] Garcia is a 61 year old who presents for the following health issues     Stressful family lately. Life has been chaotic, moved and just needs to be more stable.      Imm/Inj    History of Present Illness       Diabetes:   She presents for follow up of diabetes.  She is checking home blood glucose a few times a week. She checks blood glucose before meals.  Blood glucose is sometimes over 200 and never under 70. She is aware of hypoglycemia symptoms including dizziness and blurred vision. She is concerned about other. She is having excessive thirst and weight gain.           Lab Results   Component Value Date    A1C 8.5 07/01/2021    A1C 8.9 04/28/2021    A1C 7.6 12/07/2020    A1C 8.0 06/29/2020    A1C 9.4 01/27/2020     Stopped metfromin due to GI side effects. The amaryl never took, jardiance is taking it daily, trulicity she did not like-did not feel good, stomach pains and diarrhea, actos daily.         Hyperlipidemia Follow-Up      Are you regularly taking any medication or supplement to lower your cholesterol?   Yes- atorvastatin    Are you having muscle aches or other side effects that you think could be caused by your cholesterol lowering medication?  No   LDL Cholesterol Calculated   Date Value Ref Range Status   07/01/2021 56 <100 mg/dL Final     Comment:     Desirable:       <100 mg/dl             Depression Followup    How are you doing with your depression since your last visit? Stable     Are you having other symptoms that might be associated with depression? No    Have you had a significant life event?  No     Are you feeling anxious or having panic attacks?   No    Do you have any concerns with your use of alcohol or other drugs? No     Taking wellbutrin 150mg    PVD-sometimes gets cramps but not sure that is what is causing it. She has been eating more salt but just due to fast food,not  over doing it per patient, in fact, wondering if she should be eating more salt to avoid these cramps? Maybe her sodium is low? She takes no diuretics, but her boss is in liver failure and this happened to him(told to eat some salt for leg cramps).    Social History     Tobacco Use     Smoking status: Former Smoker     Packs/day: 1.00     Years: 5.00     Pack years: 5.00     Types: Cigarettes     Quit date: 1985     Years since quittin.0     Smokeless tobacco: Never Used     Tobacco comment: quit in  smoked one pack a day   Substance Use Topics     Alcohol use: Yes     Comment: Minimal     Drug use: No     PHQ 2020   PHQ-9 Total Score 9 - 13   Q9: Thoughts of better off dead/self-harm past 2 weeks Not at all (No Data) Not at all     VINITA-7 SCORE 2020   Total Score - - -   Total Score - - -   Total Score 14 18 6     Last PHQ-9 2022   1.  Little interest or pleasure in doing things 2   2.  Feeling down, depressed, or hopeless 2   3.  Trouble falling or staying asleep, or sleeping too much 2   4.  Feeling tired or having little energy 2   5.  Poor appetite or overeating 2   6.  Feeling bad about yourself 2   7.  Trouble concentrating 1   8.  Moving slowly or restless 0   Q9: Thoughts of better off dead/self-harm past 2 weeks -   PHQ-9 Total Score -   Difficulty at work, home, or with people -     Pt declined to answer the question about hurting herself       VINITA-7  2022   1. Feeling nervous, anxious, or on edge 1   2. Not being able to stop or control worrying 1   3. Worrying too much about different things 0   4. Trouble relaxing 1   5. Being so restless that it is hard to sit still 1   6. Becoming easily annoyed or irritable 2   7. Feeling afraid, as if something awful might happen 0   VINITA-7 Total Score 6   If you checked any problems, how difficult have they made it for you to do your work, take care of things at home, or get along with other  people? Somewhat difficult       Suicide Assessment Five-step Evaluation and Treatment (SAFE-T)      How many servings of fruits and vegetables do you eat daily?  2-3    On average, how many sweetened beverages do you drink each day (Examples: soda, juice, sweet tea, etc.  Do NOT count diet or artificially sweetened beverages)?   0    How many days per week do you exercise enough to make your heart beat faster? 0    How many minutes a day do you exercise enough to make your heart beat faster? 0    How many days per week do you miss taking your medication? 0        Review of Systems   CONSTITUTIONAL: NEGATIVE for fever, chills, change in weight  ENT/MOUTH: NEGATIVE for ear, mouth and throat problems  RESP: NEGATIVE for significant cough or SOB  CV: NEGATIVE for chest pain, palpitations or peripheral edema      Objective    /76   Pulse 71   Temp 98.5  F (36.9  C) (Oral)   Resp 16   LMP 10/03/2010   SpO2 99%   There is no height or weight on file to calculate BMI.  Physical Exam   GENERAL: healthy, alert and no distress  EYES: Eyes grossly normal to inspection, PERRL and conjunctivae and sclerae normal  HENT: ear canals and TM's normal, nose and mouth without ulcers or lesions  NECK: no adenopathy, no asymmetry, masses, or scars and thyroid normal to palpation  RESP: lungs clear to auscultation - no rales, rhonchi or wheezes  CV: regular rate and rhythm, normal S1 S2, no S3 or S4, no murmur, click or rub, no peripheral edema and peripheral pulses strong  ABDOMEN: soft, nontender, no hepatosplenomegaly, no masses and bowel sounds normal  MS: no gross musculoskeletal defects noted, no edema  SKIN: no suspicious lesions or rashes  NEURO: Normal strength and tone, mentation intact and speech normal  PSYCH: mentation appears normal, affect normal/bright    Foot exam declined by pt, she says her feet are fine, no sores and feels everything

## 2022-01-07 ASSESSMENT — ANXIETY QUESTIONNAIRES: GAD7 TOTAL SCORE: 6

## 2022-01-11 LAB
CREAT UR-MCNC: 105 MG/DL
MICROALBUMIN UR-MCNC: 7 MG/L
MICROALBUMIN/CREAT UR: 6.67 MG/G CR (ref 0–25)

## 2022-02-07 ENCOUNTER — E-VISIT (OUTPATIENT)
Dept: FAMILY MEDICINE | Facility: CLINIC | Age: 62
End: 2022-02-07
Payer: COMMERCIAL

## 2022-02-07 DIAGNOSIS — H10.33 ACUTE BACTERIAL CONJUNCTIVITIS OF BOTH EYES: ICD-10-CM

## 2022-02-07 DIAGNOSIS — J03.90 TONSILLITIS: Primary | ICD-10-CM

## 2022-02-07 PROCEDURE — 99421 OL DIG E/M SVC 5-10 MIN: CPT | Performed by: FAMILY MEDICINE

## 2022-02-07 RX ORDER — AMOXICILLIN 500 MG/1
500 CAPSULE ORAL 2 TIMES DAILY
Qty: 20 CAPSULE | Refills: 0 | Status: SHIPPED | OUTPATIENT
Start: 2022-02-07 | End: 2022-02-17

## 2022-02-07 NOTE — PATIENT INSTRUCTIONS
Please consider making a lab only appointment for strep swab.  If you are unable to come in, please  the antibiotics and start right away.

## 2022-02-08 RX ORDER — POLYMYXIN B SULFATE AND TRIMETHOPRIM 1; 10000 MG/ML; [USP'U]/ML
1 SOLUTION OPHTHALMIC 4 TIMES DAILY
Qty: 10 ML | Refills: 0 | Status: SHIPPED | OUTPATIENT
Start: 2022-02-08 | End: 2022-02-13

## 2022-03-04 ENCOUNTER — TRANSFERRED RECORDS (OUTPATIENT)
Dept: HEALTH INFORMATION MANAGEMENT | Facility: CLINIC | Age: 62
End: 2022-03-04
Payer: COMMERCIAL

## 2022-03-04 LAB
RETINOPATHY: NEGATIVE
RETINOPATHY: NORMAL

## 2022-04-08 ENCOUNTER — TELEPHONE (OUTPATIENT)
Dept: FAMILY MEDICINE | Facility: CLINIC | Age: 62
End: 2022-04-08

## 2022-04-08 ENCOUNTER — VIRTUAL VISIT (OUTPATIENT)
Dept: FAMILY MEDICINE | Facility: CLINIC | Age: 62
End: 2022-04-08
Payer: COMMERCIAL

## 2022-04-08 DIAGNOSIS — I10 HTN, GOAL BELOW 140/90: ICD-10-CM

## 2022-04-08 DIAGNOSIS — E78.5 HYPERLIPIDEMIA LDL GOAL <100: ICD-10-CM

## 2022-04-08 DIAGNOSIS — Z00.00 ROUTINE GENERAL MEDICAL EXAMINATION AT A HEALTH CARE FACILITY: ICD-10-CM

## 2022-04-08 DIAGNOSIS — F33.1 MAJOR DEPRESSIVE DISORDER, RECURRENT EPISODE, MODERATE (H): ICD-10-CM

## 2022-04-08 DIAGNOSIS — E66.01 MORBID OBESITY DUE TO EXCESS CALORIES (H): Chronic | ICD-10-CM

## 2022-04-08 PROCEDURE — 99214 OFFICE O/P EST MOD 30 MIN: CPT | Mod: GT | Performed by: FAMILY MEDICINE

## 2022-04-08 RX ORDER — ATORVASTATIN CALCIUM 10 MG/1
10 TABLET, FILM COATED ORAL DAILY
Qty: 90 TABLET | Refills: 1 | Status: SHIPPED | OUTPATIENT
Start: 2022-04-08 | End: 2022-10-12

## 2022-04-08 RX ORDER — LANCETS
EACH MISCELLANEOUS
Qty: 102 EACH | Refills: 6 | Status: SHIPPED | OUTPATIENT
Start: 2022-04-08 | End: 2023-02-27

## 2022-04-08 RX ORDER — LISINOPRIL 5 MG/1
5 TABLET ORAL DAILY
Qty: 90 TABLET | Refills: 1 | Status: SHIPPED | OUTPATIENT
Start: 2022-04-08 | End: 2022-10-12

## 2022-04-08 RX ORDER — BUPROPION HYDROCHLORIDE 300 MG/1
300 TABLET ORAL EVERY MORNING
Qty: 90 TABLET | Refills: 1 | Status: SHIPPED | OUTPATIENT
Start: 2022-04-08 | End: 2022-10-12

## 2022-04-08 RX ORDER — GLUCOSAMINE HCL/CHONDROITIN SU 500-400 MG
CAPSULE ORAL
Qty: 100 EACH | Refills: 3 | Status: SHIPPED | OUTPATIENT
Start: 2022-04-08

## 2022-04-08 RX ORDER — PIOGLITAZONEHYDROCHLORIDE 30 MG/1
30 TABLET ORAL DAILY
Qty: 90 TABLET | Refills: 1 | Status: SHIPPED | OUTPATIENT
Start: 2022-04-08 | End: 2022-10-12

## 2022-04-08 RX ORDER — GLIMEPIRIDE 2 MG/1
2 TABLET ORAL
COMMUNITY
End: 2022-10-12

## 2022-04-08 RX ORDER — TOPIRAMATE 100 MG/1
100 TABLET, FILM COATED ORAL 2 TIMES DAILY
Qty: 180 TABLET | Refills: 0 | Status: SHIPPED | OUTPATIENT
Start: 2022-04-08 | End: 2022-07-12

## 2022-04-08 RX ORDER — BLOOD SUGAR DIAGNOSTIC
STRIP MISCELLANEOUS
Qty: 100 STRIP | Refills: 6 | Status: SHIPPED | OUTPATIENT
Start: 2022-04-08 | End: 2023-02-27

## 2022-04-08 RX ORDER — BLOOD-GLUCOSE CONTROL, NORMAL
EACH MISCELLANEOUS
Qty: 1 EACH | Refills: 1 | Status: SHIPPED | OUTPATIENT
Start: 2022-04-08 | End: 2023-02-27

## 2022-04-08 ASSESSMENT — ANXIETY QUESTIONNAIRES
GAD7 TOTAL SCORE: 7
7. FEELING AFRAID AS IF SOMETHING AWFUL MIGHT HAPPEN: NOT AT ALL
IF YOU CHECKED OFF ANY PROBLEMS ON THIS QUESTIONNAIRE, HOW DIFFICULT HAVE THESE PROBLEMS MADE IT FOR YOU TO DO YOUR WORK, TAKE CARE OF THINGS AT HOME, OR GET ALONG WITH OTHER PEOPLE: NOT DIFFICULT AT ALL
6. BECOMING EASILY ANNOYED OR IRRITABLE: MORE THAN HALF THE DAYS
5. BEING SO RESTLESS THAT IT IS HARD TO SIT STILL: MORE THAN HALF THE DAYS
3. WORRYING TOO MUCH ABOUT DIFFERENT THINGS: NOT AT ALL
1. FEELING NERVOUS, ANXIOUS, OR ON EDGE: NOT AT ALL
2. NOT BEING ABLE TO STOP OR CONTROL WORRYING: NOT AT ALL

## 2022-04-08 ASSESSMENT — PATIENT HEALTH QUESTIONNAIRE - PHQ9: 5. POOR APPETITE OR OVEREATING: NEARLY EVERY DAY

## 2022-04-08 NOTE — TELEPHONE ENCOUNTER
Resending rx for blood glucose supplies for generic alternative covered by insurance per provider message below.     Gerber CRANE RN

## 2022-04-08 NOTE — PROGRESS NOTES
Radha is a 61 year old who is being evaluated via a billable video visit.      How would you like to obtain your AVS? MyChart  If the video visit is dropped, the invitation should be resent by: Text to cell phone: [phone]  Will anyone else be joining your video visit? No    Video Start Time: 10:47 AM    Assessment & Plan     Type 2 diabetes, uncontrolled, with neuropathy (H)  Poor control previously, will check a1c, if still high will raise amaryl to 4mg, pt is also working on getting more fruit in per day and exercising more  - HEMOGLOBIN A1C; Future  - empagliflozin (JARDIANCE) 25 MG TABS tablet; Take 1 tablet (25 mg) by mouth daily  - pioglitazone (ACTOS) 30 MG tablet; Take 1 tablet (30 mg) by mouth daily  - Comprehensive metabolic panel (BMP + Alb, Alk Phos, ALT, AST, Total. Bili, TP); Future  - blood glucose monitoring (ACCU-CHEK KALYANI SMARTVIEW) meter device kit; Use to test blood sugar 1 times daily.  - blood glucose (ACCU-CHEK SMARTVIEW) test strip; Use to test blood sugar 1 times daily.  - blood glucose calibration (ACCU-CHEK SMARTVIEW CONTROL) solution; Use to calibrate blood glucose monitor as directed.  - blood glucose monitoring (ACCU-CHEK FASTCLIX) lancets; Use to test blood sugar 1 times daily or as directed.  - alcohol swab prep pads; Use to swab area of injection/raj as directed.    Major depressive disorder, recurrent episode, moderate (H)  Cont same, doing better on higher dose  - buPROPion (WELLBUTRIN XL) 300 MG 24 hr tablet; Take 1 tablet (300 mg) by mouth every morning    Hyperlipidemia LDL goal <100  Controlled, refilled , reviewed labs with pt  - atorvastatin (LIPITOR) 10 MG tablet; Take 1 tablet (10 mg) by mouth daily  - Comprehensive metabolic panel (BMP + Alb, Alk Phos, ALT, AST, Total. Bili, TP); Future    HTN, goal below 140/90  Refilled, controlled  - lisinopril (ZESTRIL) 5 MG tablet; Take 1 tablet (5 mg) by mouth daily    obesity due to excess calories (H)  Cont same, not  helping much with weight loss, but is movitated to move more and eat healthier  - topiramate (TOPAMAX) 100 MG tablet; Take 1 tablet (100 mg) by mouth 2 times daily    Ordering of each unique test  Prescription drug management         Work on weight loss  Regular exercise    Return in about 6 months (around 10/8/2022) for Preventive Visit, Diabetes check, In-Clinic Visit.    Concha Lucas MD  [facility] Garcia is a 61 year old who presents for the following health issues     HPI     Diabetes Follow-up      How often are you checking your blood sugar? Not at all    What concerns do you have today about your diabetes? Other: but patient requesting a new meter and supplies.       Do you have any of these symptoms? (Select all that apply)  Excessive thirst and Weight gain    Have you had a diabetic eye exam in the last 12 months? Yes- Date of last eye exam: 03/4/22,  Location: [facility] in [facility].    Lab Results   Component Value Date    A1C 9.6 01/06/2022    A1C 8.5 07/01/2021    A1C 8.9 04/28/2021    A1C 7.6 12/07/2020    A1C 8.0 06/29/2020    A1C 9.4 01/27/2020     [facility] will no longer support julien and needs accucheck machine, ran out of supplies a few weeks ago. Prior to that she was checking each morning, usually <100 now, since taking 2mg glimiperide(had those at home and never did  the 4mg) she did not realize she was suppose to start taking 4mg.   actos 30mg  jardiance 25mg 1 per day    Morbid obesity, takng topamax 100mg twice daily      Hyperlipidemia Follow-Up      Are you regularly taking any medication or supplement to lower your cholesterol?   Yes- lipitor    Are you having muscle aches or other side effects that you think could be caused by your cholesterol lowering medication?  No    Hypertension Follow-up      Do you check your blood pressure regularly outside of the clinic? No     Are you following a low salt diet?  Yes    Are your blood pressures ever more than 140 on the top number (systolic) OR more   than 90 on the bottom number (diastolic), for example 140/90? N/a  LDL Cholesterol Calculated   Date Value Ref Range Status   2022 53 <=100 mg/dL Final   2021 56 <100 mg/dL Final     Comment:     Desirable:       <100 mg/dl     lipitor 10mg     BP Readings from Last 2 Encounters:   22 124/76   21 104/68     Hemoglobin A1C POCT (%)   Date Value   2021 8.5 (H)   2021 8.9 (H)     Hemoglobin A1C (%)   Date Value   2022 9.6 (H)     LDL Cholesterol Calculated (mg/dL)   Date Value   2022 53   2021 56   2020 37     Patient presents for evaluation of hypertension.  She indicates that she is feeling well and denies any symptoms referable to her elevated blood pressure. Specifically denies chest pain, palpitations, dyspnea, orthopnea, PND or peripheral edema. Current medication regimen is as listed below. Patient denies any side effects of medication.  Taking lisinopril 5mg, doing well.        Depression Followup    How are you doing with your depression since your last visit? No change    Are you having other symptoms that might be associated with depression? No    Have you had a significant life event?  No     Are you feeling anxious or having panic attacks?   No    Do you have any concerns with your use of alcohol or other drugs? No     Taking higher wellbutrin and doing better    Social History     Tobacco Use     Smoking status: Former Smoker     Packs/day: 1.00     Years: 5.00     Pack years: 5.00     Types: Cigarettes     Quit date: 1985     Years since quittin.2     Smokeless tobacco: Never Used     Tobacco comment: quit in  smoked one pack a day   Vaping Use     Vaping Use: Never used   Substance Use Topics     Alcohol use: Yes     Comment: Minimal     Drug use: No     PHQ 2020   PHQ-9 Total Score - 13 -   Q9: Thoughts of better off  dead/self-harm past 2 weeks (No Data) Not at all (No Data)     VINITA-7 SCORE 7/1/2021 1/6/2022 4/8/2022   Total Score - - -   Total Score - - -   Total Score 18 6 7     Last PHQ-9 4/8/2022   1.  Little interest or pleasure in doing things 1   2.  Feeling down, depressed, or hopeless 1   3.  Trouble falling or staying asleep, or sleeping too much 1   4.  Feeling tired or having little energy 1   5.  Poor appetite or overeating 1   6.  Feeling bad about yourself 0   7.  Trouble concentrating 0   8.  Moving slowly or restless 0   Q9: Thoughts of better off dead/self-harm past 2 weeks (No Data)   PHQ-9 Total Score 5   Difficulty at work, home, or with people Not difficult at all     VINITA-7  4/8/2022   1. Feeling nervous, anxious, or on edge 0   2. Not being able to stop or control worrying 0   3. Worrying too much about different things 0   4. Trouble relaxing 3   5. Being so restless that it is hard to sit still 2   6. Becoming easily annoyed or irritable 2   7. Feeling afraid, as if something awful might happen 0   VINITA-7 Total Score 7   If you checked any problems, how difficult have they made it for you to do your work, take care of things at home, or get along with other people? Not difficult at all       Suicide Assessment Five-step Evaluation and Treatment (SAFE-T)    Morbid obesity Medication Followup of TOPAMAX     Taking Medication as prescribed: yes    Side Effects:  None    Medication Helping Symptoms:  NO    Trying to loose weight to help with diabetes    Goal is to move more, has not gained or lost    Exercising more    Poor diet, which is not new. Not getting 3 fruits a day       Starting beach body's on demand, more dance and raising her arms up. This is on line. Starting off slow.  Lots of options, so starting off with low impact and balance.    Goal is to get out side and walk.   norditracks are in the pole barn.      Review of Systems   CONSTITUTIONAL: NEGATIVE for fever, chills, change in  weight  ENT/MOUTH: NEGATIVE for ear, mouth and throat problems  RESP: NEGATIVE for significant cough or SOB  CV: NEGATIVE for chest pain, palpitations or peripheral edema      Objective    Vitals - Patient Reported  Systolic (Patient Reported): 106  Diastolic (Patient Reported): 74  Weight (Patient Reported): 90.7 kg (200 lb)  Temperature (Patient Reported): 97  F (36.1  C) (temporal)  Pain Score: No Pain (0)      Vitals:  No vitals were obtained today due to virtual visit.    Physical Exam   GENERAL: Healthy, alert and no distress  EYES: Eyes grossly normal to inspection.  No discharge or erythema, or obvious scleral/conjunctival abnormalities.  RESP: No audible wheeze, cough, or visible cyanosis.  No visible retractions or increased work of breathing.    SKIN: Visible skin clear. No significant rash, abnormal pigmentation or lesions.  NEURO: Cranial nerves grossly intact.  Mentation and speech appropriate for age.  PSYCH: Mentation appears normal, affect normal/bright, judgement and insight intact, normal speech and appearance well-groomed.                Video-Visit Details    Type of service:  Video Visit    Video End Time:11:09 AM    Originating Location (pt. Location): Home    Distant Location (provider location):  [facility] [email or web address]     Platform used for Video Visit: Movie Mouth

## 2022-04-08 NOTE — TELEPHONE ENCOUNTER
Pharmacy calling inquiring about alternative rx for blood glucose device/supplies. Per pharmacy, Accu-Chek Steffany Smartview meter is discontinued. Do you have another specific alternative or send rx for generic alternative?     Gerber CRANE RN

## 2022-04-09 ASSESSMENT — ANXIETY QUESTIONNAIRES: GAD7 TOTAL SCORE: 7

## 2022-04-14 ENCOUNTER — LAB (OUTPATIENT)
Dept: LAB | Facility: CLINIC | Age: 62
End: 2022-04-14
Payer: COMMERCIAL

## 2022-04-14 DIAGNOSIS — E78.5 HYPERLIPIDEMIA LDL GOAL <100: ICD-10-CM

## 2022-04-14 LAB
ALBUMIN SERPL-MCNC: 3.6 G/DL (ref 3.4–5)
ALP SERPL-CCNC: 88 U/L (ref 40–150)
ALT SERPL W P-5'-P-CCNC: 24 U/L (ref 0–50)
ANION GAP SERPL CALCULATED.3IONS-SCNC: 3 MMOL/L (ref 3–14)
AST SERPL W P-5'-P-CCNC: 20 U/L (ref 0–45)
BILIRUB SERPL-MCNC: 0.6 MG/DL (ref 0.2–1.3)
BUN SERPL-MCNC: 24 MG/DL (ref 7–30)
CALCIUM SERPL-MCNC: 9.4 MG/DL (ref 8.5–10.1)
CHLORIDE BLD-SCNC: 112 MMOL/L (ref 94–109)
CO2 SERPL-SCNC: 24 MMOL/L (ref 20–32)
CREAT SERPL-MCNC: 0.92 MG/DL (ref 0.52–1.04)
GFR SERPL CREATININE-BSD FRML MDRD: 71 ML/MIN/1.73M2
GLUCOSE BLD-MCNC: 104 MG/DL (ref 70–99)
HBA1C MFR BLD: 6.8 % (ref 0–5.6)
POTASSIUM BLD-SCNC: 4.6 MMOL/L (ref 3.4–5.3)
PROT SERPL-MCNC: 7.8 G/DL (ref 6.8–8.8)
SODIUM SERPL-SCNC: 139 MMOL/L (ref 133–144)

## 2022-04-14 PROCEDURE — 36415 COLL VENOUS BLD VENIPUNCTURE: CPT

## 2022-04-14 PROCEDURE — 83036 HEMOGLOBIN GLYCOSYLATED A1C: CPT

## 2022-04-14 PROCEDURE — 80053 COMPREHEN METABOLIC PANEL: CPT

## 2022-07-08 DIAGNOSIS — E66.01 MORBID OBESITY DUE TO EXCESS CALORIES (H): Chronic | ICD-10-CM

## 2022-07-12 RX ORDER — TOPIRAMATE 100 MG/1
TABLET, FILM COATED ORAL
Qty: 180 TABLET | Refills: 1 | Status: SHIPPED | OUTPATIENT
Start: 2022-07-12 | End: 2023-01-25

## 2022-08-20 ENCOUNTER — HEALTH MAINTENANCE LETTER (OUTPATIENT)
Age: 62
End: 2022-08-20

## 2022-09-14 ENCOUNTER — MYC MEDICAL ADVICE (OUTPATIENT)
Dept: FAMILY MEDICINE | Facility: CLINIC | Age: 62
End: 2022-09-14

## 2022-09-14 NOTE — TELEPHONE ENCOUNTER
Called pt to schedule appt sooner.  Appt made.    Future Appointments 9/14/2022 - 3/13/2023      Date Visit Type Length Department Provider     10/12/2022 10:30 AM OFFICE VISIT 30 min RM Concha Bolaños MD    Location Instructions:     [facility] is located at [address] [address], near [address] This is a half mile west of [address]. If traveling west on [address], turn south onto [address], then west onto [address] [place] [address] to reach the parking lot. If traveling east on [address], turn south directly onto [address].                 Anna BAJWA RN, BSN  [facility]

## 2022-09-20 NOTE — TELEPHONE ENCOUNTER
Faxed signed [facility] script to [phone] stating we have not prescribed the lexapro since 7/6/21. Please contact pt if he is still taking and who is prescribing. Received fax confirmation. Pt needs DM appt with PCP.    Please call to schedule    Medication is being filled for 1 time refill only due to:  Patient needs to be seen because needs visit.   Jl Dey RN, BSN

## 2022-10-05 ENCOUNTER — TRANSFERRED RECORDS (OUTPATIENT)
Dept: HEALTH INFORMATION MANAGEMENT | Facility: CLINIC | Age: 62
End: 2022-10-05

## 2022-10-12 ENCOUNTER — OFFICE VISIT (OUTPATIENT)
Dept: FAMILY MEDICINE | Facility: CLINIC | Age: 62
End: 2022-10-12
Payer: COMMERCIAL

## 2022-10-12 VITALS
HEART RATE: 85 BPM | TEMPERATURE: 98.2 F | RESPIRATION RATE: 20 BRPM | HEIGHT: 65 IN | DIASTOLIC BLOOD PRESSURE: 82 MMHG | BODY MASS INDEX: 37.44 KG/M2 | OXYGEN SATURATION: 97 % | WEIGHT: 224.7 LBS | SYSTOLIC BLOOD PRESSURE: 120 MMHG

## 2022-10-12 DIAGNOSIS — Z12.11 SPECIAL SCREENING FOR MALIGNANT NEOPLASMS, COLON: ICD-10-CM

## 2022-10-12 DIAGNOSIS — F33.1 MAJOR DEPRESSIVE DISORDER, RECURRENT EPISODE, MODERATE (H): ICD-10-CM

## 2022-10-12 DIAGNOSIS — E11.22 TYPE 2 DIABETES MELLITUS WITH STAGE 3 CHRONIC KIDNEY DISEASE, WITHOUT LONG-TERM CURRENT USE OF INSULIN, UNSPECIFIED WHETHER STAGE 3A OR 3B CKD (H): ICD-10-CM

## 2022-10-12 DIAGNOSIS — Z12.31 VISIT FOR SCREENING MAMMOGRAM: ICD-10-CM

## 2022-10-12 DIAGNOSIS — Z00.00 PREVENTATIVE HEALTH CARE: Primary | ICD-10-CM

## 2022-10-12 DIAGNOSIS — N89.8 VAGINAL DRYNESS: ICD-10-CM

## 2022-10-12 DIAGNOSIS — N18.30 TYPE 2 DIABETES MELLITUS WITH STAGE 3 CHRONIC KIDNEY DISEASE, WITHOUT LONG-TERM CURRENT USE OF INSULIN, UNSPECIFIED WHETHER STAGE 3A OR 3B CKD (H): ICD-10-CM

## 2022-10-12 DIAGNOSIS — I10 HTN, GOAL BELOW 140/90: ICD-10-CM

## 2022-10-12 DIAGNOSIS — E78.5 HYPERLIPIDEMIA LDL GOAL <100: ICD-10-CM

## 2022-10-12 LAB
ERYTHROCYTE [DISTWIDTH] IN BLOOD BY AUTOMATED COUNT: 15.9 % (ref 10–15)
HBA1C MFR BLD: 6.8 % (ref 0–5.6)
HCT VFR BLD AUTO: 48.8 % (ref 35–47)
HGB BLD-MCNC: 15.4 G/DL (ref 11.7–15.7)
MCH RBC QN AUTO: 29.1 PG (ref 26.5–33)
MCHC RBC AUTO-ENTMCNC: 31.6 G/DL (ref 31.5–36.5)
MCV RBC AUTO: 92 FL (ref 78–100)
PLATELET # BLD AUTO: 187 10E3/UL (ref 150–450)
RBC # BLD AUTO: 5.3 10E6/UL (ref 3.8–5.2)
WBC # BLD AUTO: 8.4 10E3/UL (ref 4–11)

## 2022-10-12 PROCEDURE — 96127 BRIEF EMOTIONAL/BEHAV ASSMT: CPT | Performed by: FAMILY MEDICINE

## 2022-10-12 PROCEDURE — 36415 COLL VENOUS BLD VENIPUNCTURE: CPT | Performed by: FAMILY MEDICINE

## 2022-10-12 PROCEDURE — 90471 IMMUNIZATION ADMIN: CPT | Performed by: FAMILY MEDICINE

## 2022-10-12 PROCEDURE — 90472 IMMUNIZATION ADMIN EACH ADD: CPT | Performed by: FAMILY MEDICINE

## 2022-10-12 PROCEDURE — 80061 LIPID PANEL: CPT | Performed by: FAMILY MEDICINE

## 2022-10-12 PROCEDURE — 84443 ASSAY THYROID STIM HORMONE: CPT | Performed by: FAMILY MEDICINE

## 2022-10-12 PROCEDURE — 83036 HEMOGLOBIN GLYCOSYLATED A1C: CPT | Performed by: FAMILY MEDICINE

## 2022-10-12 PROCEDURE — 80053 COMPREHEN METABOLIC PANEL: CPT | Performed by: FAMILY MEDICINE

## 2022-10-12 PROCEDURE — 90682 RIV4 VACC RECOMBINANT DNA IM: CPT | Performed by: FAMILY MEDICINE

## 2022-10-12 PROCEDURE — 0124A COVID-19,PF,PFIZER BOOSTER BIVALENT: CPT | Performed by: FAMILY MEDICINE

## 2022-10-12 PROCEDURE — 90677 PCV20 VACCINE IM: CPT | Performed by: FAMILY MEDICINE

## 2022-10-12 PROCEDURE — 85027 COMPLETE CBC AUTOMATED: CPT | Performed by: FAMILY MEDICINE

## 2022-10-12 PROCEDURE — 91312 COVID-19,PF,PFIZER BOOSTER BIVALENT: CPT | Performed by: FAMILY MEDICINE

## 2022-10-12 PROCEDURE — 99396 PREV VISIT EST AGE 40-64: CPT | Mod: 25 | Performed by: FAMILY MEDICINE

## 2022-10-12 PROCEDURE — 99214 OFFICE O/P EST MOD 30 MIN: CPT | Mod: 25 | Performed by: FAMILY MEDICINE

## 2022-10-12 RX ORDER — ESTRADIOL 10 UG/1
10 INSERT VAGINAL
Qty: 24 TABLET | Refills: 3 | Status: SHIPPED | OUTPATIENT
Start: 2022-10-13 | End: 2023-02-27

## 2022-10-12 RX ORDER — GLIMEPIRIDE 4 MG/1
4 TABLET ORAL
Qty: 90 TABLET | Refills: 1 | Status: SHIPPED | OUTPATIENT
Start: 2022-10-12 | End: 2023-05-01

## 2022-10-12 RX ORDER — BUPROPION HYDROCHLORIDE 300 MG/1
300 TABLET ORAL EVERY MORNING
Qty: 90 TABLET | Refills: 1 | Status: SHIPPED | OUTPATIENT
Start: 2022-10-12 | End: 2023-05-23

## 2022-10-12 RX ORDER — LISINOPRIL 5 MG/1
5 TABLET ORAL DAILY
Qty: 90 TABLET | Refills: 1 | Status: SHIPPED | OUTPATIENT
Start: 2022-10-12 | End: 2023-05-23

## 2022-10-12 RX ORDER — PIOGLITAZONEHYDROCHLORIDE 30 MG/1
30 TABLET ORAL DAILY
Qty: 90 TABLET | Refills: 1 | Status: SHIPPED | OUTPATIENT
Start: 2022-10-12 | End: 2023-05-23

## 2022-10-12 RX ORDER — GLIMEPIRIDE 2 MG/1
2 TABLET ORAL
Qty: 90 TABLET | Refills: 1 | Status: CANCELLED | OUTPATIENT
Start: 2022-10-12

## 2022-10-12 RX ORDER — ATORVASTATIN CALCIUM 10 MG/1
10 TABLET, FILM COATED ORAL DAILY
Qty: 90 TABLET | Refills: 1 | Status: SHIPPED | OUTPATIENT
Start: 2022-10-12 | End: 2023-05-23

## 2022-10-12 ASSESSMENT — PATIENT HEALTH QUESTIONNAIRE - PHQ9
10. IF YOU CHECKED OFF ANY PROBLEMS, HOW DIFFICULT HAVE THESE PROBLEMS MADE IT FOR YOU TO DO YOUR WORK, TAKE CARE OF THINGS AT HOME, OR GET ALONG WITH OTHER PEOPLE: NOT DIFFICULT AT ALL
SUM OF ALL RESPONSES TO PHQ QUESTIONS 1-9: 13
SUM OF ALL RESPONSES TO PHQ QUESTIONS 1-9: 13

## 2022-10-12 ASSESSMENT — PAIN SCALES - GENERAL: PAINLEVEL: NO PAIN (0)

## 2022-10-12 NOTE — PROGRESS NOTES
SUBJECTIVE:   CC: Radha is an 62 year old who presents for preventive health visit.   Patient has been advised of split billing requirements and indicates understanding: Yes   Healthy Habits:     Getting at least 3 servings of Calcium per day:  Yes    Bi-annual eye exam:  Yes    Dental care twice a year:  Yes    Sleep apnea or symptoms of sleep apnea:  None    Diet:  Regular (no restrictions)    Frequency of exercise:  None    Duration of exercise:  N/A    Taking medications regularly:  0    Barriers to taking medications:  None    Medication side effects:  None    PHQ-2 Total Score: 4    Additional concerns today:  Yes (bump on hand, and diabetic meter)  History of Present Illness       Diabetes:   She presents for follow up of diabetes.  She is checking home blood glucose one time daily. She checks blood glucose before meals.  Blood glucose is never over 200 and never under 70. She is aware of hypoglycemia symptoms including shakiness, dizziness and blurred vision. She is concerned about other. She is having excessive thirst and weight gain.         She eats 0-1 servings of fruits and vegetables daily.She consumes 1 sweetened beverage(s) daily.She exercises with enough effort to increase her heart rate 9 or less minutes per day.  She exercises with enough effort to increase her heart rate 3 or less days per week.   She is taking medications regularly.  She is not taking prescribed medications regularly due to None.    Today's PHQ-9         PHQ-9 Total Score: 13    PHQ-9 Q9 Thoughts of better off dead/self-harm past 2 weeks :   Not at all    How difficult have these problems made it for you to do your work, take care of things at home, or get along with other people: Not difficult at all  taking topamax(not  for weight loss), feel her weight up and causing her to be more depressed.  Diet soda has increased. Does eat after 7 pm, snacking, to often. Nothing good for    Headaches controlled on  topamax      Diabetes, changed meters and not working right. gaining weight since on the Amaryl.   The test strips will say error half the time.  Lab Results   Component Value Date    A1C 6.8 2022    A1C 9.6 2022    A1C 8.5 2021    A1C 8.9 2021    A1C 7.6 2020    A1C 8.0 2020    A1C 9.4 2020     Has a lump on palm side of right hand, for months or even 1 yr , not getting worse and not painful,  Moves with her middle finger, she forgets about it mostly.    Has questions about hemorrhoid, has bathroom bouts and goes to bathroom, and will get cramps at times, and then cant go until eventually she does. This can happen over 3 hrs, and sometimes will strain to have BM, knows her hemorrhoids can be caused by that.        Today's PHQ-2 Score:   PHQ-2 (  [employer]) 2022   Q1: Little interest or pleasure in doing things 1   Q2: Feeling down, depressed or hopeless 1   PHQ-2 Score 2   PHQ-2 Total Score (12-17 Years)- Positive if 3 or more points; Administer PHQ-A if positive -   Q1: Little interest or pleasure in doing things Several days   Q2: Feeling down, depressed or hopeless Several days   PHQ-2 Score 2       Abuse: Current or Past (Physical, Sexual or Emotional) - No  Do you feel safe in your environment? Yes      Social History     Tobacco Use     Smoking status: Former     Packs/day: 1.00     Years: 5.00     Pack years: 5.00     Types: Cigarettes     Quit date: 1985     Years since quittin.7     Smokeless tobacco: Never     Tobacco comments:     quit in  smoked one pack a day   Substance Use Topics     Alcohol use: Yes     Comment: Minimal     If you drink alcohol do you typically have >3 drinks per day or >7 drinks per week? No    Alcohol Use 2021   Prescreen: >3 drinks/day or >7 drinks/week? No   Prescreen: >3 drinks/day or >7 drinks/week? -       Reviewed orders with patient.  Reviewed health maintenance and updated orders accordingly - Yes  BP  Readings from Last 3 Encounters:   10/12/22 120/82   01/06/22 124/76   07/01/21 104/68    Wt Readings from Last 3 Encounters:   10/12/22 101.9 kg (224 lb 11.2 oz)   07/01/21 92.7 kg (204 lb 4.8 oz)   04/10/20 83 kg (183 lb)                  Recent Labs   Lab Test 10/12/22  1138 04/14/22  0958 01/06/22  1024 01/06/22  1024 07/01/21  1115 04/28/21  1556 12/07/20  0838   A1C 6.8* 6.8*  --  9.6* 8.5*   < > 7.6*   LDL 49  --   --  53 56  --   --    HDL 60  --   --  70 76  --   --    TRIG 93  --   --  85 73  --   --    ALT 25 24  --  22 25  --  20   CR 0.80 0.92   < > 0.76 0.95  --  0.86   GFRESTIMATED 83 71   < > 89 65  --  74   GFRESTBLACK  --   --   --   --  75  --  86   POTASSIUM 4.7 4.6   < > 3.8 4.1  --  4.2   TSH 1.15  --   --  1.10 1.16  --  1.74    < > = values in this interval not displayed.        Breast Cancer Screening:    Breast CA Risk Assessment (FHS-7) 6/28/2021   Do you have a family history of breast, colon, or ovarian cancer? No / Unknown         Mammogram Screening: Recommended mammography every 1-2 years with patient discussion and risk factor consideration  Pertinent mammograms are reviewed under the imaging tab.    History of abnormal Pap smear: NO - age 30-65 PAP every 5 years with negative HPV co-testing recommended  PAP / HPV Latest Ref Rng & Units 7/1/2021 6/17/2016 1/24/2013   PAP (Historical) - NIL NIL NIL   HPV16 NEG:Negative Negative Negative -   HPV18 NEG:Negative Negative Negative -   HRHPV NEG:Negative Negative Negative -     Reviewed and updated as needed this visit by clinical staff   Tobacco  Allergies  Meds   Med Hx  Surg Hx  Fam Hx  Soc Hx        Reviewed and updated as needed this visit by Provider                     Review of Systems  CONSTITUTIONAL: NEGATIVE for fever, chills, change in weight  INTEGUMENTARY/SKIN: NEGATIVE for worrisome rashes, moles or lesions  EYES: NEGATIVE for vision changes or irritation  ENT: NEGATIVE for ear, mouth and throat problems  RESP:  "NEGATIVE for significant cough or SOB  BREAST: NEGATIVE for masses, tenderness or discharge  CV: NEGATIVE for chest pain, palpitations or peripheral edema  GI: NEGATIVE for nausea, abdominal pain, heartburn, or change in bowel habits  : NEGATIVE for unusual urinary or vaginal symptoms. No vaginal bleeding.  MUSCULOSKELETAL: NEGATIVE for significant arthralgias or myalgia  NEURO: NEGATIVE for weakness, dizziness or paresthesias  PSYCHIATRIC: NEGATIVE for changes in mood or affect      OBJECTIVE:   /82 (BP Location: Right arm, Patient Position: Sitting, Cuff Size: Adult Large)   Pulse 85   Temp 98.2  F (36.8  C) (Oral)   Resp 20   Ht 1.639 m (5' 4.53\")   Wt 101.9 kg (224 lb 11.2 oz)   LMP 10/03/2010   SpO2 97%   BMI 37.94 kg/m    Physical Exam  GENERAL: healthy, alert and no distress  EYES: Eyes grossly normal to inspection, PERRL and conjunctivae and sclerae normal  HENT: ear canals and TM's normal, nose and mouth without ulcers or lesions  NECK: no adenopathy, no asymmetry, masses, or scars and thyroid normal to palpation  RESP: lungs clear to auscultation - no rales, rhonchi or wheezes  BREAST: normal without masses, tenderness or nipple discharge and no palpable axillary masses or adenopathy  CV: regular rate and rhythm, normal S1 S2, no S3 or S4, no murmur, click or rub, no peripheral edema and peripheral pulses strong  ABDOMEN: soft, nontender, no hepatosplenomegaly, no masses and bowel sounds normal  MS: no gross musculoskeletal defects noted, no edema  SKIN: no suspicious lesions or rashes  NEURO: Normal strength and tone, mentation intact and speech normal  PSYCH: mentation appears normal, affect normal/bright    Diagnostic Test Results:  Labs reviewed in Epic    ASSESSMENT/PLAN:   (Z00.00) Preventative health care  (primary encounter diagnosis)  Comment: normal exam today, due for labs, reviewed normal  Plan: see labs    (F33.1) Major depressive disorder, recurrent episode, moderate " "(H)  Comment: refilled, doing well  Plan: buPROPion (WELLBUTRIN XL) 300 MG 24 hr tablet,         OFFICE/OUTPT VISIT,EST,LEVL III            (E11.22,  N18.30) Type 2 diabetes mellitus with stage 3 chronic kidney disease, without long-term current use of insulin, unspecified whether stage 3a or 3b CKD (H)  Comment: due forlabs, refilled meds  Plan: HEMOGLOBIN A1C, pioglitazone (ACTOS) 30 MG         tablet, empagliflozin (JARDIANCE) 25 MG TABS         tablet, AMB Adult Diabetes Educator Referral,         glimepiride (AMARYL) 4 MG tablet, CBC with         platelets, TSH with free T4 reflex,         OFFICE/OUTPT VISIT,EST,LEVL III            (E78.5) Hyperlipidemia LDL goal <100  Comment: refilled, doing well, no side effects   Plan: atorvastatin (LIPITOR) 10 MG tablet,         OFFICE/OUTPT VISIT,EST,LEVL III            (I10) HTN, goal below 140/90  Comment: controlled, cont same, checking labs  Plan: COMPREHENSIVE METABOLIC PANEL, lisinopril         (ZESTRIL) 5 MG tablet, OFFICE/OUTPT         VISIT,EST,LEVL III            (N89.8) Vaginal dryness  Comment:   Plan: estradiol (VAGIFEM) 10 MCG TABS vaginal tablet,        OFFICE/OUTPT VISIT,EST,LEVL III            (Z12.11) Special screening for malignant neoplasms, colon  Comment:   Plan: Colonoscopy Screening  Referral            (Z12.31) Visit for screening mammogram  Comment:   Plan: CANCELED: MA SCREENING DIGITAL BILAT - Future          (s+30)              Patient has been advised of split billing requirements and indicates understanding: Yes    COUNSELING:  Reviewed preventive health counseling, as reflected in patient instructions       Regular exercise       Healthy diet/nutrition    Estimated body mass index is 37.94 kg/m  as calculated from the following:    Height as of this encounter: 1.639 m (5' 4.53\").    Weight as of this encounter: 101.9 kg (224 lb 11.2 oz).    Weight management plan: Discussed healthy diet and exercise guidelines    She reports that " she quit smoking about 37 years ago. Her smoking use included cigarettes. She has a 5.00 pack-year smoking history. She has never used smokeless tobacco.      Counseling Resources:  ATP IV Guidelines  Pooled Cohorts Equation Calculator  Breast Cancer Risk Calculator  BRCA-Related Cancer Risk Assessment: FHS-7 Tool  FRAX Risk Assessment  ICSI Preventive Guidelines  Dietary Guidelines for Americans, 2010  [employer] MyPlate  ASA Prophylaxis  Lung CA Screening    Concha Lucas MD  [facility]

## 2022-10-13 LAB
ALBUMIN SERPL-MCNC: 3.7 G/DL (ref 3.4–5)
ALP SERPL-CCNC: 97 U/L (ref 40–150)
ALT SERPL W P-5'-P-CCNC: 25 U/L (ref 0–50)
ANION GAP SERPL CALCULATED.3IONS-SCNC: 8 MMOL/L (ref 3–14)
AST SERPL W P-5'-P-CCNC: 29 U/L (ref 0–45)
BILIRUB SERPL-MCNC: 0.7 MG/DL (ref 0.2–1.3)
BUN SERPL-MCNC: 27 MG/DL (ref 7–30)
CALCIUM SERPL-MCNC: 9.1 MG/DL (ref 8.5–10.1)
CHLORIDE BLD-SCNC: 110 MMOL/L (ref 94–109)
CHOLEST SERPL-MCNC: 128 MG/DL
CO2 SERPL-SCNC: 19 MMOL/L (ref 20–32)
CREAT SERPL-MCNC: 0.8 MG/DL (ref 0.52–1.04)
FASTING STATUS PATIENT QL REPORTED: YES
GFR SERPL CREATININE-BSD FRML MDRD: 83 ML/MIN/1.73M2
GLUCOSE BLD-MCNC: 93 MG/DL (ref 70–99)
HDLC SERPL-MCNC: 60 MG/DL
LDLC SERPL CALC-MCNC: 49 MG/DL
NONHDLC SERPL-MCNC: 68 MG/DL
POTASSIUM BLD-SCNC: 4.7 MMOL/L (ref 3.4–5.3)
PROT SERPL-MCNC: 7.7 G/DL (ref 6.8–8.8)
SODIUM SERPL-SCNC: 137 MMOL/L (ref 133–144)
TRIGL SERPL-MCNC: 93 MG/DL
TSH SERPL DL<=0.005 MIU/L-ACNC: 1.15 MU/L (ref 0.4–4)

## 2022-10-25 ENCOUNTER — ANCILLARY PROCEDURE (OUTPATIENT)
Dept: MAMMOGRAPHY | Facility: CLINIC | Age: 62
End: 2022-10-25
Attending: FAMILY MEDICINE
Payer: COMMERCIAL

## 2022-10-25 DIAGNOSIS — Z12.31 VISIT FOR SCREENING MAMMOGRAM: ICD-10-CM

## 2022-10-25 PROCEDURE — 77063 BREAST TOMOSYNTHESIS BI: CPT | Mod: TC | Performed by: RADIOLOGY

## 2022-10-25 PROCEDURE — 77067 SCR MAMMO BI INCL CAD: CPT | Mod: TC | Performed by: RADIOLOGY

## 2022-11-01 ENCOUNTER — ALLIED HEALTH/NURSE VISIT (OUTPATIENT)
Dept: EDUCATION SERVICES | Facility: CLINIC | Age: 62
End: 2022-11-01
Attending: FAMILY MEDICINE
Payer: COMMERCIAL

## 2022-11-01 DIAGNOSIS — N18.30 TYPE 2 DIABETES MELLITUS WITH STAGE 3 CHRONIC KIDNEY DISEASE, WITHOUT LONG-TERM CURRENT USE OF INSULIN, UNSPECIFIED WHETHER STAGE 3A OR 3B CKD (H): Primary | ICD-10-CM

## 2022-11-01 DIAGNOSIS — E11.22 TYPE 2 DIABETES MELLITUS WITH STAGE 3 CHRONIC KIDNEY DISEASE, WITHOUT LONG-TERM CURRENT USE OF INSULIN, UNSPECIFIED WHETHER STAGE 3A OR 3B CKD (H): Primary | ICD-10-CM

## 2022-11-01 PROCEDURE — G0108 DIAB MANAGE TRN  PER INDIV: HCPCS | Performed by: DIETITIAN, REGISTERED

## 2022-11-01 RX ORDER — BLOOD SUGAR DIAGNOSTIC
STRIP MISCELLANEOUS
Qty: 50 STRIP | Refills: 11 | Status: SHIPPED | OUTPATIENT
Start: 2022-11-01 | End: 2023-02-27

## 2022-11-01 NOTE — PROGRESS NOTES
"Radha was seen for diabetes care:  She is having trouble with her meter: has Accu-Chek Guide about 1 year old, had to change from One Touch last year per insurance.  She does not like the meter well, it feels \"flimsy and cheap\".   She describes then demonstrates: she puts a strip into meter and gets a message saying to try again with a new strip and E-3 error. She can tell by feeling the strip that it won't work, \"it's too thin\".  Understands this E3 could mean \"high blood glucose\" per online search, but A1C 6.8% so unlikely.    Radha describes this happens consistently, at least once of every 5 tries she wastes strips so has stopped checking.     During demo today she correctly performs BG check, but gets the error message with her strip.  ~ control solution also results in error    Using a strip from clinic, successfully gets result of .  Provided new Accu-Chek Guide Me meter for her to try to see if she thinks it is more sturdy, same strips are used.   ~ reviewed her strips are 'bad', will need to  new Rx for Accu-Check Guide strips, she is concerned they won't work- there is no way to predict    Also provided One Touch Verio Flex meter and will order strips. Uncertain if covered as well by insurance but she was happier and more confident in past with that meter.     Advised she can likely  only 1 strip Rx so she can try both meters and decide which she prefers, then check cost at pharm.     She voices understanding.   Laure Tan RD, LD, [facility] [place]  Time spent 35 miknutes  "

## 2022-11-01 NOTE — LETTER
"    11/1/2022         RE: Susan Dietrich  [id number] [place], [place] [id number]        Dear Colleague,    Thank you for referring your patient, Susan Dietrich, to the [facility]. Please see a copy of my visit note below.    Radha was seen for diabetes care:  She is having trouble with her meter: has Accu-Chek Guide about 1 year old, had to change from One Touch last year per insurance.  She does not like the meter well, it feels \"flimsy and cheap\".   She describes then demonstrates: she puts a strip into meter and gets a message saying to try again with a new strip and E-3 error. She can tell by feeling the strip that it won't work, \"it's too thin\".  Understands this E3 could mean \"high blood glucose\" per online search, but A1C 6.8% so unlikely.    Radha describes this happens consistently, at least once of every 5 tries she wastes strips so has stopped checking.     During demo today she correctly performs BG check, but gets the error message with her strip.  ~ control solution also results in error    Using a strip from clinic, successfully gets result of .  Provided new Accu-Chek Guide Me meter for her to try to see if she thinks it is more sturdy, same strips are used.   ~ reviewed her strips are 'bad', will need to  new Rx for Accu-Check Guide strips, she is concerned they won't work- there is no way to predict    Also provided One Touch Verio Flex meter and will order strips. Uncertain if covered as well by insurance but she was happier and more confident in past with that meter.     Advised she can likely  only 1 strip Rx so she can try both meters and decide which she prefers, then check cost at pharm.     She voices understanding.   Laure Tan RD, LD, CDCES          "

## 2022-11-04 ENCOUNTER — TELEPHONE (OUTPATIENT)
Dept: GASTROENTEROLOGY | Facility: CLINIC | Age: 62
End: 2022-11-04

## 2022-11-04 ENCOUNTER — MYC MEDICAL ADVICE (OUTPATIENT)
Dept: CALL CENTER | Age: 62
End: 2022-11-04

## 2022-11-04 NOTE — TELEPHONE ENCOUNTER
Screening Questions  BLUE  KIND OF PREP RED  LOCATION [review exclusion criteria] GREEN  SEDATION TYPE        Y Are you active on mychart?       KEDAR Ordering/Referring Provider?        HP What type of coverage do you have?      N Have you had a positive covid test in the last 90 days?        Date: AUG 1ST    1. 36.6  BMI  [BMI 40+ - review exclusion criteria]  2. Y  Are you able to give consent for your medical care? [RN REVIEW?]        3. N  Are you taking any prescription pain medications on a routine schedule?        3a.  EXTENDED PREP What kind of prescription?   4. N Do you have any chemical dependencies such as alcohol, street drugs, or methadone?    5. N Do you have any history of post-traumatic stress syndrome, severe anxiety or history of psychosis?      **If yes 2- 5 , please schedule with MAC sedation.**    BMI OVER 40 NEED PAC EVALUATION FOR [facility]          IF YES TO ANY 6 - 10 - HOSPITAL SETTING ONLY.     6.   N Do you need assistance transferring?     7.   N Have you had a heart or lung transplant?    8.   N Are you currently on dialysis?   9.   N Do you use daily home oxygen?   10. N Do you take nitroglycerin?   10a.  If yes, how often?     11. [FEMALES]   Are you currently pregnant?    11a.  If yes, how many weeks? [ Greater than 12 weeks, OR NEEDED]    12. N Do you have Pulmonary Hypertension? *NEED PAC APPT AT [facility]*     13. N [review exclusion criteria]  Do you have any implantable devices in your body (pacemaker, defib, LVAD)?    14. N In the past 6 months, have you had any heart related issues including cardiomyopathy or heart attack?     14a.  If yes, did it require cardiac stenting if so when?     15. N Have you had a stroke or Transient ischemic attack (TIA - aka  mini stroke ) within 6 months?      16. N Do you have mod to severe Obstructive Sleep Apnea?  [Hospital only - Ok at [place]]    17. N Do you have SEVERE AND UNCONTROLLED asthma?     18. N Are you currently taking any blood  "thinners?     18a. If yes, inform patient to \"follow up w/ ordering provider for bridging instructions.\"    19. N Do you take the medication Phentermine?    19a. If yes, \"Hold for 7 days before procedure.  Please consult your prescribing provider if you have questions about holding this medication.\"     20. N  Do you have chronic kidney disease?      21. Y  Do you have a diagnosis of diabetes?     22. Y  On a regular basis do you go 3-5 days between bowel movements?     23.  Preferred LOCAL Pharmacy for Pre Prescription    [ LIST ONLY ONE PHARMACY]      [place], [place] - [address]    - CLOSING REMINDERS -    Informed patient they will need an adult    Cannot take any type of public or medical transportation alone    Conscious Sedation- Needs  for 6 hours after the procedure       MAC/General-Needs  for 24 hours after procedure    Pre-Procedure Covid test to be completed [[facility] PCR Testing Required]    Confirmed Nurse will call to complete pre-assessment       - SCHEDULING DETAILS -     YAHAIRA  Surgeon    1/30  Date of Procedure  Lower Endoscopy [Colonoscopy]  Type of Procedure Scheduled   [facility]-If you answer yes to questions #8, #20, #21Which Colonoscopy Prep was Sent?     CS Sedation Type     N PAC / Pre-op Required         Additional comments:        "

## 2023-01-06 RX ORDER — BISACODYL 5 MG
TABLET, DELAYED RELEASE (ENTERIC COATED) ORAL
Qty: 4 TABLET | Refills: 0 | Status: SHIPPED | OUTPATIENT
Start: 2023-01-06 | End: 2023-02-27

## 2023-01-30 ENCOUNTER — HOSPITAL ENCOUNTER (OUTPATIENT)
Facility: CLINIC | Age: 63
Discharge: HOME | End: 2023-01-30
Attending: INTERNAL MEDICINE | Admitting: INTERNAL MEDICINE
Payer: COMMERCIAL

## 2023-01-30 VITALS
TEMPERATURE: 98 F | BODY MASS INDEX: 37.32 KG/M2 | DIASTOLIC BLOOD PRESSURE: 69 MMHG | SYSTOLIC BLOOD PRESSURE: 113 MMHG | OXYGEN SATURATION: 97 % | HEIGHT: 65 IN | WEIGHT: 224 LBS | RESPIRATION RATE: 16 BRPM | HEART RATE: 78 BPM

## 2023-01-30 DIAGNOSIS — Z12.11 SPECIAL SCREENING FOR MALIGNANT NEOPLASMS, COLON: Primary | ICD-10-CM

## 2023-01-30 LAB
COLONOSCOPY: NORMAL
GLUCOSE BLDC GLUCOMTR-MCNC: 96 MG/DL (ref 70–99)

## 2023-01-30 PROCEDURE — 250N000011 HC RX IP 250 OP 636: Performed by: INTERNAL MEDICINE

## 2023-01-30 PROCEDURE — G0121 COLON CA SCRN NOT HI RSK IND: HCPCS | Performed by: INTERNAL MEDICINE

## 2023-01-30 PROCEDURE — 82962 GLUCOSE BLOOD TEST: CPT

## 2023-01-30 PROCEDURE — G0500 MOD SEDAT ENDO SERVICE >5YRS: HCPCS | Performed by: INTERNAL MEDICINE

## 2023-01-30 PROCEDURE — 99153 MOD SED SAME PHYS/QHP EA: CPT | Performed by: INTERNAL MEDICINE

## 2023-01-30 PROCEDURE — 45378 DIAGNOSTIC COLONOSCOPY: CPT | Performed by: INTERNAL MEDICINE

## 2023-01-30 RX ORDER — DIPHENHYDRAMINE HYDROCHLORIDE 50 MG/ML
25-50 INJECTION INTRAMUSCULAR; INTRAVENOUS
Status: DISCONTINUED | OUTPATIENT
Start: 2023-01-30 | End: 2023-01-30 | Stop reason: HOSPADM

## 2023-01-30 RX ORDER — ATROPINE SULFATE 0.1 MG/ML
1 INJECTION INTRAVENOUS
Status: DISCONTINUED | OUTPATIENT
Start: 2023-01-30 | End: 2023-01-30 | Stop reason: HOSPADM

## 2023-01-30 RX ORDER — ONDANSETRON 4 MG/1
4 TABLET, ORALLY DISINTEGRATING ORAL EVERY 6 HOURS PRN
Status: DISCONTINUED | OUTPATIENT
Start: 2023-01-30 | End: 2023-01-30 | Stop reason: HOSPADM

## 2023-01-30 RX ORDER — SIMETHICONE 40MG/0.6ML
133 SUSPENSION, DROPS(FINAL DOSAGE FORM)(ML) ORAL
Status: DISCONTINUED | OUTPATIENT
Start: 2023-01-30 | End: 2023-01-30 | Stop reason: HOSPADM

## 2023-01-30 RX ORDER — FLUMAZENIL 0.1 MG/ML
0.2 INJECTION, SOLUTION INTRAVENOUS
Status: DISCONTINUED | OUTPATIENT
Start: 2023-01-30 | End: 2023-01-30 | Stop reason: HOSPADM

## 2023-01-30 RX ORDER — FENTANYL CITRATE 50 UG/ML
50-100 INJECTION, SOLUTION INTRAMUSCULAR; INTRAVENOUS EVERY 5 MIN PRN
Status: DISCONTINUED | OUTPATIENT
Start: 2023-01-30 | End: 2023-01-30 | Stop reason: HOSPADM

## 2023-01-30 RX ORDER — NALOXONE HYDROCHLORIDE 0.4 MG/ML
0.4 INJECTION, SOLUTION INTRAMUSCULAR; INTRAVENOUS; SUBCUTANEOUS
Status: DISCONTINUED | OUTPATIENT
Start: 2023-01-30 | End: 2023-01-30 | Stop reason: HOSPADM

## 2023-01-30 RX ORDER — ONDANSETRON 2 MG/ML
4 INJECTION INTRAMUSCULAR; INTRAVENOUS EVERY 6 HOURS PRN
Status: DISCONTINUED | OUTPATIENT
Start: 2023-01-30 | End: 2023-01-30 | Stop reason: HOSPADM

## 2023-01-30 RX ORDER — LIDOCAINE 40 MG/G
CREAM TOPICAL
Status: DISCONTINUED | OUTPATIENT
Start: 2023-01-30 | End: 2023-01-30 | Stop reason: HOSPADM

## 2023-01-30 RX ORDER — NALOXONE HYDROCHLORIDE 0.4 MG/ML
0.2 INJECTION, SOLUTION INTRAMUSCULAR; INTRAVENOUS; SUBCUTANEOUS
Status: DISCONTINUED | OUTPATIENT
Start: 2023-01-30 | End: 2023-01-30 | Stop reason: HOSPADM

## 2023-01-30 RX ORDER — PROCHLORPERAZINE MALEATE 10 MG
10 TABLET ORAL EVERY 6 HOURS PRN
Status: DISCONTINUED | OUTPATIENT
Start: 2023-01-30 | End: 2023-01-30 | Stop reason: HOSPADM

## 2023-01-30 RX ORDER — EPINEPHRINE 1 MG/ML
0.1 INJECTION, SOLUTION INTRAMUSCULAR; SUBCUTANEOUS
Status: DISCONTINUED | OUTPATIENT
Start: 2023-01-30 | End: 2023-01-30 | Stop reason: HOSPADM

## 2023-01-30 RX ORDER — ONDANSETRON 2 MG/ML
4 INJECTION INTRAMUSCULAR; INTRAVENOUS
Status: DISCONTINUED | OUTPATIENT
Start: 2023-01-30 | End: 2023-01-30 | Stop reason: HOSPADM

## 2023-01-30 RX ADMIN — MIDAZOLAM HYDROCHLORIDE 1 MG: 1 INJECTION, SOLUTION INTRAMUSCULAR; INTRAVENOUS at 10:06

## 2023-01-30 RX ADMIN — MIDAZOLAM HYDROCHLORIDE 1 MG: 1 INJECTION, SOLUTION INTRAMUSCULAR; INTRAVENOUS at 10:09

## 2023-01-30 RX ADMIN — MIDAZOLAM HYDROCHLORIDE 2 MG: 1 INJECTION, SOLUTION INTRAMUSCULAR; INTRAVENOUS at 09:58

## 2023-01-30 RX ADMIN — FENTANYL CITRATE 50 MCG: 50 INJECTION, SOLUTION INTRAMUSCULAR; INTRAVENOUS at 10:09

## 2023-01-30 RX ADMIN — FENTANYL CITRATE 100 MCG: 50 INJECTION, SOLUTION INTRAMUSCULAR; INTRAVENOUS at 09:58

## 2023-01-30 RX ADMIN — FENTANYL CITRATE 50 MCG: 50 INJECTION, SOLUTION INTRAMUSCULAR; INTRAVENOUS at 10:05

## 2023-01-30 ASSESSMENT — ACTIVITIES OF DAILY LIVING (ADL): ADLS_ACUITY_SCORE: 35

## 2023-01-30 NOTE — DISCHARGE INSTRUCTIONS
The patient has received a copy of the Provation report the doctor has written and discharge instructions have been discussed with the patient and responsible adult.  All questions were addressed and answered prior to patient discharge.     Helical Rim Advancement Flap Text: The defect edges were debeveled with a #15 blade scalpel.  Given the location of the defect and the proximity to free margins (helical rim) a double helical rim advancement flap was deemed most appropriate.  Using a sterile surgical marker, the appropriate advancement flaps were drawn incorporating the defect and placing the expected incisions between the helical rim and antihelix where possible.  The area thus outlined was incised through and through with a #15 scalpel blade.  With a skin hook and iris scissors, the flaps were gently and sharply undermined and freed up.

## 2023-01-30 NOTE — H&P
Pre-Endoscopy History and Physical     Susan Dietrich MRN# [id number]   YOB: 1960 Age: 62 year old     Date of Procedure: 1/30/2023  Primary care provider: Concha Lucas  Type of Endoscopy: Colonoscopy with possible biopsy, possible polypectomy  Reason for Procedure: screen  Type of Anesthesia Anticipated: Conscious Sedation    HPI:    Susan is a 62 year old female who will be undergoing the above procedure.      A history and physical has been performed. The patient's medications and allergies have been reviewed. The risks and benefits of the procedure and the sedation options and risks were discussed with the patient.  All questions were answered and informed consent was obtained.      She denies a personal or family history of anesthesia complications or bleeding disorders.     Patient Active Problem List   Diagnosis     Irritable bowel syndrome     perimenopausal     Migraine     ACTINIC KERATOSIS-left temple     Pain in joint, pelvic region and thigh     EPISTAXIS/intermitent     Vitamin D deficiency     Elevated liver enzymes     Moderate major depression (H)     Health Care Home     Fatty liver     PVD (peripheral vascular disease) (H)     Hyperlipidemia LDL goal <100     Type 2 diabetes, uncontrolled, with neuropathy     HTN, goal below 140/90     High blood triglycerides     obesity (HCC)     Advanced directives, counseling/discussion     Major depressive disorder, recurrent episode, moderate (H)        Past Medical History:   Diagnosis Date     Arthritis      Congestive heart failure, unspecified     Mother     Depressive disorder      Essential hypertension, benign 10/24/2006     Hypermobility syndrome     multiple joint pains     Irritable bowel syndrome     referred to GI 9/05;  nl colonoscopy 12/05     Migraine, unspecified, without mention of intractable migraine without mention of status migrainosus     Migraine     NONSPECIFIC MEDICAL HISTORY 12/05    nl colonoscopy       Other and unspecified malignant neoplasm of skin of other and unspecified parts of face     squamous cell skin CA, face     PVD (peripheral vascular disease) (H)      Type II or unspecified type diabetes mellitus without mention of complication, not stated as uncontrolled         Past Surgical History:   Procedure Laterality Date     ABDOMEN SURGERY       ABDOMEN SURGERY  1981    Appendectomy     APPENDECTOMY       BIOPSY       BREAST SURGERY       CHOLECYSTECTOMY       COLONOSCOPY       HERNIA REPAIR       [facility] NONSPECIFIC PROCEDURE      s/p appendectomy     [facility] NONSPECIFIC PROCEDURE      s/p lumpectomy L breast- benign     [facility] NONSPECIFIC PROCEDURE      cholecystectomy     [facility] NONSPECIFIC PROCEDURE      s/p hernia through zen scar     [facility] NONSPECIFIC PROCEDURE      benign breast lumps       Social History     Tobacco Use     Smoking status: Former     Packs/day: 1.00     Years: 5.00     Pack years: 5.00     Types: Cigarettes     Quit date: 1985     Years since quittin.0     Smokeless tobacco: Never     Tobacco comments:     quit in  smoked one pack a day   Substance Use Topics     Alcohol use: Yes     Comment: Minimal       Family History   Problem Relation Age of Onset     Hypertension Mother      Diabetes Mother      Arthritis Mother      Heart Disease Mother         chf     Dementia Mother      Cardiovascular Father         cerebral hemorrhage     Diabetes Brother      Obesity Brother      Diabetes Brother         passed away MI     Depression/Anxiety Sister      Depression Sister      Mental Illness Sister      Mental Illness Sister      Obesity Sister      Diabetes Sister      Depression Sister      Mental Illness Sister      Obesity Sister      Obesity Sister      Colon Cancer Niece      Cancer - colorectal Maternal Aunt         niece also       Prior to Admission medications    Medication Sig Start Date End Date Taking? Authorizing Provider   aspirin (ASA) 325 MG EC tablet  Take 1 tablet (325 mg) by mouth daily 12/15/21  Yes Concha Lucas MD   atorvastatin (LIPITOR) 10 MG tablet Take 1 tablet (10 mg) by mouth daily 10/12/22  Yes Concha Lucas MD   bisacodyl (DULCOLAX) 5 MG EC tablet Take 2 tablets at 3 pm the day before your procedure. If your procedure is before 11 am, take 2 additional tablets at 11 pm. If your procedure is after 11 am, take 2 additional tablets at 6 am. For additional instructions refer to your colonoscopy prep instructions. 1/6/23  Yes Eddie Grubbs MD   buPROPion (WELLBUTRIN XL) 300 MG 24 hr tablet Take 1 tablet (300 mg) by mouth every morning 10/12/22  Yes Concha Lucas MD   Cholecalciferol 5000 UNIT CAPS Take 1 capsule by mouth daily. 6/1/11  Yes Claire Ngo PA-C   empagliflozin (JARDIANCE) 25 MG TABS tablet Take 1 tablet (25 mg) by mouth daily 10/12/22  Yes Concha Lucas MD   glimepiride (AMARYL) 4 MG tablet Take 1 tablet (4 mg) by mouth every morning (before breakfast) 10/12/22  Yes Concha Lucas MD   lisinopril (ZESTRIL) 5 MG tablet Take 1 tablet (5 mg) by mouth daily 10/12/22  Yes Concha Lucas MD   pioglitazone (ACTOS) 30 MG tablet Take 1 tablet (30 mg) by mouth daily 10/12/22  Yes Concha Lucas MD   polyethylene glycol (GOLYTELY) 236 g suspension The night before the exam at 6 pm drink an 8-ounce glass every 15 minutes until the jug is half empty. If you arrive before 11 AM: Drink the other half of the Golytely jug at 11 PM night before procedure. If you arrive after 11 AM: Drink the other half of the Golytely jug at 6 AM day of procedure. For additional instructions refer to your colonoscopy prep instructions. 1/6/23  Yes Eddie Grubbs MD   alcohol swab prep pads Use to swab area of injection/raj as directed. 4/8/22   Concha Lucas MD   blood glucose (ACCU-CHEK SMARTVIEW) test strip Use to test blood sugar 1 times daily. 4/8/22   Concha Lucas MD  "  blood glucose (NO BRAND SPECIFIED) lancets standard Use to test blood sugar 1 time daily or as directed. 4/8/22   Concha Lucas MD   blood glucose (NO BRAND SPECIFIED) test strip Use to test blood sugar 1 time daily or as directed. 4/8/22   Concha Lucas MD   blood glucose (ONETOUCH VERIO IQ) test strip Use to check blood sugar 1 x daily. 11/1/22   Concha Lucas MD   blood glucose calibration (ACCU-CHEK SMARTVIEW CONTROL) solution Use to calibrate blood glucose monitor as directed. 4/8/22   Concha Lucas MD   blood glucose monitoring (ACCU-CHEK FASTCLIX) lancets Use to test blood sugar 1 times daily or as directed. 4/8/22   Concha Lucas MD   blood glucose monitoring (ACCU-CHEK KALYANI SMARTVIEW) meter device kit Use to test blood sugar 1 times daily. 4/8/22   Concha Lucas MD   blood glucose monitoring (NO BRAND SPECIFIED) meter device kit Use to test blood sugar 1 times daily or as directed. 4/8/22   Concha Lucas MD   Blood Pressure Monitoring (ADULT BLOOD PRESSURE CUFF LG) KIT 1 Device daily  Patient not taking: Reported on 4/8/2022 12/11/20   Concha Lucas MD   estradiol (VAGIFEM) 10 MCG TABS vaginal tablet Place 1 tablet (10 mcg) vaginally twice a week 10/13/22   Concha Lucas MD   topiramate (TOPAMAX) 100 MG tablet TAKE ONE TABLET BY MOUTH TWICE A DAY 1/25/23   Shonna Salgado, APRN CNP       Allergies   Allergen Reactions     Dulaglutide Nausea        REVIEW OF SYSTEMS:   5 point ROS negative except as noted above in HPI, including Gen., Resp., CV, GI &  system review.    PHYSICAL EXAM:   BP (!) 154/78   Pulse 72   Temp 98  F (36.7  C)   Resp 17   Ht 1.651 m (5' 5\")   Wt 101.6 kg (224 lb)   LMP 10/03/2010   SpO2 99%   BMI 37.28 kg/m   Estimated body mass index is 37.28 kg/m  as calculated from the following:    Height as of this encounter: 1.651 m (5' 5\").    Weight as of this encounter: 101.6 kg (224 lb). "   GENERAL APPEARANCE: alert, and oriented  MENTAL STATUS: alert  AIRWAY EXAM: Mallampatti Class I (visualization of the soft palate, fauces, uvula, anterior and posterior pillars)  RESP: lungs clear to auscultation - no rales, rhonchi or wheezes  CV: regular rates and rhythm  DIAGNOSTICS:    Not indicated    IMPRESSION   ASA Class 2 - Mild systemic disease    PLAN:   Plan for Colonoscopy with possible biopsy, possible polypectomy. We discussed the risks, benefits and alternatives and the patient wished to proceed.    The above has been forwarded to the consulting provider.      Signed Electronically by: Eddie Grubbs MD  January 30, 2023

## 2023-02-24 ENCOUNTER — MYC MEDICAL ADVICE (OUTPATIENT)
Dept: EDUCATION SERVICES | Facility: CLINIC | Age: 63
End: 2023-02-24
Payer: COMMERCIAL

## 2023-02-24 ENCOUNTER — MYC MEDICAL ADVICE (OUTPATIENT)
Dept: FAMILY MEDICINE | Facility: CLINIC | Age: 63
End: 2023-02-24
Payer: COMMERCIAL

## 2023-02-24 DIAGNOSIS — N18.30 TYPE 2 DIABETES MELLITUS WITH STAGE 3 CHRONIC KIDNEY DISEASE, WITHOUT LONG-TERM CURRENT USE OF INSULIN, UNSPECIFIED WHETHER STAGE 3A OR 3B CKD (H): Primary | ICD-10-CM

## 2023-02-24 DIAGNOSIS — E11.22 TYPE 2 DIABETES MELLITUS WITH STAGE 3 CHRONIC KIDNEY DISEASE, WITHOUT LONG-TERM CURRENT USE OF INSULIN, UNSPECIFIED WHETHER STAGE 3A OR 3B CKD (H): Primary | ICD-10-CM

## 2023-02-24 RX ORDER — LANCETS
1 EACH MISCELLANEOUS ONCE
Qty: 100 EACH | Refills: 11 | Status: SHIPPED | OUTPATIENT
Start: 2023-02-24 | End: 2023-02-24

## 2023-02-24 RX ORDER — BLOOD-GLUCOSE METER
1 EACH MISCELLANEOUS DAILY
Qty: 1 KIT | Refills: 0 | Status: SHIPPED | OUTPATIENT
Start: 2023-02-24

## 2023-02-24 NOTE — TELEPHONE ENCOUNTER
Pt is scheduled w/ [facility] on 2/27 for Diabetic med check & LT shoulder/arm P. Pt is aware she can not establish care w/ Provider.     Sarah Dunlap

## 2023-02-27 ENCOUNTER — OFFICE VISIT (OUTPATIENT)
Dept: FAMILY MEDICINE | Facility: CLINIC | Age: 63
End: 2023-02-27
Payer: COMMERCIAL

## 2023-02-27 VITALS
SYSTOLIC BLOOD PRESSURE: 121 MMHG | HEART RATE: 74 BPM | RESPIRATION RATE: 16 BRPM | DIASTOLIC BLOOD PRESSURE: 72 MMHG | TEMPERATURE: 97 F | OXYGEN SATURATION: 100 %

## 2023-02-27 DIAGNOSIS — M25.512 CHRONIC LEFT SHOULDER PAIN: Primary | ICD-10-CM

## 2023-02-27 DIAGNOSIS — E11.22 TYPE 2 DIABETES MELLITUS WITH STAGE 3 CHRONIC KIDNEY DISEASE, WITHOUT LONG-TERM CURRENT USE OF INSULIN, UNSPECIFIED WHETHER STAGE 3A OR 3B CKD (H): ICD-10-CM

## 2023-02-27 DIAGNOSIS — N18.30 TYPE 2 DIABETES MELLITUS WITH STAGE 3 CHRONIC KIDNEY DISEASE, WITHOUT LONG-TERM CURRENT USE OF INSULIN, UNSPECIFIED WHETHER STAGE 3A OR 3B CKD (H): ICD-10-CM

## 2023-02-27 DIAGNOSIS — G89.29 CHRONIC LEFT SHOULDER PAIN: Primary | ICD-10-CM

## 2023-02-27 LAB
CREAT UR-MCNC: 162 MG/DL
HBA1C MFR BLD: 7.4 % (ref 0–5.6)
MICROALBUMIN UR-MCNC: 18 MG/L
MICROALBUMIN/CREAT UR: 11.11 MG/G CR (ref 0–25)

## 2023-02-27 PROCEDURE — 99213 OFFICE O/P EST LOW 20 MIN: CPT | Performed by: NURSE PRACTITIONER

## 2023-02-27 PROCEDURE — 82043 UR ALBUMIN QUANTITATIVE: CPT | Performed by: NURSE PRACTITIONER

## 2023-02-27 PROCEDURE — 82570 ASSAY OF URINE CREATININE: CPT | Performed by: NURSE PRACTITIONER

## 2023-02-27 PROCEDURE — 83036 HEMOGLOBIN GLYCOSYLATED A1C: CPT | Performed by: NURSE PRACTITIONER

## 2023-02-27 PROCEDURE — 36415 COLL VENOUS BLD VENIPUNCTURE: CPT | Performed by: NURSE PRACTITIONER

## 2023-02-27 ASSESSMENT — PATIENT HEALTH QUESTIONNAIRE - PHQ9
SUM OF ALL RESPONSES TO PHQ QUESTIONS 1-9: 7
SUM OF ALL RESPONSES TO PHQ QUESTIONS 1-9: 7
10. IF YOU CHECKED OFF ANY PROBLEMS, HOW DIFFICULT HAVE THESE PROBLEMS MADE IT FOR YOU TO DO YOUR WORK, TAKE CARE OF THINGS AT HOME, OR GET ALONG WITH OTHER PEOPLE: NOT DIFFICULT AT ALL

## 2023-02-27 ASSESSMENT — PAIN SCALES - GENERAL: PAINLEVEL: MODERATE PAIN (4)

## 2023-02-27 NOTE — PROGRESS NOTES
Assessment & Plan     Chronic left shoulder pain  Chronic progressing; patient states she has been dealing with this for many years but has recently become intolerable. Discussed RICE and ibuprofen. Patient denies wanting PT at this time. Patient was noted to have some arthritis in shoulder in 2010 discussed management and home care. Patient would like to see ortho sooner than later.     - Orthopedic  Referral; Future    Type 2 diabetes mellitus with stage 3 chronic kidney disease, without long-term current use of insulin, unspecified whether stage 3a or 3b CKD (H)  Chronic stable; no changes at this time; will recheck labs today.     - Albumin Random Urine Quantitative with Creat Ratio; Future  - HEMOGLOBIN A1C; Future    Return in about 6 months (around 8/27/2023) for Routine preventive.    ALETHEA Singh CNP [facility] Garcia is a 62 year old, presenting for the following health issues:  Diabetes      History of Present Illness       Reason for visit:  Diabetes medication check and shoulder/arm pain  Symptom onset:  More than a month  Symptoms include:  Pain  Symptom intensity:  Moderate  Symptom progression:  Staying the same  What makes it worse:  Sleeping and raising arm or lifting  What makes it better:  Allege sometimes    She eats 0-1 servings of fruits and vegetables daily.She consumes 1 sweetened beverage(s) daily.She exercises with enough effort to increase her heart rate 9 or less minutes per day.  She exercises with enough effort to increase her heart rate 3 or less days per week.   She is taking medications regularly.    Today's PHQ-9         PHQ-9 Total Score: 7    PHQ-9 Q9 Thoughts of better off dead/self-harm past 2 weeks :   Not at all    How difficult have these problems made it for you to do your work, take care of things at home, or get along with other people: Not difficult at all     Left shoulder pain   Diabetes Follow-up      How  often are you checking your blood sugar? Not at all-meters are broken, will be picking a new one up.     What concerns do you have today about your diabetes? None     Do you have any of these symptoms? (Select all that apply)  No numbness or tingling in feet.  No redness, sores or blisters on feet.  No complaints of excessive thirst.  No reports of blurry vision.  No significant changes to weight.    Have you had a diabetic eye exam in the last 12 months? No On her to do list         BP Readings from Last 2 Encounters:   02/27/23 121/72   01/30/23 113/69     Hemoglobin A1C (%)   Date Value   02/27/2023 7.4 (H)   10/12/2022 6.8 (H)   07/01/2021 8.5 (H)   04/28/2021 8.9 (H)     LDL Cholesterol Calculated (mg/dL)   Date Value   10/12/2022 49   01/06/2022 53   07/01/2021 56   06/29/2020 37     Left shoulder has been painful for several years but continues to get worse, denies any known trauma, becoming harder to sleep, does not improve with use, no numbness or tingling, no swelling/redness.     Review of Systems   Constitutional, HEENT, cardiovascular, pulmonary, gi and gu systems are negative, except as otherwise noted.      Objective    /72 (BP Location: Right arm, Patient Position: Sitting, Cuff Size: Adult Large)   Pulse 74   Temp 97  F (36.1  C) (Oral)   Resp 16   LMP 10/03/2010   SpO2 100%   There is no height or weight on file to calculate BMI.  Physical Exam   GENERAL: healthy, alert and no distress  RESP: lungs clear to auscultation - no rales, rhonchi or wheezes  CV: regular rate and rhythm, normal S1 S2, no S3 or S4, no murmur, click or rub, no peripheral edema and peripheral pulses strong  MS: normal muscle tone, decreased range of motion of left shoulder and no edema, muscle strength 5/5 b/l

## 2023-03-22 ENCOUNTER — OFFICE VISIT (OUTPATIENT)
Dept: ORTHOPEDICS | Facility: CLINIC | Age: 63
End: 2023-03-22
Attending: NURSE PRACTITIONER
Payer: COMMERCIAL

## 2023-03-22 ENCOUNTER — ANCILLARY PROCEDURE (OUTPATIENT)
Dept: GENERAL RADIOLOGY | Facility: CLINIC | Age: 63
End: 2023-03-22
Attending: STUDENT IN AN ORGANIZED HEALTH CARE EDUCATION/TRAINING PROGRAM
Payer: COMMERCIAL

## 2023-03-22 VITALS — WEIGHT: 224 LBS | BODY MASS INDEX: 37.32 KG/M2 | HEIGHT: 65 IN

## 2023-03-22 DIAGNOSIS — M25.512 CHRONIC LEFT SHOULDER PAIN: ICD-10-CM

## 2023-03-22 DIAGNOSIS — G89.29 CHRONIC LEFT SHOULDER PAIN: Primary | ICD-10-CM

## 2023-03-22 DIAGNOSIS — M75.42 ROTATOR CUFF IMPINGEMENT SYNDROME OF LEFT SHOULDER: ICD-10-CM

## 2023-03-22 DIAGNOSIS — G89.29 CHRONIC LEFT SHOULDER PAIN: ICD-10-CM

## 2023-03-22 DIAGNOSIS — M25.512 CHRONIC LEFT SHOULDER PAIN: Primary | ICD-10-CM

## 2023-03-22 PROCEDURE — 99204 OFFICE O/P NEW MOD 45 MIN: CPT | Performed by: STUDENT IN AN ORGANIZED HEALTH CARE EDUCATION/TRAINING PROGRAM

## 2023-03-22 PROCEDURE — 73030 X-RAY EXAM OF SHOULDER: CPT | Mod: TC | Performed by: RADIOLOGY

## 2023-03-22 NOTE — PATIENT INSTRUCTIONS
"1. Chronic left shoulder pain    2. Rotator cuff impingement syndrome of left shoulder      Susan Dietrich is a 62 year old female presenting for evaluation of chronic left shoulder pain. History, examination and X-ray imaging are consistent with rotator cuff impingement. This is in the setting of mild AC arthritis and type II acromion with small subacromial osteophyte (bone spur). Low suspicion for rotator cuff tear given intact strength. Reviewed treatment plan inclusive of pain management (topicals, NSAIDS, steroid injection), activity modification (avoiding painful activities), formal physical therapy and timing of advance imaging (ie MRI).      At this time, plan to proceed with the following:  - Referral placed for formal physical therapy. Please call [phone] to schedule. Exercises to include pain free range of motion of the shoulder, scapular stabilization, stretching of the shoulder capsule, strengthening of the periscapular, rotator cuff, and deltoid muscles, with progression to functional rehabilitation with home exercise prescription.  - Activity as tolerated based on pain. Avoid provoking activities but continue working on range of motion.   - Diclofenac 50 mg tabs prescribed. Take 1 tab with food every 12 hours (breakfast and dinner) for the next 7-14 days, then reduce to as-needed. This is a prescription-strength non-steroidal anti-inflammatory (NSAID) medication. Do not use any other NSAIDS (ie ibuprofen products) while taking this medication. Stop this medication if you have stomach upset.  - May try ice (10-15 minutes 3-4 times per day) or heat as needed for comfort.    Please schedule a follow up appointment to see me in 4-6 weeks, or sooner as needed for persistence or worsening of pain. Please schedule this as a 40 minute \"possible injection\" visit. If pain has not improved, we can go ahead with a cortisone injection at that time.     You may call our direct clinic number ([phone]) at " any time with questions or concerns.    Chelsie Breaux MD, CAQSM  [facility]         Spironolactone Counseling: Patient advised regarding risks of diarrhea, abdominal pain, hyperkalemia, birth defects (for female patients), liver toxicity and renal toxicity. The patient may need blood work to monitor liver and kidney function and potassium levels while on therapy. The patient verbalized understanding of the proper use and possible adverse effects of spironolactone.  All of the patient's questions and concerns were addressed.

## 2023-03-22 NOTE — PROGRESS NOTES
"ASSESSMENT & PLAN    1. Chronic left shoulder pain    2. Rotator cuff impingement syndrome of left shoulder      Susan Dietrich is a 62 year old female presenting for evaluation of chronic left shoulder pain. History, examination and X-ray imaging are consistent with rotator cuff impingement. This is in the setting of mild AC arthritis with with a small subacromial osteophyte and type II acromion. Low suspicion for rotator cuff tear given intact strength. Reviewed treatment plan inclusive of pain management (topicals, NSAIDS, steroid injection), activity modification (avoiding painful activities), formal physical therapy and timing of advance imaging (ie MRI).      At this time, plan to proceed with the following:  - Referral placed for formal physical therapy. Please call [phone] to schedule. Exercises to include pain free range of motion of the shoulder, scapular stabilization, stretching of the shoulder capsule, strengthening of the periscapular, rotator cuff, and deltoid muscles, with progression to functional rehabilitation with home exercise prescription.  - Activity as tolerated based on pain. Avoid provoking activities but continue working on range of motion.   - Diclofenac 50 mg tabs prescribed. Take 1 tab with food every 12 hours (breakfast and dinner) for the next 7-14 days, then reduce to as-needed. This is a prescription-strength non-steroidal anti-inflammatory (NSAID) medication. Do not use any other NSAIDS (ie ibuprofen products) while taking this medication. Stop this medication if you have stomach upset.  - May try ice (10-15 minutes 3-4 times per day) or heat as needed for comfort.    Please schedule a follow up appointment to see me in 4-6 weeks, or sooner as needed for persistence or worsening of pain. Please schedule this as a 40 minute \"possible injection\" visit. If pain has not improved, we can go ahead with a cortisone injection at that time.     You may call our direct clinic number " "([phone]) at any time with questions or concerns.    Chelsie Breaux MD, [facility]    -----    SUBJECTIVE  Susan Dietrich is a/an 62 year old Right handed female who is seen in consultation at the request of  Shonna Salgado C.N.P. for evaluation of left shoulder pain. The patient is seen by themselves.    Onset: \"long time\" with pain progressively getting worse over the last 1 year. Reports insidious onset without acute precipitating event.  Location of Pain: left lateral shoulder, left upper arm, left scapula  Rating of Pain at worst: 9/10  Rating of Pain Currently: 0/10 at rest  Worsened by: reaching overhead, reaching behind back, quick movements, lifting, intermittently breathing is painful at rest, difficulty sleeping  Better with: rest / activity avoidance, heat, medication  Treatments tried: rest/activity avoidance, heat, ibuprofen and Aleve, salon pas patch (unsure if it was helpful)  Associated symptoms: patient states she woke up this morning her left arm was numb, otherwise she denies numbness / tingling  Orthopedic history: NO  Relevant surgical history: NO  Social history: social history: works - office    Past Medical History:   Diagnosis Date     Arthritis      Congestive heart failure, unspecified     Mother     Depressive disorder      Essential hypertension, benign 10/24/2006     Hypermobility syndrome     multiple joint pains     Irritable bowel syndrome     referred to GI 9/05;  nl colonoscopy 12/05     Migraine, unspecified, without mention of intractable migraine without mention of status migrainosus     Migraine     NONSPECIFIC MEDICAL HISTORY 12/05    nl colonoscopy      Other and unspecified malignant neoplasm of skin of other and unspecified parts of face 4/08    squamous cell skin CA, face     PVD (peripheral vascular disease) (H)      Type II or unspecified type diabetes mellitus without mention of complication, not stated as " "uncontrolled      Social History     Socioeconomic History     Marital status:      Spouse name: Landry     Number of children: 0   Occupational History     Occupation:      Comment: [employer]     Employer: [employer]   Tobacco Use     Smoking status: Former     Packs/day: 1.00     Years: 5.00     Pack years: 5.00     Types: Cigarettes     Quit date: 1985     Years since quittin.2     Smokeless tobacco: Never     Tobacco comments:     quit in  smoked one pack a day   Vaping Use     Vaping Use: Never used   Substance and Sexual Activity     Alcohol use: Yes     Comment: Minimal     Drug use: No     Sexual activity: Yes     Partners: Male     Birth control/protection: Surgical     Comment: No sex drive   Other Topics Concern      Service No     Blood Transfusions No     Caffeine Concern No     Comment: quit     Stress Concern Yes     Comment: increased work stress     Special Diet No     Comment: calcium daily, dairy per day 4     Exercise Yes     Comment: 4 per week, cardio and weights     Seat Belt Yes     Comment: 98%     Self-Exams No     Parent/sibling w/ CABG, MI or angioplasty before 65F 55M? Yes         Patient's past medical, surgical, social, and family histories were reviewed today and no changes are noted.    REVIEW OF SYSTEMS:  10 point ROS is negative other than symptoms noted above in HPI, Past Medical History or as stated below  Constitutional: NEGATIVE for fever, chills, change in weight  Skin: NEGATIVE for worrisome rashes, moles or lesions  GI/: NEGATIVE for bowel or bladder changes  Neuro: NEGATIVE for weakness, dizziness or paresthesias    OBJECTIVE:  Ht 1.651 m (5' 5\")   Wt 101.6 kg (224 lb)   LMP 10/03/2010   BMI 37.28 kg/m     General: healthy, alert and in no distress  HEENT: no scleral icterus or conjunctival erythema  Skin: no suspicious lesions or rash. No jaundice.  CV: regular rhythm by palpation  Resp: normal respiratory effort " without conversational dyspnea   Psych: normal mood and affect  Gait: normal steady gait with appropriate coordination and balance  Neuro: normal light touch sensory exam of the bilateral upper extremities.    MSK:  LEFT SHOULDER  Inspection:    No swelling, bruising, discoloration, or obvious deformity or asymmetry  Palpation:    Mildly tender about the greater tuberosity and posterior capsule. Remainder of bony and tendinous landmarks are nontender.  Active/Passive Range of Motion:     Abduction 135/1650 limited by stiffness, FF 1750, , IR T12.      Scapular dyskinesis absent  Strength:    Scapular plane abduction 5-/5,  ER 5-/5, IR 5/5, biceps 5/5, triceps 5/5  Special Tests:    Positive: Neer's, Priest' and drop arm/painful arc    Negative: supraspinatus (empty can), lift-off and Speed's    Independent visualization of the below image:    XR LEFT SHOULDER 3/22/23  Per my review, mild-moderate osteoarthritis of the AC joint with a subacromial osteophyte mildly impinging on the subacromial space. Type II acromion. Minimal degenerative changes of the glenohumeral joint. No fracture noted. Awaiting radiology review.    MRI LEFT SHOULDER WO CONTRAST 6/24/2011  IMPRESSION  1. Mild Type II subacromial morphology  2. Mild AC degenerative changes mildly narrow the supraspinatus outlet  3. No evidence of rotator cuff tear or significant tendinosis      Chelsie Breaux MD, CAM  [facility]

## 2023-03-22 NOTE — LETTER
3/22/2023         RE: Susan Dietrich  [address] [place], [place] [id number]        Dear Colleague,    Thank you for referring your patient, Susan Dietrich, to the [facility] [place]. Please see a copy of my visit note below.    ASSESSMENT & PLAN    1. Chronic left shoulder pain    2. Rotator cuff impingement syndrome of left shoulder      Susan Dietrich is a 62 year old female presenting for evaluation of chronic left shoulder pain. History, examination and X-ray imaging are consistent with rotator cuff impingement. This is in the setting of mild AC arthritis with with a small subacromial osteophyte and type II acromion. Low suspicion for rotator cuff tear given intact strength. Reviewed treatment plan inclusive of pain management (topicals, NSAIDS, steroid injection), activity modification (avoiding painful activities), formal physical therapy and timing of advance imaging (ie MRI).      At this time, plan to proceed with the following:  - Referral placed for formal physical therapy. Please call [phone] to schedule. Exercises to include pain free range of motion of the shoulder, scapular stabilization, stretching of the shoulder capsule, strengthening of the periscapular, rotator cuff, and deltoid muscles, with progression to functional rehabilitation with home exercise prescription.  - Activity as tolerated based on pain. Avoid provoking activities but continue working on range of motion.   - Diclofenac 50 mg tabs prescribed. Take 1 tab with food every 12 hours (breakfast and dinner) for the next 7-14 days, then reduce to as-needed. This is a prescription-strength non-steroidal anti-inflammatory (NSAID) medication. Do not use any other NSAIDS (ie ibuprofen products) while taking this medication. Stop this medication if you have stomach upset.  - May try ice (10-15 minutes 3-4 times per day) or heat as needed for comfort.    Please schedule a follow up appointment to  "see me in 4-6 weeks, or sooner as needed for persistence or worsening of pain. Please schedule this as a 40 minute \"possible injection\" visit. If pain has not improved, we can go ahead with a cortisone injection at that time.     You may call our direct clinic number ([phone]) at any time with questions or concerns.    Chelsie Breaux MD, [facility]    -----    SUBJECTIVE  Susan Dietrich is a/an 62 year old Right handed female who is seen in consultation at the request of  Shonna Salgado C.N.P. for evaluation of left shoulder pain. The patient is seen by themselves.    Onset: \"long time\" with pain progressively getting worse over the last 1 year. Reports insidious onset without acute precipitating event.  Location of Pain: left lateral shoulder, left upper arm, left scapula  Rating of Pain at worst: 9/10  Rating of Pain Currently: 0/10 at rest  Worsened by: reaching overhead, reaching behind back, quick movements, lifting, intermittently breathing is painful at rest, difficulty sleeping  Better with: rest / activity avoidance, heat, medication  Treatments tried: rest/activity avoidance, heat, ibuprofen and Aleve, salon pas patch (unsure if it was helpful)  Associated symptoms: patient states she woke up this morning her left arm was numb, otherwise she denies numbness / tingling  Orthopedic history: NO  Relevant surgical history: NO  Social history: social history: works - office    Past Medical History:   Diagnosis Date     Arthritis      Congestive heart failure, unspecified     Mother     Depressive disorder      Essential hypertension, benign 10/24/2006     Hypermobility syndrome     multiple joint pains     Irritable bowel syndrome     referred to GI 9/05;  nl colonoscopy 12/05     Migraine, unspecified, without mention of intractable migraine without mention of status migrainosus     Migraine     NONSPECIFIC MEDICAL HISTORY 12/05    nl colonoscopy      " "Other and unspecified malignant neoplasm of skin of other and unspecified parts of face     squamous cell skin CA, face     PVD (peripheral vascular disease) (H)      Type II or unspecified type diabetes mellitus without mention of complication, not stated as uncontrolled      Social History     Socioeconomic History     Marital status:      Spouse name: Landry     Number of children: 0   Occupational History     Occupation:      Comment: [employer]     Employer: [employer]   Tobacco Use     Smoking status: Former     Packs/day: 1.00     Years: 5.00     Pack years: 5.00     Types: Cigarettes     Quit date: 1985     Years since quittin.2     Smokeless tobacco: Never     Tobacco comments:     quit in  smoked one pack a day   Vaping Use     Vaping Use: Never used   Substance and Sexual Activity     Alcohol use: Yes     Comment: Minimal     Drug use: No     Sexual activity: Yes     Partners: Male     Birth control/protection: Surgical     Comment: No sex drive   Other Topics Concern      Service No     Blood Transfusions No     Caffeine Concern No     Comment: quit     Stress Concern Yes     Comment: increased work stress     Special Diet No     Comment: calcium daily, dairy per day 4     Exercise Yes     Comment: 4 per week, cardio and weights     Seat Belt Yes     Comment: 98%     Self-Exams No     Parent/sibling w/ CABG, MI or angioplasty before 65F 55M? Yes         Patient's past medical, surgical, social, and family histories were reviewed today and no changes are noted.    REVIEW OF SYSTEMS:  10 point ROS is negative other than symptoms noted above in HPI, Past Medical History or as stated below  Constitutional: NEGATIVE for fever, chills, change in weight  Skin: NEGATIVE for worrisome rashes, moles or lesions  GI/: NEGATIVE for bowel or bladder changes  Neuro: NEGATIVE for weakness, dizziness or paresthesias    OBJECTIVE:  Ht 1.651 m (5' 5\")   Wt 101.6 " kg (224 lb)   LMP 10/03/2010   BMI 37.28 kg/m     General: healthy, alert and in no distress  HEENT: no scleral icterus or conjunctival erythema  Skin: no suspicious lesions or rash. No jaundice.  CV: regular rhythm by palpation  Resp: normal respiratory effort without conversational dyspnea   Psych: normal mood and affect  Gait: normal steady gait with appropriate coordination and balance  Neuro: normal light touch sensory exam of the bilateral upper extremities.    MSK:  LEFT SHOULDER  Inspection:    No swelling, bruising, discoloration, or obvious deformity or asymmetry  Palpation:    Mildly tender about the greater tuberosity and posterior capsule. Remainder of bony and tendinous landmarks are nontender.  Active/Passive Range of Motion:     Abduction 135/1650 limited by stiffness, FF 1750, , IR T12.      Scapular dyskinesis absent  Strength:    Scapular plane abduction 5-/5,  ER 5-/5, IR 5/5, biceps 5/5, triceps 5/5  Special Tests:    Positive: Neer's, Priest' and drop arm/painful arc    Negative: supraspinatus (empty can), lift-off and Speed's    Independent visualization of the below image:    XR LEFT SHOULDER 3/22/23  Per my review, mild-moderate osteoarthritis of the AC joint with a subacromial osteophyte mildly impinging on the subacromial space. Type II acromion. Minimal degenerative changes of the glenohumeral joint. No fracture noted. Awaiting radiology review.    MRI LEFT SHOULDER WO CONTRAST 6/24/2011  IMPRESSION  1. Mild Type II subacromial morphology  2. Mild AC degenerative changes mildly narrow the supraspinatus outlet  3. No evidence of rotator cuff tear or significant tendinosis      Chelsie Breaux MD, [facility]        Again, thank you for allowing me to participate in the care of your patient.        Sincerely,        Chelsie Breaux MD

## 2023-04-03 ENCOUNTER — THERAPY VISIT (OUTPATIENT)
Dept: PHYSICAL THERAPY | Facility: CLINIC | Age: 63
End: 2023-04-03
Attending: STUDENT IN AN ORGANIZED HEALTH CARE EDUCATION/TRAINING PROGRAM
Payer: COMMERCIAL

## 2023-04-03 DIAGNOSIS — M25.512 CHRONIC LEFT SHOULDER PAIN: ICD-10-CM

## 2023-04-03 DIAGNOSIS — M75.42 ROTATOR CUFF IMPINGEMENT SYNDROME OF LEFT SHOULDER: ICD-10-CM

## 2023-04-03 DIAGNOSIS — G89.29 CHRONIC LEFT SHOULDER PAIN: ICD-10-CM

## 2023-04-03 PROCEDURE — 97161 PT EVAL LOW COMPLEX 20 MIN: CPT | Mod: GP

## 2023-04-03 PROCEDURE — 97110 THERAPEUTIC EXERCISES: CPT | Mod: GP

## 2023-04-03 NOTE — PROGRESS NOTES
Physical Therapy Initial Evaluation  Therapist Impression: Susan is a 62 year old year old female referred to physical therapy by Dr. Fuentes for treatment of L shoulder pain. Subjective history and objective findings are consistent with shoulder impingement/RC tendinopathy. Due to these impairments, patient has difficulty with reaching, lifting, bathing, and dressing. Patient will benefit from skilled PT to address impairments/limitations in order to reach patient's goals, facilitate return to prior level of function, and maximize participation.    KEY FINDINGS:  1. Limited and painful L shoulder AROM  2. Primary loss of strength noted in ABD, painful    Subjective:  The history is provided by the patient. No  was used.   Patient Health History  Susan Dietrich being seen for left shoulder/arm.       Problem occurred: no clue   Pain is reported as 7/10 on pain scale.  General health as reported by patient is good.              Current medications:  Pain medication and other. Other medications details: diabetes.    Current occupation is admin.   Primary job tasks include:  Computer work, prolonged sitting and repetitive tasks.                  Therapist Generated HPI Evaluation  Problem details: The patient presents to therapy with complaints of chronic left shoulder pain. Pain has been going on for 1 year. The pain is located on the posterior aspect of the shoulder blade, superior aspect of the shoulder, and radiates down the anterior arm into the hand. Pain is exasperated by putting bra on, washing hair, reaching, computer work, and sitting. She is unable to get a full night sleep due to pain waking her up. Pain improves when in a hot shower. The patient works as .         Type of problem:  Left shoulder.    This is a chronic condition.  Condition occurred with:  Unknown cause.    Patient reports pain:  Posterior, scapular area and anterior.  Pain is described as aching and is  constant.  Pain radiates to:  Elbow, lower arm and hand. Pain is the same all the time.    Associated symptoms:  Painful arc. Symptoms are exacerbated by carrying, lifting, using arm at shoulder level, using arm overhead, using arm behind back, lying on extremity and certain positions    Special tests included:  X-ray.    Restrictions due to condition include:  Working in normal job without restrictions.  Barriers include:  None as reported by patient.                        Objective:  Standing Alignment:      Shoulder/UE:  Rounded shoulders                                       Shoulder Evaluation:  ROM:  AROM:    Flexion:  Left:  150, increse in L shoulder pain at end range    Right:  175    Abduction:  Left: 95, moderate increase in L shoulder pain   Right:  180    Internal Rotation:  Left:  Belt line    Right:  Inferior angle of contralateral spine  External Rotation:  Left:  75, increased pain in L shoulder    Right:  85                      Strength:    Flexion: Left:4-/5    Pain: +    Right: 5-/5      Pain:  -    Abduction:  Left: 4-/5   Pain:++    Right: 5-/5      Pain:-    Internal Rotation:  Left:5-/5      Pain:+    Right: 5-/5      Pain:-  External Rotation:   Left:4+/5      Pain:+   Right:5-/5      Pain:-                Palpation:  normal (Not tender to light palpation)                                         General     ROS    Assessment/Plan:    Patient is a 62 year old female with left side shoulder complaints.    Patient has the following significant findings with corresponding treatment plan.                Diagnosis 1:  Chronic L shoulder pain  Pain -  self management, education, directional preference exercise and home program  Decreased ROM/flexibility - manual therapy, therapeutic exercise, therapeutic activity and home program  Impaired muscle performance - neuro re-education and home program    Therapy Evaluation Codes:   1) History comprised of:   Personal factors that impact the plan of  care:      Time since onset of symptoms.    Comorbidity factors that impact the plan of care are:      None.     Medications impacting care: None.  2) Examination of Body Systems comprised of:   Body structures and functions that impact the plan of care:      Shoulder.   Activity limitations that impact the plan of care are:      Bathing, Dressing, Lifting, Sitting, Working and Sleeping.  3) Clinical presentation characteristics are:   Stable/Uncomplicated.  4) Decision-Making    Low complexity using standardized patient assessment instrument and/or measureable assessment of functional outcome.  Cumulative Therapy Evaluation is: Low complexity.    Previous and current functional limitations:  (See Goal Flow Sheet for this information)    Short term and Long term goals: (See Goal Flow Sheet for this information)     Communication ability:  Patient appears to be able to clearly communicate and understand verbal and written communication and follow directions correctly.  Treatment Explanation - The following has been discussed with the patient:   RX ordered/plan of care  Anticipated outcomes  Possible risks and side effects  This patient would benefit from PT intervention to resume normal activities.   Rehab potential is good.    Frequency:  1 X week, once daily  Duration:  for 6 weeks  Discharge Plan:  Achieve all LTG.  Independent in home treatment program.  Reach maximal therapeutic benefit.    Please refer to the daily flowsheet for treatment today, total treatment time and time spent performing 1:1 timed codes.

## 2023-04-10 ENCOUNTER — THERAPY VISIT (OUTPATIENT)
Dept: PHYSICAL THERAPY | Facility: CLINIC | Age: 63
End: 2023-04-10
Attending: STUDENT IN AN ORGANIZED HEALTH CARE EDUCATION/TRAINING PROGRAM
Payer: COMMERCIAL

## 2023-04-10 DIAGNOSIS — M25.512 CHRONIC LEFT SHOULDER PAIN: Primary | ICD-10-CM

## 2023-04-10 DIAGNOSIS — G89.29 CHRONIC LEFT SHOULDER PAIN: Primary | ICD-10-CM

## 2023-04-10 PROCEDURE — 97112 NEUROMUSCULAR REEDUCATION: CPT | Mod: GP | Performed by: PHYSICAL THERAPIST

## 2023-04-10 PROCEDURE — 97110 THERAPEUTIC EXERCISES: CPT | Mod: GP | Performed by: PHYSICAL THERAPIST

## 2023-04-17 ENCOUNTER — THERAPY VISIT (OUTPATIENT)
Dept: PHYSICAL THERAPY | Facility: CLINIC | Age: 63
End: 2023-04-17
Attending: STUDENT IN AN ORGANIZED HEALTH CARE EDUCATION/TRAINING PROGRAM
Payer: COMMERCIAL

## 2023-04-17 DIAGNOSIS — G89.29 CHRONIC LEFT SHOULDER PAIN: Primary | ICD-10-CM

## 2023-04-17 DIAGNOSIS — M25.512 CHRONIC LEFT SHOULDER PAIN: Primary | ICD-10-CM

## 2023-04-17 PROCEDURE — 97110 THERAPEUTIC EXERCISES: CPT | Mod: GP | Performed by: PHYSICAL THERAPIST

## 2023-04-17 PROCEDURE — 97112 NEUROMUSCULAR REEDUCATION: CPT | Mod: GP | Performed by: PHYSICAL THERAPIST

## 2023-04-24 ENCOUNTER — THERAPY VISIT (OUTPATIENT)
Dept: PHYSICAL THERAPY | Facility: CLINIC | Age: 63
End: 2023-04-24
Payer: COMMERCIAL

## 2023-04-24 DIAGNOSIS — M25.512 CHRONIC LEFT SHOULDER PAIN: Primary | ICD-10-CM

## 2023-04-24 DIAGNOSIS — G89.29 CHRONIC LEFT SHOULDER PAIN: Primary | ICD-10-CM

## 2023-04-24 PROCEDURE — 97110 THERAPEUTIC EXERCISES: CPT | Mod: GP | Performed by: PHYSICAL THERAPIST

## 2023-04-25 ENCOUNTER — TRANSFERRED RECORDS (OUTPATIENT)
Dept: HEALTH INFORMATION MANAGEMENT | Facility: CLINIC | Age: 63
End: 2023-04-25
Payer: COMMERCIAL

## 2023-04-25 LAB — RETINOPATHY: NEGATIVE

## 2023-04-26 ENCOUNTER — OFFICE VISIT (OUTPATIENT)
Dept: ORTHOPEDICS | Facility: CLINIC | Age: 63
End: 2023-04-26
Payer: COMMERCIAL

## 2023-04-26 VITALS — HEIGHT: 65 IN | BODY MASS INDEX: 37.32 KG/M2 | WEIGHT: 224 LBS

## 2023-04-26 DIAGNOSIS — M75.42 ROTATOR CUFF IMPINGEMENT SYNDROME OF LEFT SHOULDER: ICD-10-CM

## 2023-04-26 DIAGNOSIS — M75.52 SUBACROMIAL BURSITIS OF LEFT SHOULDER JOINT: ICD-10-CM

## 2023-04-26 DIAGNOSIS — M25.512 CHRONIC LEFT SHOULDER PAIN: Primary | ICD-10-CM

## 2023-04-26 DIAGNOSIS — G89.29 CHRONIC LEFT SHOULDER PAIN: Primary | ICD-10-CM

## 2023-04-26 PROCEDURE — 20610 DRAIN/INJ JOINT/BURSA W/O US: CPT | Mod: LT | Performed by: STUDENT IN AN ORGANIZED HEALTH CARE EDUCATION/TRAINING PROGRAM

## 2023-04-26 RX ORDER — TRIAMCINOLONE ACETONIDE 40 MG/ML
40 INJECTION, SUSPENSION INTRA-ARTICULAR; INTRAMUSCULAR
Status: SHIPPED | OUTPATIENT
Start: 2023-04-26

## 2023-04-26 RX ORDER — LIDOCAINE HYDROCHLORIDE 10 MG/ML
4 INJECTION, SOLUTION INFILTRATION; PERINEURAL
Status: SHIPPED | OUTPATIENT
Start: 2023-04-26

## 2023-04-26 RX ADMIN — TRIAMCINOLONE ACETONIDE 40 MG: 40 INJECTION, SUSPENSION INTRA-ARTICULAR; INTRAMUSCULAR at 08:22

## 2023-04-26 RX ADMIN — LIDOCAINE HYDROCHLORIDE 4 ML: 10 INJECTION, SOLUTION INFILTRATION; PERINEURAL at 08:22

## 2023-04-26 NOTE — PROGRESS NOTES
ASSESSMENT & PLAN    1. Chronic left shoulder pain    2. Subacromial bursitis of left shoulder joint    3. Rotator cuff impingement syndrome of left shoulder      - Presenting for follow up of left shoulder pain due to rotator cuff tendinosis and impingement with overlying AC joint arthritis. Evaluation today is suspicious for possible developing frozen shoulder (adhesive capsulitis) as well. Symptoms have persisted despite formal physical therapy and anti-inflammatories thusfar. Upon discussion, patient elects to proceed with the following:    - A left subacromial cortisone injection was performed in clinic today without complication and with immediate pain relief noted. Post-injection instructions provided below.  - Take it easy for the next 2 weeks while the cortisone takes effect, then gradually increase activity as tolerated based on pain.  - Continue gentle movement of the shoulder to avoid stiffness.  - After 2 weeks, resume your home exercise program as prescribed by physical therapy. Gradually increase your activities as tolerated based on pain and continue to work on shoulder mobility. Return to in-person physical therapy as needed.    Return to clinic as needed for persistence or worsening of pain. This injection can be repeated after 4 months if needed.     You may call our direct clinic number ([phone]) at any time with questions or concerns.    hCelsie Breaux MD, [facility]    -----    SUBJECTIVE:  Susan Dietrich is a 62 year old female who is seen in follow-up for left shoulder pain. They were last seen 3/22/2023.     Since their last visit reports significant improvement in ROM and pain in left shoulder. however she reports no improvement in pain in left upper arm and forearm. She states pain has also worsened since her PT appointment on Monday, 4/24/23. She denies numbness and tingling in the left arm. She reports intermittent pain even at  "rest. Patient is unable to quantify pain level but notes that it is quite severe with shoulder motion. They have tried rest/activity avoidance, other medications: Diclofenac 50mg - provides relief, home exercises, physical therapy (4 visits), previous imaging (xray 3/22/23) and ibuprofen - intermittent relief.      The patient is seen by themselves.    Patient's past medical, surgical, social, and family histories were reviewed today and no changes are noted.    REVIEW OF SYSTEMS:  Constitutional: NEGATIVE for fever, chills, change in weight  Skin: NEGATIVE for worrisome rashes, moles or lesions  GI/: NEGATIVE for bowel or bladder changes  Neuro: NEGATIVE for weakness, dizziness or paresthesias    OBJECTIVE:  Ht 1.651 m (5' 5\")   Wt 101.6 kg (224 lb)   LMP 10/03/2010   BMI 37.28 kg/m     General: healthy, alert and in no distress  HEENT: no scleral icterus or conjunctival erythema  Skin: no suspicious lesions or rash. No jaundice.  CV: regular rhythm by palpation, no pedal edema  Resp: normal respiratory effort without conversational dyspnea   Psych: normal mood and affect  Gait: normal steady gait with appropriate coordination and balance  Neuro: normal light touch sensory exam of the extremities.    MSK:  LEFT SHOULDER  Inspection:    No swelling, bruising, discoloration, or obvious deformity or asymmetry  Palpation:    Mildly tender about the greater tuberosity and posterior capsule. Remainder of bony and tendinous landmarks are nontender.  Active/Passive Range of Motion:     Abduction 135/1450 limited by stiffness, FF 1750, ER 45/450, IR L2.      Scapular dyskinesis absent  Strength:    Scapular plane abduction 5-/5 +pain,  ER 5-/5 +pain, IR 5-/5 +pain, biceps 5/5, triceps 5/5  Special Tests:    Positive: Neer's, Priest' and drop arm/painful arc    Negative: supraspinatus (empty can), lift-off and Speed's, Spurlings    LEFT ELBOW  Inspection:    No swelling, bruising, discoloration, or obvious deformity or " asymmetry  Palpation:    Bony, ligamentous and tendinous landmarks are nontender.    Crepitus is Absent  Range of Motion:     Elbow: Extension full / flexion full / pronation full / supination full    Wrist: Full (active and passive) flexion, extension, pronation/supination, and ulnar/radial deviation.  Strength:    No deficits in elbow flexion, extension, pronation, or supination.    No deficits in wrist flexion, extension, ulnar/radial deviation.  Special Tests:    Positive: none    Negative: Pain with resisted wrist extension, pain with resisted middle finger extension, pain with resisted wrist flexion, pain with resisted supination, pain with resisted pronation    Independent visualization of the below image:    Results for orders placed or performed in visit on 03/22/23   XR Shoulder Left G/E 3 Views        XR SHOULDER LEFT G/E 3 VIEWS   3/22/2023 8:44 AM     HISTORY: Chronic left shoulder pain; Chronic left shoulder pain  COMPARISON: None.         IMPRESSION: Normal alignment. No fracture. Minimal degenerative  arthrosis of the AC joint. There may be a tiny calcification  projecting near the lesser tuberosity, which could represent minimal  hydroxyapatite deposition.    KIM REID MD      Large Joint Injection/Arthocentesis: L subacromial bursa    Date/Time: 4/26/2023 8:22 AM    Performed by: Chelsie Wilson MD  Authorized by: Chelsie Wilson MD    Indications:  Pain  Needle Size:  25 G  Guidance: landmark guided    Approach:  Posterior  Location:  Shoulder      Site:  L subacromial bursa  Medications:  40 mg triamcinolone 40 MG/ML; 4 mL lidocaine 1 %  Outcome:  Tolerated well, no immediate complications  Procedure discussed: discussed risks, benefits, and alternatives    Consent Given by:  Patient  Timeout: timeout called immediately prior to procedure    Prep: patient was prepped and draped in usual sterile fashion      Patient rated her pain as moderate pre-injection and 0/10  post-injection.    Chelsie Breaux MD, CAQSM  [facility]

## 2023-04-26 NOTE — LETTER
4/26/2023         RE: Susan Dietrich  [id number] [place] [address]        Dear Colleague,    Thank you for referring your patient, Susan Dietrich, to the [facility] [place]. Please see a copy of my visit note below.    ASSESSMENT & PLAN    1. Chronic left shoulder pain    2. Subacromial bursitis of left shoulder joint    3. Rotator cuff impingement syndrome of left shoulder      - Presenting for follow up of left shoulder pain due to rotator cuff tendinosis and impingement with overlying AC joint arthritis. Evaluation today is suspicious for possible developing frozen shoulder (adhesive capsulitis) as well. Symptoms have persisted despite formal physical therapy and anti-inflammatories thusfar. Upon discussion, patient elects to proceed with the following:    - A left subacromial cortisone injection was performed in clinic today without complication and with immediate pain relief noted. Post-injection instructions provided below.  - Take it easy for the next 2 weeks while the cortisone takes effect, then gradually increase activity as tolerated based on pain.  - Continue gentle movement of the shoulder to avoid stiffness.  - After 2 weeks, resume your home exercise program as prescribed by physical therapy. Gradually increase your activities as tolerated based on pain and continue to work on shoulder mobility. Return to in-person physical therapy as needed.    Return to clinic as needed for persistence or worsening of pain. This injection can be repeated after 4 months if needed.     You may call our direct clinic number ([phone]) at any time with questions or concerns.    Chelsie Breaux MD, [facility]    -----    SUBJECTIVE:  Susan Dietrich is a 62 year old female who is seen in follow-up for left shoulder pain. They were last seen 3/22/2023.     Since their last visit reports significant improvement in ROM and  "pain in left shoulder. however she reports no improvement in pain in left upper arm and forearm. She states pain has also worsened since her PT appointment on Monday, 4/24/23. She denies numbness and tingling in the left arm. She reports intermittent pain even at rest. Patient is unable to quantify pain level but notes that it is quite severe with shoulder motion. They have tried rest/activity avoidance, other medications: Diclofenac 50mg - provides relief, home exercises, physical therapy (4 visits), previous imaging (xray 3/22/23) and ibuprofen - intermittent relief.      The patient is seen by themselves.    Patient's past medical, surgical, social, and family histories were reviewed today and no changes are noted.    REVIEW OF SYSTEMS:  Constitutional: NEGATIVE for fever, chills, change in weight  Skin: NEGATIVE for worrisome rashes, moles or lesions  GI/: NEGATIVE for bowel or bladder changes  Neuro: NEGATIVE for weakness, dizziness or paresthesias    OBJECTIVE:  Ht 1.651 m (5' 5\")   Wt 101.6 kg (224 lb)   LMP 10/03/2010   BMI 37.28 kg/m     General: healthy, alert and in no distress  HEENT: no scleral icterus or conjunctival erythema  Skin: no suspicious lesions or rash. No jaundice.  CV: regular rhythm by palpation, no pedal edema  Resp: normal respiratory effort without conversational dyspnea   Psych: normal mood and affect  Gait: normal steady gait with appropriate coordination and balance  Neuro: normal light touch sensory exam of the extremities.    MSK:  LEFT SHOULDER  Inspection:    No swelling, bruising, discoloration, or obvious deformity or asymmetry  Palpation:    Mildly tender about the greater tuberosity and posterior capsule. Remainder of bony and tendinous landmarks are nontender.  Active/Passive Range of Motion:     Abduction 135/1450 limited by stiffness, FF 1750, ER 45/450, IR L2.      Scapular dyskinesis absent  Strength:    Scapular plane abduction 5-/5 +pain,  ER 5-/5 +pain, IR 5-/5 " +pain, biceps 5/5, triceps 5/5  Special Tests:    Positive: Neer's, Priest' and drop arm/painful arc    Negative: supraspinatus (empty can), lift-off and Speed's, Spurlings    LEFT ELBOW  Inspection:    No swelling, bruising, discoloration, or obvious deformity or asymmetry  Palpation:    Bony, ligamentous and tendinous landmarks are nontender.    Crepitus is Absent  Range of Motion:     Elbow: Extension full / flexion full / pronation full / supination full    Wrist: Full (active and passive) flexion, extension, pronation/supination, and ulnar/radial deviation.  Strength:    No deficits in elbow flexion, extension, pronation, or supination.    No deficits in wrist flexion, extension, ulnar/radial deviation.  Special Tests:    Positive: none    Negative: Pain with resisted wrist extension, pain with resisted middle finger extension, pain with resisted wrist flexion, pain with resisted supination, pain with resisted pronation    Independent visualization of the below image:    Results for orders placed or performed in visit on 03/22/23   XR Shoulder Left G/E 3 Views        XR SHOULDER LEFT G/E 3 VIEWS   3/22/2023 8:44 AM     HISTORY: Chronic left shoulder pain; Chronic left shoulder pain  COMPARISON: None.         IMPRESSION: Normal alignment. No fracture. Minimal degenerative  arthrosis of the AC joint. There may be a tiny calcification  projecting near the lesser tuberosity, which could represent minimal  hydroxyapatite deposition.    KIM REID MD      Large Joint Injection/Arthocentesis: L subacromial bursa    Date/Time: 4/26/2023 8:22 AM    Performed by: Chelsie Wilson MD  Authorized by: Chelsie Wilson MD    Indications:  Pain  Needle Size:  25 G  Guidance: landmark guided    Approach:  Posterior  Location:  Shoulder      Site:  L subacromial bursa  Medications:  40 mg triamcinolone 40 MG/ML; 4 mL lidocaine 1 %  Outcome:  Tolerated well, no immediate complications  Procedure  discussed: discussed risks, benefits, and alternatives    Consent Given by:  Patient  Timeout: timeout called immediately prior to procedure    Prep: patient was prepped and draped in usual sterile fashion      Patient rated her pain as moderate pre-injection and 0/10 post-injection.    Chelsie Breaux MD, [facility]            Again, thank you for allowing me to participate in the care of your patient.        Sincerely,        Chelsie Breaux MD

## 2023-04-26 NOTE — PATIENT INSTRUCTIONS
1. Chronic left shoulder pain    2. Subacromial bursitis of left shoulder joint    3. Rotator cuff impingement syndrome of left shoulder      - Presenting for follow up of left shoulder pain due to rotator cuff tendinosis and impingement with overlying AC joint arthritis. Evaluation today is suspicious for possible developing frozen shoulder (adhesive capsulitis) as well. Symptoms have persisted despite formal physical therapy and anti-inflammatories thusfar. Upon discussion, patient elects to proceed with the following:    - A left subacromial cortisone injection was performed in clinic today without complication and with immediate pain relief noted. Post-injection instructions provided below.  - Take it easy for the next 2 weeks while the cortisone takes effect, then gradually increase activity as tolerated based on pain.  - Continue gentle movement of the shoulder to avoid stiffness.  - After 2 weeks, resume your home exercise program as prescribed by physical therapy. Gradually increase your activities as tolerated based on pain and continue to work on shoulder mobility. Return to in-person physical therapy as needed.    Return to clinic as needed for persistence or worsening of pain. This injection can be repeated after 4 months if needed.     You may call our direct clinic number ([phone]) at any time with questions or concerns.    Chelsie Breaux MD, [facility]    JOINT INJECTION AFTERCARE    After any kind of joint injection, it is not uncommon to experience:  - Soreness, swelling or bruising around the injection site.  - Mild numbness, tingling or weakness around the injection site caused by the numbing medicine used before or with the injection.     It is also possible to experience the following effects associated with the specific agent after injection:  - Allergic reaction.  - Increased blood sugar levels for 10-14 days following cortisone injection.  If you have diabetes and notice that your blood sugar levels have increased, notify your primary care physician.  - Increased blood pressure levels.  - Mood swings.  - Increased fluid accumulation in the injected joint.    These effects all should resolve within a day or two of your procedure.    Please note that it may take up to 14 days for the steroid (cortisone) medication to start working.     HOME CARE INSTRUCTIONS    - Limit yourself to light activity activity on the day of your procedure. Avoid lifting heavy objects, bending, stooping or twisting.   - You may shower, but please avoid swimming, hot tubs or tub baths for 24 hours following the procedure.   - Take over-the-counter pain medication (NSAIDS or Tylenol) for pain control after your procedure as needed.    - You may use ice packs for 10-15 minutes 3-4 times per day at the injection site to reduce pain and swelling after your procedure.   + Put ice in a plastic bag  + Place a towel between your skin and the ice bag  + Leave the ice on for no longer than 20 minutes each time to avoid injuring your skin or nerves  + This can be repeated 3-4 times per day for a few days after the injection    SEEK IMMEDIATE MEDICAL CARE IF:    - Pain and swelling get worse rather than better or extend beyond the injection site  - Numbness does not go away after a few hours  - Blood or fluid continues to leak from the injection site  - You experience chest pain  - You have swelling of your face or tongue  - You have trouble breathing or become dizzy  - You develop fever, chills or severe tenderness at the injection site that lasts longer than 1 day    MAKE SURE YOU:    - Understand these instructions  - Watch your condition  - Get help right away if you are not doing well or if you get worse

## 2023-04-30 DIAGNOSIS — E11.22 TYPE 2 DIABETES MELLITUS WITH STAGE 3 CHRONIC KIDNEY DISEASE, WITHOUT LONG-TERM CURRENT USE OF INSULIN, UNSPECIFIED WHETHER STAGE 3A OR 3B CKD (H): ICD-10-CM

## 2023-04-30 DIAGNOSIS — N18.30 TYPE 2 DIABETES MELLITUS WITH STAGE 3 CHRONIC KIDNEY DISEASE, WITHOUT LONG-TERM CURRENT USE OF INSULIN, UNSPECIFIED WHETHER STAGE 3A OR 3B CKD (H): ICD-10-CM

## 2023-04-30 DIAGNOSIS — E66.01 MORBID OBESITY DUE TO EXCESS CALORIES (H): Chronic | ICD-10-CM

## 2023-05-01 RX ORDER — GLIMEPIRIDE 4 MG/1
4 TABLET ORAL
Qty: 90 TABLET | Refills: 0 | Status: SHIPPED | OUTPATIENT
Start: 2023-05-01 | End: 2023-08-08

## 2023-05-02 NOTE — TELEPHONE ENCOUNTER
Routing refill request to provider for review/approval because:  Ok to continue?     Claire SCHAEFFER RN

## 2023-05-03 RX ORDER — TOPIRAMATE 100 MG/1
TABLET, FILM COATED ORAL
Qty: 180 TABLET | Refills: 0 | Status: SHIPPED | OUTPATIENT
Start: 2023-05-03 | End: 2023-08-08

## 2023-05-12 DIAGNOSIS — M75.42 ROTATOR CUFF IMPINGEMENT SYNDROME OF LEFT SHOULDER: ICD-10-CM

## 2023-05-12 DIAGNOSIS — M25.512 CHRONIC LEFT SHOULDER PAIN: ICD-10-CM

## 2023-05-12 DIAGNOSIS — G89.29 CHRONIC LEFT SHOULDER PAIN: ICD-10-CM

## 2023-05-19 DIAGNOSIS — E11.22 TYPE 2 DIABETES MELLITUS WITH STAGE 3 CHRONIC KIDNEY DISEASE, WITHOUT LONG-TERM CURRENT USE OF INSULIN, UNSPECIFIED WHETHER STAGE 3A OR 3B CKD (H): ICD-10-CM

## 2023-05-19 DIAGNOSIS — N18.30 TYPE 2 DIABETES MELLITUS WITH STAGE 3 CHRONIC KIDNEY DISEASE, WITHOUT LONG-TERM CURRENT USE OF INSULIN, UNSPECIFIED WHETHER STAGE 3A OR 3B CKD (H): ICD-10-CM

## 2023-05-19 DIAGNOSIS — I10 HTN, GOAL BELOW 140/90: ICD-10-CM

## 2023-05-19 DIAGNOSIS — F33.1 MAJOR DEPRESSIVE DISORDER, RECURRENT EPISODE, MODERATE (H): ICD-10-CM

## 2023-05-19 DIAGNOSIS — E78.5 HYPERLIPIDEMIA LDL GOAL <100: ICD-10-CM

## 2023-05-19 NOTE — TELEPHONE ENCOUNTER
Routing refill request to provider for review/approval because:  Elevated PHQ-9  Anna Renteria RN, BSN  [facility]

## 2023-05-23 ENCOUNTER — MYC MEDICAL ADVICE (OUTPATIENT)
Dept: FAMILY MEDICINE | Facility: CLINIC | Age: 63
End: 2023-05-23
Payer: COMMERCIAL

## 2023-05-23 RX ORDER — BUPROPION HYDROCHLORIDE 300 MG/1
TABLET ORAL
Qty: 90 TABLET | Refills: 0 | Status: SHIPPED | OUTPATIENT
Start: 2023-05-23 | End: 2023-08-22

## 2023-05-23 RX ORDER — PIOGLITAZONEHYDROCHLORIDE 30 MG/1
TABLET ORAL
Qty: 90 TABLET | Refills: 0 | Status: SHIPPED | OUTPATIENT
Start: 2023-05-23 | End: 2023-08-22

## 2023-05-23 RX ORDER — LISINOPRIL 5 MG/1
TABLET ORAL
Qty: 90 TABLET | Refills: 0 | Status: SHIPPED | OUTPATIENT
Start: 2023-05-23 | End: 2023-08-22

## 2023-05-23 RX ORDER — EMPAGLIFLOZIN 25 MG/1
TABLET, FILM COATED ORAL
Qty: 90 TABLET | Refills: 0 | Status: SHIPPED | OUTPATIENT
Start: 2023-05-23 | End: 2023-08-22

## 2023-05-23 RX ORDER — ATORVASTATIN CALCIUM 10 MG/1
TABLET, FILM COATED ORAL
Qty: 90 TABLET | Refills: 0 | Status: SHIPPED | OUTPATIENT
Start: 2023-05-23 | End: 2023-08-22

## 2023-05-23 NOTE — TELEPHONE ENCOUNTER
See  message, not addressed.  Dr Lucas out of office.  Routed to POD, Landry Renee please advise.    Anna Renteria RN, BSN  [facility]

## 2023-06-01 ENCOUNTER — HEALTH MAINTENANCE LETTER (OUTPATIENT)
Age: 63
End: 2023-06-01

## 2023-06-26 PROBLEM — G89.29 CHRONIC LEFT SHOULDER PAIN: Status: RESOLVED | Noted: 2023-04-03 | Resolved: 2023-06-26

## 2023-06-26 PROBLEM — M25.512 CHRONIC LEFT SHOULDER PAIN: Status: RESOLVED | Noted: 2023-04-03 | Resolved: 2023-06-26

## 2023-06-26 NOTE — PROGRESS NOTES
Discharge Note    Progress reporting period is from initial evaluation date (please see noted date below) to Apr 24, 2023.  Linked Episodes   Type: Episode: Status: Noted: Resolved: Last update: Updated by:   PHYSICAL THERAPY L Shoulder Pain Active 4/3/2023  4/24/2023  8:43 AM Bhargavi Mendez      Comments:       Susan failed to follow up and current status is unknown.  Please see information below for last relevant information on current status.  Patient seen for 4 visits.    SUBJECTIVE  Subjective changes noted by patient:  Reaching forward and overhead, washing hair, lifting pillow over body in bed and leaning on elbow are all improved. Still having pain in left upper and lower arm and shoulder blade while driving with left arm resting on console and reaching out to side especially with ER, or reaching behind back are still bothersome. Sees MD next week, possible injeciton.  .  Current pain level is 0/10.     Previous pain level was  8/10.   Changes in function:  Yes (See Goal flowsheet attached for changes in current functional level)  Adverse reaction to treatment or activity: None    OBJECTIVE  Changes noted in objective findings: Shoulder AROM L: vrlq=890 (improved from 150), abd=148 (improved from 95), ext/IR=L1 (improved from belt line). CAROM: flex=100%, ext=75% (improved from 66%), ZP=856%, LR=95% (improved from 90%), IDF=586%, KHL=852% (L neck pain).     ASSESSMENT/PLAN  Diagnosis: L shoulder pain   Updated problem list and treatment plan:   No updated problem list or treatment plan as patient did not return to PT and they are discharged at this time.    STG/LTGs have been met or progress has been made towards goals:  Yes, please see goal flowsheet for most current information  Assessment of Progress: current status is unknown.    Last current status:     Self Management Plans:  HEP  I have re-evaluated this patient and find that the nature, scope, duration and intensity of the therapy is appropriate  for the medical condition of the patient.  Susan continues to require the following intervention to meet STG and LTG's:  HEP.    Recommendations:  Discharge with current home program.  Patient to follow up with MD as needed.    Please refer to the daily flowsheet for treatment today, total treatment time and time spent performing 1:1 timed codes.

## 2023-08-04 DIAGNOSIS — E66.01 MORBID OBESITY DUE TO EXCESS CALORIES (H): Chronic | ICD-10-CM

## 2023-08-04 DIAGNOSIS — N18.30 TYPE 2 DIABETES MELLITUS WITH STAGE 3 CHRONIC KIDNEY DISEASE, WITHOUT LONG-TERM CURRENT USE OF INSULIN, UNSPECIFIED WHETHER STAGE 3A OR 3B CKD (H): ICD-10-CM

## 2023-08-04 DIAGNOSIS — E11.22 TYPE 2 DIABETES MELLITUS WITH STAGE 3 CHRONIC KIDNEY DISEASE, WITHOUT LONG-TERM CURRENT USE OF INSULIN, UNSPECIFIED WHETHER STAGE 3A OR 3B CKD (H): ICD-10-CM

## 2023-08-07 NOTE — TELEPHONE ENCOUNTER
Routing refill request to provider for review/approval because:  Sarahi given x1 and patient did not follow up, please advise  Labs out of range:  CBC    Patient scheduled for follow-up with PCP 9/20/23    Danette MATHIAS RN, BSN, PHN  [facility]  [phone]

## 2023-08-08 RX ORDER — GLIMEPIRIDE 4 MG/1
TABLET ORAL
Qty: 90 TABLET | Refills: 0 | Status: SHIPPED | OUTPATIENT
Start: 2023-08-08 | End: 2023-11-14

## 2023-08-08 RX ORDER — TOPIRAMATE 100 MG/1
TABLET, FILM COATED ORAL
Qty: 180 TABLET | Refills: 0 | Status: SHIPPED | OUTPATIENT
Start: 2023-08-08 | End: 2023-09-20

## 2023-08-20 DIAGNOSIS — E11.22 TYPE 2 DIABETES MELLITUS WITH STAGE 3 CHRONIC KIDNEY DISEASE, WITHOUT LONG-TERM CURRENT USE OF INSULIN, UNSPECIFIED WHETHER STAGE 3A OR 3B CKD (H): ICD-10-CM

## 2023-08-20 DIAGNOSIS — I10 HTN, GOAL BELOW 140/90: ICD-10-CM

## 2023-08-20 DIAGNOSIS — N18.30 TYPE 2 DIABETES MELLITUS WITH STAGE 3 CHRONIC KIDNEY DISEASE, WITHOUT LONG-TERM CURRENT USE OF INSULIN, UNSPECIFIED WHETHER STAGE 3A OR 3B CKD (H): ICD-10-CM

## 2023-08-20 DIAGNOSIS — F33.1 MAJOR DEPRESSIVE DISORDER, RECURRENT EPISODE, MODERATE (H): ICD-10-CM

## 2023-08-20 DIAGNOSIS — E78.5 HYPERLIPIDEMIA LDL GOAL <100: ICD-10-CM

## 2023-08-22 RX ORDER — EMPAGLIFLOZIN 25 MG/1
TABLET, FILM COATED ORAL
Qty: 90 TABLET | Refills: 0 | Status: SHIPPED | OUTPATIENT
Start: 2023-08-22 | End: 2023-12-05

## 2023-08-22 RX ORDER — ATORVASTATIN CALCIUM 10 MG/1
TABLET, FILM COATED ORAL
Qty: 90 TABLET | Refills: 0 | Status: SHIPPED | OUTPATIENT
Start: 2023-08-22 | End: 2023-12-05

## 2023-08-22 RX ORDER — PIOGLITAZONEHYDROCHLORIDE 30 MG/1
TABLET ORAL
Qty: 90 TABLET | Refills: 0 | Status: SHIPPED | OUTPATIENT
Start: 2023-08-22 | End: 2023-12-05

## 2023-08-22 RX ORDER — LISINOPRIL 5 MG/1
TABLET ORAL
Qty: 90 TABLET | Refills: 0 | Status: SHIPPED | OUTPATIENT
Start: 2023-08-22 | End: 2023-12-05

## 2023-08-22 RX ORDER — BUPROPION HYDROCHLORIDE 300 MG/1
TABLET ORAL
Qty: 90 TABLET | Refills: 0 | Status: SHIPPED | OUTPATIENT
Start: 2023-08-22 | End: 2023-12-05

## 2023-08-22 NOTE — TELEPHONE ENCOUNTER
Routing refill request to provider for review/approval because:      7/1/2021    11:23 AM 10/12/2022     9:51 AM 2/27/2023     1:57 PM   PHQ   PHQ-9 Total Score 13 13 7   Q9: Thoughts of better off dead/self-harm past 2 weeks Not at all Not at all Not at all      PHQ9 score above 5.    Next 5 appointments (look out 90 days)      Sep 20, 2023 11:00 AM  (Arrive by 10:40 AM)  Provider Visit with Concha Lucas MD  [facility] ([facility] ) [address] [facility] [address] [phone]             Letitia Leal RN

## 2023-09-10 ENCOUNTER — HEALTH MAINTENANCE LETTER (OUTPATIENT)
Age: 63
End: 2023-09-10

## 2023-09-12 ENCOUNTER — PATIENT OUTREACH (OUTPATIENT)
Dept: CARE COORDINATION | Facility: CLINIC | Age: 63
End: 2023-09-12
Payer: COMMERCIAL

## 2023-09-20 ENCOUNTER — OFFICE VISIT (OUTPATIENT)
Dept: FAMILY MEDICINE | Facility: CLINIC | Age: 63
End: 2023-09-20
Payer: COMMERCIAL

## 2023-09-20 VITALS
HEART RATE: 84 BPM | HEIGHT: 65 IN | SYSTOLIC BLOOD PRESSURE: 119 MMHG | OXYGEN SATURATION: 97 % | RESPIRATION RATE: 16 BRPM | TEMPERATURE: 97 F | WEIGHT: 225.7 LBS | DIASTOLIC BLOOD PRESSURE: 81 MMHG | BODY MASS INDEX: 37.61 KG/M2

## 2023-09-20 DIAGNOSIS — I10 HTN, GOAL BELOW 140/90: ICD-10-CM

## 2023-09-20 DIAGNOSIS — M25.512 CHRONIC LEFT SHOULDER PAIN: ICD-10-CM

## 2023-09-20 DIAGNOSIS — L82.0 INFLAMED SEBORRHEIC KERATOSIS: ICD-10-CM

## 2023-09-20 DIAGNOSIS — M79.661 BILATERAL CALF PAIN: ICD-10-CM

## 2023-09-20 DIAGNOSIS — Z00.00 ROUTINE GENERAL MEDICAL EXAMINATION AT A HEALTH CARE FACILITY: Primary | ICD-10-CM

## 2023-09-20 DIAGNOSIS — M79.662 BILATERAL CALF PAIN: ICD-10-CM

## 2023-09-20 DIAGNOSIS — E11.9 TYPE 2 DIABETES MELLITUS WITHOUT COMPLICATION, WITHOUT LONG-TERM CURRENT USE OF INSULIN (H): ICD-10-CM

## 2023-09-20 DIAGNOSIS — G89.29 CHRONIC LEFT SHOULDER PAIN: ICD-10-CM

## 2023-09-20 LAB
HBA1C MFR BLD: 7.9 % (ref 0–5.6)
HOLD SPECIMEN: NORMAL

## 2023-09-20 PROCEDURE — 90471 IMMUNIZATION ADMIN: CPT | Performed by: FAMILY MEDICINE

## 2023-09-20 PROCEDURE — 36415 COLL VENOUS BLD VENIPUNCTURE: CPT | Performed by: FAMILY MEDICINE

## 2023-09-20 PROCEDURE — 99213 OFFICE O/P EST LOW 20 MIN: CPT | Mod: 25 | Performed by: FAMILY MEDICINE

## 2023-09-20 PROCEDURE — 82306 VITAMIN D 25 HYDROXY: CPT | Performed by: FAMILY MEDICINE

## 2023-09-20 PROCEDURE — 83036 HEMOGLOBIN GLYCOSYLATED A1C: CPT | Performed by: FAMILY MEDICINE

## 2023-09-20 PROCEDURE — 99396 PREV VISIT EST AGE 40-64: CPT | Mod: 25 | Performed by: FAMILY MEDICINE

## 2023-09-20 PROCEDURE — 90682 RIV4 VACC RECOMBINANT DNA IM: CPT | Performed by: FAMILY MEDICINE

## 2023-09-20 RX ORDER — LORAZEPAM 1 MG/1
1 TABLET ORAL EVERY 6 HOURS PRN
Qty: 5 TABLET | Refills: 0 | Status: SHIPPED | OUTPATIENT
Start: 2023-09-20

## 2023-09-20 ASSESSMENT — PATIENT HEALTH QUESTIONNAIRE - PHQ9
SUM OF ALL RESPONSES TO PHQ QUESTIONS 1-9: 9
SUM OF ALL RESPONSES TO PHQ QUESTIONS 1-9: 9
10. IF YOU CHECKED OFF ANY PROBLEMS, HOW DIFFICULT HAVE THESE PROBLEMS MADE IT FOR YOU TO DO YOUR WORK, TAKE CARE OF THINGS AT HOME, OR GET ALONG WITH OTHER PEOPLE: NOT DIFFICULT AT ALL

## 2023-09-20 ASSESSMENT — ANXIETY QUESTIONNAIRES
7. FEELING AFRAID AS IF SOMETHING AWFUL MIGHT HAPPEN: NOT AT ALL
1. FEELING NERVOUS, ANXIOUS, OR ON EDGE: SEVERAL DAYS
3. WORRYING TOO MUCH ABOUT DIFFERENT THINGS: NOT AT ALL
GAD7 TOTAL SCORE: 2
6. BECOMING EASILY ANNOYED OR IRRITABLE: NOT AT ALL
5. BEING SO RESTLESS THAT IT IS HARD TO SIT STILL: SEVERAL DAYS
GAD7 TOTAL SCORE: 2
IF YOU CHECKED OFF ANY PROBLEMS ON THIS QUESTIONNAIRE, HOW DIFFICULT HAVE THESE PROBLEMS MADE IT FOR YOU TO DO YOUR WORK, TAKE CARE OF THINGS AT HOME, OR GET ALONG WITH OTHER PEOPLE: NOT DIFFICULT AT ALL
2. NOT BEING ABLE TO STOP OR CONTROL WORRYING: NOT AT ALL
4. TROUBLE RELAXING: NOT AT ALL

## 2023-09-20 ASSESSMENT — PAIN SCALES - GENERAL: PAINLEVEL: NO PAIN (0)

## 2023-09-20 NOTE — PATIENT INSTRUCTIONS
Topamax will be tapered, one tab daily for 1 week, then 1/2 tab daily for 1 week, then stop  Consider Stopping  glimepiride depending on A1c  Start ozempic

## 2023-09-20 NOTE — PROGRESS NOTES
"  Assessment & Plan     HTN, goal below 140/90  At goal, cont same  - COMPREHENSIVE METABOLIC PANEL; Future  - Lipid panel reflex to direct LDL Fasting; Future  - Vitamin D Deficiency; Future  - Hemoglobin A1c; Future  - Hemoglobin A1c  - Vitamin D Deficiency    Type 2 diabetes mellitus without complication, without long-term current use of insulin (H)  Waiting on A1c, if normal, ok to stop glimiperide , add ozempic  - semaglutide (OZEMPIC) 2 MG/3ML pen; Inject 0.25 mg Subcutaneous every 7 days Low dose x 1 month, then increase to .5    Chronic left shoulder pain  Work up so far not helpful, will check MRI  - MR Shoulder Left w/o Contrast; Future  - LORazepam (ATIVAN) 1 MG tablet; Take 1 tablet (1 mg) by mouth every 6 hours as needed for anxiety    Inflamed seborrheic keratosis  Follow up with derm    Bilateral calf pain  Suspect lipomas, pt very concerned, feels these are new, will check soft tissue US  - US Lower Extremity Non Vascular Right; Future  - US Lower Extremity Non Vascular Left; Future    Ordering of each unique test  Prescription drug management         BMI:   Estimated body mass index is 37.56 kg/m  as calculated from the following:    Height as of this encounter: 1.651 m (5' 5\").    Weight as of this encounter: 102.4 kg (225 lb 11.2 oz).   Weight management plan: Discussed healthy diet and exercise guidelines    Work on weight loss  Regular exercise    Concha Lucas MD  [facility]    Rosie Garcia is a 63 year old, presenting for the following health issues:  Diabetes        9/20/2023    10:42 AM   Additional Questions   Roomed by Kathie FARAH CMA     History of Present Illness       Diabetes:   She presents for follow up of diabetes.  She is checking home blood glucose one time daily.   She checks blood glucose before meals.  Blood glucose is never over 200 and never under 70. She is aware of hypoglycemia symptoms including shakiness, weakness and blurred " "vision.   She is concerned about other.   She is having excessive thirst and weight gain.            She eats 0-1 servings of fruits and vegetables daily.She consumes 0 sweetened beverage(s) daily.She exercises with enough effort to increase her heart rate 9 or less minutes per day.  She exercises with enough effort to increase her heart rate 3 or less days per week.   She is taking medications regularly.     Annual Wellness Visit    Patient has been advised of split billing requirements and indicates understanding: Yes     Physical Health:  In general, how would you rate your overall physical health? good  Outside of work, how many days during the week do you exercise?2-3 days/week  Outside of work, approximately how many minutes a day do you exercise?15-30 minutes  If you drink alcohol do you typically have >3 drinks per day or >7 drinks per week? No  Do you usually eat at least 4 servings of fruit and vegetables a day, include whole grains & fiber and avoid regularly eating high fat or \"junk\" foods? No  Do you have any problems taking medications regularly? No  Do you have any side effects from medications? none  Needs assistance for the following daily activities: no assistance needed  Which of the following safety concerns are present in your home?  none identified   Hearing impairment: No  In the past 6 months, have you been bothered by leaking of urine? no    Under right breast, skin issue, wants this checked.    UTD on colon screening    Vit D3 , 5000 per day    Left arm, still with pain, after covid vaccine? Nowhad cortisone shot, and that helped and still painful to raise above her head, ROM is limited, and mid arm, shoulder, and elbow pain and back of shoulder blade.    Lab Results   Component Value Date    A1C 7.4 02/27/2023    A1C 6.8 10/12/2022    A1C 6.8 04/14/2022    A1C 9.6 01/06/2022    A1C 8.5 07/01/2021    A1C 8.9 04/28/2021    A1C 7.6 12/07/2020    A1C 8.0 06/29/2020    A1C 9.4 01/27/2020      " Migraines are better, maybe due to topamax, no side effects.    Mental Health:  In general, how would you rate your overall mental or emotional health? fair    Today's PHQ-9 Score:       2023    10:15 AM   PHQ-9 SCORE   PHQ-9 Total Score MyChart 9 (Mild depression)   PHQ-9 Total Score 9     Do you feel safe in your environment? Yes    Have you ever done Advance Care Planning? (For example, a Health Directive, POLST, or a discussion with a medical provider or your loved ones about your wishes)? No, advance care planning information given to patient to review.  Patient declined advance care planning discussion at this time.    Fall risk:  Fallen 2 or more times in the past year?: No  Any fall with injury in the past year?: No    Do you have sleep apnea, excessive snoring or daytime drowsiness? : yes    Social History     Tobacco Use    Smoking status: Former     Packs/day: 1.00     Years: 5.00     Pack years: 5.00     Types: Cigarettes     Quit date: 1985     Years since quittin.7    Smokeless tobacco: Never    Tobacco comments:     quit in  smoked one pack a day   Substance Use Topics    Alcohol use: Yes     Comment: Minimal         2021     8:38 PM   Alcohol Use   Prescreen: >3 drinks/day or >7 drinks/week? No     Do you have a current opioid prescription? No  Do you use any other controlled substances or medications that are not prescribed by a provider? None    Current providers sharing in care for this patient include:   Patient Care Team:  Concha Lucas MD as PCP - General (Family Practice)  Namrata Walker RPH as Pharmacist (Pharmacist)  Concha Lucas MD as Assigned PCP  Laure Tan RD as Diabetes Educator (Dietitian, Registered)  Chelsie Wilson MD as Assigned Musculoskeletal Provider    The following health maintenance items are reviewed in Epic and correct as of today:  Health Maintenance   Topic Date Due    DIABETIC FOOT EXAM  2022     "ANNUAL REVIEW OF HM ORDERS  01/06/2023    CMP  04/12/2023    LIPID  04/12/2023    A1C  05/27/2023    INFLUENZA VACCINE (1) 09/01/2023    YEARLY PREVENTIVE VISIT  10/12/2023    MICROALBUMIN  02/27/2024    PHQ-9  03/20/2024    EYE EXAM  04/25/2024    MAMMO SCREENING  10/25/2024    HPV TEST  07/01/2026    ADVANCE CARE PLANNING  07/01/2026    PAP  07/01/2026    DTAP/TDAP/TD IMMUNIZATION (3 - Td or Tdap) 02/27/2027    COLORECTAL CANCER SCREENING  01/30/2033    HEPATITIS C SCREENING  Completed    DEPRESSION ACTION PLAN  Completed    MIGRAINE ACTION PLAN  Completed    Pneumococcal Vaccine: Pediatrics (0 to 5 Years) and At-Risk Patients (6 to 64 Years)  Completed    ZOSTER IMMUNIZATION  Completed    COVID-19 Vaccine  Completed    HIV SCREENING  Addressed    IPV IMMUNIZATION  Aged Out    HPV IMMUNIZATION  Aged Out    MENINGITIS IMMUNIZATION  Aged Out    ALT  Discontinued    BMP  Discontinued     Patient has been advised of split billing requirements and indicates understanding: Yes    Appropriate preventive services were discussed with this patient, including applicable screening as appropriate for fall prevention, nutrition, physical activity, Tobacco-use cessation, weight loss and cognition.  Checklist reviewing preventive services available has been given to the patient.      Review of Systems   Constitutional, HEENT, cardiovascular, pulmonary, GI, , musculoskeletal, neuro, skin, endocrine and psych systems are negative, except as otherwise noted.      Objective    /81 (BP Location: Right arm, Patient Position: Sitting, Cuff Size: Adult Large)   Pulse 84   Temp 97  F (36.1  C) (Temporal)   Resp 16   Ht 1.651 m (5' 5\")   Wt 102.4 kg (225 lb 11.2 oz)   LMP 10/03/2010   SpO2 97%   BMI 37.56 kg/m    Body mass index is 37.56 kg/m .  Physical Exam   GENERAL: healthy, alert and no distress  EYES: Eyes grossly normal to inspection, and conjunctivae and sclerae normal  HENT: ear canals and TM's normal, nose and " mouth without ulcers or lesions  NECK: no adenopathy, no asymmetry, masses, or scars and thyroid normal to palpation  RESP: lungs clear to auscultation - no rales, rhonchi or wheezes  CV: regular rate and rhythm, normal S1 S2, no S3 or S4, no murmur, click or rub, no peripheral edema and peripheral pulses strong  ABDOMEN: soft, nontender, no hepatosplenomegaly, no masses and bowel sounds normal  MS: no gross musculoskeletal defects noted, no edema  Thumb exam, painful at base, FROM and normlal strength  Shoulder exam, right side, limited ROM, positive for pain above 90 degrees, and empty can test  SKIN: no suspicious lesions or rashes  SK irritated below right breast  NEURO: Normal strength and tone, mentation intact and speech normal  PSYCH: mentation appears normal, affect normal/bright

## 2023-09-22 LAB — DEPRECATED CALCIDIOL+CALCIFEROL SERPL-MC: 57 UG/L (ref 20–75)

## 2023-09-26 ENCOUNTER — PATIENT OUTREACH (OUTPATIENT)
Dept: CARE COORDINATION | Facility: CLINIC | Age: 63
End: 2023-09-26
Payer: COMMERCIAL

## 2023-09-26 ENCOUNTER — HOSPITAL ENCOUNTER (OUTPATIENT)
Dept: ULTRASOUND IMAGING | Facility: CLINIC | Age: 63
Discharge: HOME | End: 2023-09-26
Attending: FAMILY MEDICINE
Payer: COMMERCIAL

## 2023-09-26 DIAGNOSIS — M79.662 BILATERAL CALF PAIN: ICD-10-CM

## 2023-09-26 DIAGNOSIS — M79.661 BILATERAL CALF PAIN: ICD-10-CM

## 2023-09-26 PROCEDURE — 76882 US LMTD JT/FCL EVL NVASC XTR: CPT | Mod: 50

## 2023-10-01 ENCOUNTER — HOSPITAL ENCOUNTER (OUTPATIENT)
Dept: MRI IMAGING | Facility: CLINIC | Age: 63
Discharge: HOME | End: 2023-10-01
Attending: FAMILY MEDICINE | Admitting: FAMILY MEDICINE
Payer: COMMERCIAL

## 2023-10-01 DIAGNOSIS — M25.512 CHRONIC LEFT SHOULDER PAIN: ICD-10-CM

## 2023-10-01 DIAGNOSIS — G89.29 CHRONIC LEFT SHOULDER PAIN: ICD-10-CM

## 2023-10-01 PROCEDURE — 73221 MRI JOINT UPR EXTREM W/O DYE: CPT | Mod: LT

## 2023-10-24 ENCOUNTER — MYC MEDICAL ADVICE (OUTPATIENT)
Dept: FAMILY MEDICINE | Facility: CLINIC | Age: 63
End: 2023-10-24
Payer: COMMERCIAL

## 2023-10-24 DIAGNOSIS — M75.102 ROTATOR CUFF SYNDROME, LEFT: Primary | ICD-10-CM

## 2023-10-24 DIAGNOSIS — M25.512 LEFT SHOULDER PAIN: ICD-10-CM

## 2023-10-24 NOTE — TELEPHONE ENCOUNTER
Routing to Dr. Lucas, please see [employer] message. Patient had MRI of shoulder, she is wondering if she needs to follow-up with ortho?    Florinda Crenshaw RN on 10/24/2023 at 11:37 AM

## 2023-11-06 DIAGNOSIS — E11.9 TYPE 2 DIABETES MELLITUS WITHOUT COMPLICATION, WITHOUT LONG-TERM CURRENT USE OF INSULIN (H): ICD-10-CM

## 2023-11-06 RX ORDER — SEMAGLUTIDE 0.68 MG/ML
INJECTION, SOLUTION SUBCUTANEOUS
Qty: 3 ML | Refills: 1 | Status: SHIPPED | OUTPATIENT
Start: 2023-11-06 | End: 2023-12-20

## 2023-11-08 ENCOUNTER — OFFICE VISIT (OUTPATIENT)
Dept: ORTHOPEDICS | Facility: CLINIC | Age: 63
End: 2023-11-08
Payer: COMMERCIAL

## 2023-11-08 VITALS
HEIGHT: 65 IN | SYSTOLIC BLOOD PRESSURE: 117 MMHG | DIASTOLIC BLOOD PRESSURE: 74 MMHG | BODY MASS INDEX: 37.49 KG/M2 | WEIGHT: 225 LBS

## 2023-11-08 DIAGNOSIS — M75.102 ROTATOR CUFF SYNDROME, LEFT: ICD-10-CM

## 2023-11-08 DIAGNOSIS — M75.52 SUBACROMIAL BURSITIS OF LEFT SHOULDER JOINT: Primary | ICD-10-CM

## 2023-11-08 PROCEDURE — 20611 DRAIN/INJ JOINT/BURSA W/US: CPT | Mod: LT | Performed by: STUDENT IN AN ORGANIZED HEALTH CARE EDUCATION/TRAINING PROGRAM

## 2023-11-08 PROCEDURE — 99214 OFFICE O/P EST MOD 30 MIN: CPT | Mod: 25 | Performed by: STUDENT IN AN ORGANIZED HEALTH CARE EDUCATION/TRAINING PROGRAM

## 2023-11-08 RX ORDER — LIDOCAINE HYDROCHLORIDE 20 MG/ML
5 INJECTION, SOLUTION INFILTRATION; PERINEURAL
Status: SHIPPED | OUTPATIENT
Start: 2023-11-08

## 2023-11-08 RX ORDER — TRIAMCINOLONE ACETONIDE 40 MG/ML
40 INJECTION, SUSPENSION INTRA-ARTICULAR; INTRAMUSCULAR
Status: SHIPPED | OUTPATIENT
Start: 2023-11-08

## 2023-11-08 RX ADMIN — TRIAMCINOLONE ACETONIDE 40 MG: 40 INJECTION, SUSPENSION INTRA-ARTICULAR; INTRAMUSCULAR at 09:48

## 2023-11-08 RX ADMIN — LIDOCAINE HYDROCHLORIDE 5 ML: 20 INJECTION, SOLUTION INFILTRATION; PERINEURAL at 09:48

## 2023-11-08 NOTE — PROGRESS NOTES
ASSESSMENT & PLAN    Radha was seen today for pain.    Diagnoses and all orders for this visit:    Rotator cuff syndrome, left  -     Orthopedic  Referral      63-year-old female presenting for evaluation of chronic left shoulder pain.  Since previous visits has had mild improvement in pain.  Had MRI which showed partial thickness tear of supraspinatus, degenerative joint changes, and acromial bursopathy.   At this time she is looking for next steps to help with pain relief.  We will continue to recommend conservative treatment at this time.  Last subacromial corticosteroid injection 4/2023 with some relief.  Discussed surgical and nonsurgical options.  She is agreeable to trial another subacromial bursitis injection given relief in past and bursitis seen on MRI.  See procedure note below. if no improvement in symptoms can consider glenohumeral joint injection to help with her arthritis symptoms. She will continue home exercises that she learned in physical therapy to help with strength and joint range of motion. Follow-up as needed if pain not improved in 1 month.     This issue is acute on chronic and Improving.    Nano Sykes,   [facility] [place]    SUBJECTIVE- Interim History November 8, 2023    Chief Complaint   Patient presents with    Left Shoulder - Pain       Susan Dietrich is a 63 year old female who is seen in f/u up for Rotator cuff syndrome, left. Since last visit on 04/26/23 in which they received a L subacromial bursa steroid injection patient reported that the shoulder has been feeling a little bit better.  - Now ~ 1 year from initial onset    Worsened by: Laying on her shoulder and putting hand behind her back.  Better with: Rest  Treatments tried: rest/activity avoidance, Aleve, physical therapy (5 visits), and corticosteroid injection (most recent date: 04/26/23) that provided no relief  Associated symptoms:  Clicking    The patient is seen by  themselves.  The patient is Right handed    Orthopedic/Surgical history: NO  Social History/Occupation: Works in an office      REVIEW OF SYSTEMS:  10 point ROS is negative other than symptoms noted above in HPI, Past Medical History or as stated below  Constitutional: NEGATIVE for fever, chills, change in weight  Skin: NEGATIVE for worrisome rashes, moles or lesions  GI/: NEGATIVE for bowel or bladder changes  Neuro: NEGATIVE for weakness, dizziness or paresthesias      OBJECTIVE:  LMP 10/03/2010    General: healthy, alert and in no distress  Skin: no suspicious lesions or rash.  CV: distal perfusion intact upper extremity  Resp: normal respiratory effort without conversational dyspnea   Psych: normal mood and affect  Gait: NORMAL  Neuro: Normal light sensory exam of upper extremity intact    LEFT SHOULDER  Inspection:    no swelling, bruising, discoloration, or obvious deformity or asymmetry.  Palpation:    Tender about the greater tuberosity. Remainder of bony and tendinous landmarks are nontender.    Crepitus is Present  Active Range of Motion:     Abduction 1800 / FF 1800 /  / IR L4.  Opposite arm abduction/flexion/ER full, IR L4  Painful arc  Strength:  5 out of 5 shoulder flexion.  Special Tests:    Positive: supraspinatus (empty can) painful but strength bilateral    Negative: Priest', lift-off, and Speed's      RADIOLOGY:  Final results and radiologist's interpretation, available in the [facility] health record.  Images were reviewed with the patient in the office today.  Reviewed previous imaging and agree with interpretation below:    Narrative & Impression   EXAM: MR SHOULDER LEFT W/O CONTRAST  LOCATION: [facility]  DATE: 10/1/2023     INDICATION: Chronic left shoulder pain.  COMPARISON: MRI left shoulder 11/28/2003. Left shoulder radiographs 03/22/2023.  TECHNIQUE: Unenhanced.     FINDINGS:     ROTATOR CUFF:  -Supraspinatus: Mild tendinosis. Low-grade articular-sided tear of  the supraspinatus centrally extending 0.3 cm anterior to posterior (series 5 image 18). No full-thickness tear, retraction, or atrophy.  -Infraspinatus: Mild tendinosis. No tendon tear or atrophy.  -Subscapularis: Mild tendinosis. No tendon tear or atrophy.  -Teres minor: No tendon tear, tendinopathy or fatty atrophy.     CORACOACROMIAL ARCH:  -Morphology: Type II acromion. No subacromial spur. Lateral downsloping of the acromion slightly narrows the subacromial space.   -Bursa: Edema or trace fluid in the subacromial subdeltoid bursa.     ACROMIOCLAVICULAR JOINT:   -Mild degenerative arthritis of the acromioclavicular joint with mild capsular thickening. Intact coracoclavicular and coracoacromial ligaments.      LONG HEAD OF BICEPS TENDON:   -No tendinopathy or tear. No tenosynovitis or subluxation.     GLENOHUMERAL JOINT:   -Labrum: Mild degenerative change of the glenoid labrum. No displaced labral tear or paralabral cyst.   -Cartilage: Mild diffuse thinning of the articular cartilage of the glenohumeral joint with ill-defined areas of articular cartilage irregularity and fibrillation (series 5 image 19).   -Joint space: No effusion or synovitis.  -Glenohumeral ligaments and capsule: No pericapsular inflammation.     BONES:   -No fracture or concerning marrow replacing lesion.     SOFT TISSUES:   -Normal deltoid muscle bulk. Normal visualized chest wall and axilla.                                                                      IMPRESSION:     1.  Mild rotator cuff tendinosis with low-grade articular-sided partial-thickness tearing of the supraspinatus. No full-thickness rotator cuff tear.  2.  Mild degenerative arthritis of the acromioclavicular and glenohumeral joints with degenerative changes of the glenoid labrum.  3.  Trace fluid or edema in the region of the subacromial/subdeltoid bursa suggestive of bursopathy.        Narrative & Impression   XR SHOULDER LEFT G/E 3 VIEWS   3/22/2023 8:44 AM       HISTORY: Chronic left shoulder pain; Chronic left shoulder pain  COMPARISON: None.                                                                       IMPRESSION: Normal alignment. No fracture. Minimal degenerative  arthrosis of the AC joint. There may be a tiny calcification  projecting near the lesser tuberosity, which could represent minimal  hydroxyapatite deposition.     KIM REID MD        Large Joint Injection/Arthocentesis: L subacromial bursa    Date/Time: 11/8/2023 9:48 AM    Performed by: Nano Sykes MD  Authorized by: Nano Sykes MD    Indications:  Pain  Needle Size:  25 G  Guidance: ultrasound    Approach:  Posterior  Location:  Shoulder      Site:  L subacromial bursa  Medications:  5 mL lidocaine 2 %; 40 mg triamcinolone 40 MG/ML  Outcome:  Tolerated well, no immediate complications  Procedure discussed: discussed risks, benefits, and alternatives    Consent Given by:  Patient  Timeout: timeout called immediately prior to procedure    Prep: patient was prepped and draped in usual sterile fashion     Ultrasound was used to ensure safe and accurate needle placement into bursa and injection. Ultrasound images of the procedure were permanently stored.          Greater than 30 minutes spent by me on the date of the encounter doing chart review, review of outside records, review of test results, and interpretation of tests documentation, patient visit. If procedure performed, this was separate from time with procedure.

## 2023-11-08 NOTE — PATIENT INSTRUCTIONS
1. Subacromial bursitis of left shoulder joint    2. Rotator cuff syndrome, left      -Voltaren gel as needed up to three times per day  --Tylenol 500-1000mg (up to three times per day) and ibuprofen 600mg (up to three times per day) or aleve as needed for pain and swelling. Always take ibuprofen with food.      -  Injection Discharge Instructions    You may shower, however avoid swimming, tub baths or hot tubs for 24 hours following your procedure  You may have a mild to moderate increase in pain for several days following the injection.  It may take up to 14 days for the steroid medication to start working although you may feel the effect as early as a few days after the procedure.  You may use ice packs for 10-15 minutes, 3 to 4 times a day at the injection site for comfort  You may use anti-inflammatory medications (such as Ibuprofen or Aleve or Advil) or Tylenol for pain control if necessary  If you were fasting, you may resume your normal diet and medications after the procedure  If you have diabetes, check your blood sugar more frequently than usual as your blood sugar may be higher than normal for 10-14 days following a steroid injection. Contact your doctor who manages your diabetes if your blood sugar is higher than usual    If you experience any of the following, call  @ [phone] or [phone]  -Fever over 100 degree F  -Swelling, bleeding, redness, drainage, warmth at the injection site  - New or worsening pain     Please call [phone]  Ask for my team if you have any questions or concerns    Nano Sykes DO  [facility]      Thank you for choosing [facility]!    CLINIC LOCATIONS:     [place]  TRIAGE LINE: [phone]   [address] [place], [place] [address] APPOINTMENTS: [phone]   [place] [address] RADIOLOGY: [phone]   (Thursday & Friday) PHYSICAL THERAPY: [phone]    BILLING QUESTIONS: [phone]   [place] FAX: [phone]    [place] [address]    (Monday & Wednesday

## 2023-11-08 NOTE — LETTER
11/8/2023         RE: Susan Dietrich  [address]  [facility] [address]        Dear Colleague,    Thank you for referring your patient, Susan Dietrich, to the [facility] [place]. Please see a copy of my visit note below.    ASSESSMENT & PLAN    Radha was seen today for pain.    Diagnoses and all orders for this visit:    Rotator cuff syndrome, left  -     Orthopedic  Referral      63-year-old female presenting for evaluation of chronic left shoulder pain.  Since previous visits has had mild improvement in pain.  Had MRI which showed partial thickness tear of supraspinatus, degenerative joint changes, and acromial bursopathy.   At this time she is looking for next steps to help with pain relief.  We will continue to recommend conservative treatment at this time.  Last subacromial corticosteroid injection 4/2023 with some relief.  Discussed surgical and nonsurgical options.  She is agreeable to trial another subacromial bursitis injection given relief in past and bursitis seen on MRI.  See procedure note below. if no improvement in symptoms can consider glenohumeral joint injection to help with her arthritis symptoms. She will continue home exercises that she learned in physical therapy to help with strength and joint range of motion. Follow-up as needed if pain not improved in 1 month.     This issue is acute on chronic and Improving.    Nano Sykes,   [facility] [place]    SUBJECTIVE- Interim History November 8, 2023    Chief Complaint   Patient presents with     Left Shoulder - Pain       Susan Dietrich is a 63 year old female who is seen in f/u up for Rotator cuff syndrome, left. Since last visit on 04/26/23 in which they received a L subacromial bursa steroid injection patient reported that the shoulder has been feeling a little bit better.  - Now ~ 1 year from initial onset    Worsened by: Laying on her shoulder and  putting hand behind her back.  Better with: Rest  Treatments tried: rest/activity avoidance, Aleve, physical therapy (5 visits), and corticosteroid injection (most recent date: 04/26/23) that provided no relief  Associated symptoms:  Clicking    The patient is seen by themselves.  The patient is Right handed    Orthopedic/Surgical history: NO  Social History/Occupation: Works in an office      REVIEW OF SYSTEMS:  10 point ROS is negative other than symptoms noted above in HPI, Past Medical History or as stated below  Constitutional: NEGATIVE for fever, chills, change in weight  Skin: NEGATIVE for worrisome rashes, moles or lesions  GI/: NEGATIVE for bowel or bladder changes  Neuro: NEGATIVE for weakness, dizziness or paresthesias      OBJECTIVE:  LMP 10/03/2010    General: healthy, alert and in no distress  Skin: no suspicious lesions or rash.  CV: distal perfusion intact upper extremity  Resp: normal respiratory effort without conversational dyspnea   Psych: normal mood and affect  Gait: NORMAL  Neuro: Normal light sensory exam of upper extremity intact    LEFT SHOULDER  Inspection:    no swelling, bruising, discoloration, or obvious deformity or asymmetry.  Palpation:    Tender about the greater tuberosity. Remainder of bony and tendinous landmarks are nontender.    Crepitus is Present  Active Range of Motion:     Abduction 1800 / FF 1800 /  / IR L4.  Opposite arm abduction/flexion/ER full, IR L4  Painful arc  Strength:  5 out of 5 shoulder flexion.  Special Tests:    Positive: supraspinatus (empty can) painful but strength bilateral    Negative: Priest', lift-off, and Speed's      RADIOLOGY:  Final results and radiologist's interpretation, available in the [facility] health record.  Images were reviewed with the patient in the office today.  Reviewed previous imaging and agree with interpretation below:    Narrative & Impression   EXAM: MR SHOULDER LEFT W/O CONTRAST  LOCATION: [facility]  HOSPITAL  DATE: 10/1/2023     INDICATION: Chronic left shoulder pain.  COMPARISON: MRI left shoulder 11/28/2003. Left shoulder radiographs 03/22/2023.  TECHNIQUE: Unenhanced.     FINDINGS:     ROTATOR CUFF:  -Supraspinatus: Mild tendinosis. Low-grade articular-sided tear of the supraspinatus centrally extending 0.3 cm anterior to posterior (series 5 image 18). No full-thickness tear, retraction, or atrophy.  -Infraspinatus: Mild tendinosis. No tendon tear or atrophy.  -Subscapularis: Mild tendinosis. No tendon tear or atrophy.  -Teres minor: No tendon tear, tendinopathy or fatty atrophy.     CORACOACROMIAL ARCH:  -Morphology: Type II acromion. No subacromial spur. Lateral downsloping of the acromion slightly narrows the subacromial space.   -Bursa: Edema or trace fluid in the subacromial subdeltoid bursa.     ACROMIOCLAVICULAR JOINT:   -Mild degenerative arthritis of the acromioclavicular joint with mild capsular thickening. Intact coracoclavicular and coracoacromial ligaments.      LONG HEAD OF BICEPS TENDON:   -No tendinopathy or tear. No tenosynovitis or subluxation.     GLENOHUMERAL JOINT:   -Labrum: Mild degenerative change of the glenoid labrum. No displaced labral tear or paralabral cyst.   -Cartilage: Mild diffuse thinning of the articular cartilage of the glenohumeral joint with ill-defined areas of articular cartilage irregularity and fibrillation (series 5 image 19).   -Joint space: No effusion or synovitis.  -Glenohumeral ligaments and capsule: No pericapsular inflammation.     BONES:   -No fracture or concerning marrow replacing lesion.     SOFT TISSUES:   -Normal deltoid muscle bulk. Normal visualized chest wall and axilla.                                                                      IMPRESSION:     1.  Mild rotator cuff tendinosis with low-grade articular-sided partial-thickness tearing of the supraspinatus. No full-thickness rotator cuff tear.  2.  Mild degenerative arthritis of the  acromioclavicular and glenohumeral joints with degenerative changes of the glenoid labrum.  3.  Trace fluid or edema in the region of the subacromial/subdeltoid bursa suggestive of bursopathy.        Narrative & Impression   XR SHOULDER LEFT G/E 3 VIEWS   3/22/2023 8:44 AM      HISTORY: Chronic left shoulder pain; Chronic left shoulder pain  COMPARISON: None.                                                                       IMPRESSION: Normal alignment. No fracture. Minimal degenerative  arthrosis of the AC joint. There may be a tiny calcification  projecting near the lesser tuberosity, which could represent minimal  hydroxyapatite deposition.     KIM REID MD        Large Joint Injection/Arthocentesis: L subacromial bursa    Date/Time: 11/8/2023 9:48 AM    Performed by: Nano Sykes MD  Authorized by: Nano Sykes MD    Indications:  Pain  Needle Size:  25 G  Guidance: ultrasound    Approach:  Posterior  Location:  Shoulder      Site:  L subacromial bursa  Medications:  5 mL lidocaine 2 %; 40 mg triamcinolone 40 MG/ML  Outcome:  Tolerated well, no immediate complications  Procedure discussed: discussed risks, benefits, and alternatives    Consent Given by:  Patient  Timeout: timeout called immediately prior to procedure    Prep: patient was prepped and draped in usual sterile fashion     Ultrasound was used to ensure safe and accurate needle placement into bursa and injection. Ultrasound images of the procedure were permanently stored.          Greater than 30 minutes spent by me on the date of the encounter doing chart review, review of outside records, review of test results, and interpretation of tests documentation, patient visit. If procedure performed, this was separate from time with procedure.              Again, thank you for allowing me to participate in the care of your patient.        Sincerely,        Nano Sykes MD

## 2023-11-14 DIAGNOSIS — N18.30 TYPE 2 DIABETES MELLITUS WITH STAGE 3 CHRONIC KIDNEY DISEASE, WITHOUT LONG-TERM CURRENT USE OF INSULIN, UNSPECIFIED WHETHER STAGE 3A OR 3B CKD (H): ICD-10-CM

## 2023-11-14 DIAGNOSIS — E11.22 TYPE 2 DIABETES MELLITUS WITH STAGE 3 CHRONIC KIDNEY DISEASE, WITHOUT LONG-TERM CURRENT USE OF INSULIN, UNSPECIFIED WHETHER STAGE 3A OR 3B CKD (H): ICD-10-CM

## 2023-11-14 RX ORDER — GLIMEPIRIDE 4 MG/1
TABLET ORAL
Qty: 90 TABLET | Refills: 0 | Status: SHIPPED | OUTPATIENT
Start: 2023-11-14 | End: 2023-12-20

## 2023-12-03 DIAGNOSIS — N18.30 TYPE 2 DIABETES MELLITUS WITH STAGE 3 CHRONIC KIDNEY DISEASE, WITHOUT LONG-TERM CURRENT USE OF INSULIN, UNSPECIFIED WHETHER STAGE 3A OR 3B CKD (H): ICD-10-CM

## 2023-12-03 DIAGNOSIS — F33.1 MAJOR DEPRESSIVE DISORDER, RECURRENT EPISODE, MODERATE (H): ICD-10-CM

## 2023-12-03 DIAGNOSIS — I10 HTN, GOAL BELOW 140/90: ICD-10-CM

## 2023-12-03 DIAGNOSIS — E78.5 HYPERLIPIDEMIA LDL GOAL <100: ICD-10-CM

## 2023-12-03 DIAGNOSIS — E11.22 TYPE 2 DIABETES MELLITUS WITH STAGE 3 CHRONIC KIDNEY DISEASE, WITHOUT LONG-TERM CURRENT USE OF INSULIN, UNSPECIFIED WHETHER STAGE 3A OR 3B CKD (H): ICD-10-CM

## 2023-12-05 RX ORDER — PIOGLITAZONEHYDROCHLORIDE 30 MG/1
TABLET ORAL
Qty: 90 TABLET | Refills: 0 | Status: SHIPPED | OUTPATIENT
Start: 2023-12-05 | End: 2024-03-01

## 2023-12-05 RX ORDER — BUPROPION HYDROCHLORIDE 300 MG/1
TABLET ORAL
Qty: 90 TABLET | Refills: 0 | Status: SHIPPED | OUTPATIENT
Start: 2023-12-05 | End: 2024-03-25

## 2023-12-05 RX ORDER — EMPAGLIFLOZIN 25 MG/1
TABLET, FILM COATED ORAL
Qty: 90 TABLET | Refills: 0 | Status: SHIPPED | OUTPATIENT
Start: 2023-12-05 | End: 2024-03-01

## 2023-12-05 RX ORDER — LISINOPRIL 5 MG/1
TABLET ORAL
Qty: 90 TABLET | Refills: 0 | Status: SHIPPED | OUTPATIENT
Start: 2023-12-05 | End: 2024-03-01

## 2023-12-05 RX ORDER — ATORVASTATIN CALCIUM 10 MG/1
TABLET, FILM COATED ORAL
Qty: 90 TABLET | Refills: 0 | Status: SHIPPED | OUTPATIENT
Start: 2023-12-05 | End: 2024-03-01

## 2023-12-14 NOTE — COMMUNITY RESOURCES LIST (ENGLISH)
12/14/2023   [facility] [employer]  N/A  For questions about this resource list or additional care needs, please contact your primary care clinic or care manager.  Phone: [phone]   Email: N/A   Address: [address] [place] [address]   Hours: N/A        Financial Stability       Utility payment assistance  1  [facility] - [facility] Office - Energy Assistance Program Distance: 6.9 miles      Phone/Virtual   [address] [place] [address]  Language: English  Hours: Mon - Fri 6:00 AM - 6:30 PM  Fees: Free   Phone: (820) 545-1616 Email: dipika@[facility].[employer] Website: http://www.[facility]"[email or web address]     2  [facility] - Emergency Services Distance: 18.04 miles      In-Person, Phone/Virtual   [address] [place] [address]  Language: English  Hours: Mon - Fri 8:00 AM - 4:30 PM  Fees: Free   Phone: (962) 392-7212 Email: Lala@[email or web address].[employer] Healthcare[facility]a-nsn.gov Website: https://[employer]/services/community-support-services#EmergencyServices          Important Numbers & Websites       Emergency Services   911  City Services   311  Poison Control   (553) 712-7055  Suicide Prevention Lifeline   (302) 935-6961 (TALK)  Child Abuse Hotline   (866) 912-4028 (4-A-Child)  Sexual Assault Hotline   (886) 834-4161 (HOPE)  National Runaway Safeline   (715) 720-9663 (RUNAWAY)  All-Options Talkline   (178) 187-1594  Substance Abuse Referral   (993) 530-7696 (HELP)

## 2023-12-19 ASSESSMENT — PATIENT HEALTH QUESTIONNAIRE - PHQ9
SUM OF ALL RESPONSES TO PHQ QUESTIONS 1-9: 12
10. IF YOU CHECKED OFF ANY PROBLEMS, HOW DIFFICULT HAVE THESE PROBLEMS MADE IT FOR YOU TO DO YOUR WORK, TAKE CARE OF THINGS AT HOME, OR GET ALONG WITH OTHER PEOPLE: NOT DIFFICULT AT ALL
SUM OF ALL RESPONSES TO PHQ QUESTIONS 1-9: 12

## 2023-12-19 NOTE — COMMUNITY RESOURCES LIST (ENGLISH)
12/19/2023   [facility] [employer]  N/A  For questions about this resource list or additional care needs, please contact your primary care clinic or care manager.  Phone: [phone]   Email: N/A   Address: [address] [place] [address]   Hours: N/A        Financial Stability       Utility payment assistance  1  [facility] - [facility] Office - Energy Assistance Program Distance: 6.9 miles      Phone/Virtual   [address] [place] [address]  Language: English  Hours: Mon - Fri 6:00 AM - 6:30 PM  Fees: Free   Phone: (456) 595-4090 Email: dipika@[facility].[employer] Website: http://www.[facility] Memphis[facility] [employer]     2  [facility] - Emergency Services Distance: 18.04 miles      In-Person, Phone/Virtual   [address] [place] [address]  Language: English  Hours: Mon - Fri 8:00 AM - 4:30 PM  Fees: Free   Phone: (701) 692-2801 Email: [email or web address] Center[facility]-nsn.gov Website: https://[employer]/services/community-support-services#EmergencyServices          Important Numbers & Websites       Emergency Services   911  City Services   311  Poison Control   (180) 245-7500  Suicide Prevention Lifeline   (935) 525-9341 (TALK)  Child Abuse Hotline   (856) 331-1685 (4-A-Child)  Sexual Assault Hotline   (178) 503-3367 (HOPE)  National Runaway Safeline   (238) 793-7031 (RUNAWAY)  All-Options Talkline   (239) 560-2538  Substance Abuse Referral   (620) 180-3316 (HELP)

## 2023-12-20 ENCOUNTER — ANCILLARY PROCEDURE (OUTPATIENT)
Dept: GENERAL RADIOLOGY | Facility: CLINIC | Age: 63
End: 2023-12-20
Attending: FAMILY MEDICINE
Payer: COMMERCIAL

## 2023-12-20 ENCOUNTER — OFFICE VISIT (OUTPATIENT)
Dept: FAMILY MEDICINE | Facility: CLINIC | Age: 63
End: 2023-12-20
Payer: COMMERCIAL

## 2023-12-20 VITALS
HEART RATE: 86 BPM | WEIGHT: 220 LBS | DIASTOLIC BLOOD PRESSURE: 80 MMHG | RESPIRATION RATE: 12 BRPM | BODY MASS INDEX: 36.65 KG/M2 | TEMPERATURE: 97.9 F | HEIGHT: 65 IN | OXYGEN SATURATION: 97 % | SYSTOLIC BLOOD PRESSURE: 134 MMHG

## 2023-12-20 DIAGNOSIS — I73.9 PVD (PERIPHERAL VASCULAR DISEASE) (H): ICD-10-CM

## 2023-12-20 DIAGNOSIS — M70.62 TROCHANTERIC BURSITIS OF LEFT HIP: ICD-10-CM

## 2023-12-20 DIAGNOSIS — N18.30 TYPE 2 DIABETES MELLITUS WITH STAGE 3 CHRONIC KIDNEY DISEASE, WITHOUT LONG-TERM CURRENT USE OF INSULIN, UNSPECIFIED WHETHER STAGE 3A OR 3B CKD (H): Primary | ICD-10-CM

## 2023-12-20 DIAGNOSIS — E11.9 TYPE 2 DIABETES MELLITUS WITHOUT COMPLICATION, WITHOUT LONG-TERM CURRENT USE OF INSULIN (H): ICD-10-CM

## 2023-12-20 DIAGNOSIS — E11.22 TYPE 2 DIABETES MELLITUS WITH STAGE 3 CHRONIC KIDNEY DISEASE, WITHOUT LONG-TERM CURRENT USE OF INSULIN, UNSPECIFIED WHETHER STAGE 3A OR 3B CKD (H): Primary | ICD-10-CM

## 2023-12-20 DIAGNOSIS — I10 HTN, GOAL BELOW 140/90: ICD-10-CM

## 2023-12-20 DIAGNOSIS — K58.9 IRRITABLE BOWEL SYNDROME, UNSPECIFIED TYPE: ICD-10-CM

## 2023-12-20 DIAGNOSIS — E66.01 MORBID OBESITY (H): ICD-10-CM

## 2023-12-20 DIAGNOSIS — F33.1 MAJOR DEPRESSIVE DISORDER, RECURRENT EPISODE, MODERATE (H): ICD-10-CM

## 2023-12-20 LAB
ALBUMIN SERPL BCG-MCNC: 3.9 G/DL (ref 3.5–5.2)
ALP SERPL-CCNC: 80 U/L (ref 40–150)
ALT SERPL W P-5'-P-CCNC: 23 U/L (ref 0–50)
ANION GAP SERPL CALCULATED.3IONS-SCNC: 11 MMOL/L (ref 7–15)
AST SERPL W P-5'-P-CCNC: 30 U/L (ref 0–45)
BASOPHILS # BLD AUTO: 0 10E3/UL (ref 0–0.2)
BASOPHILS NFR BLD AUTO: 0 %
BILIRUB SERPL-MCNC: 0.6 MG/DL
BUN SERPL-MCNC: 20.1 MG/DL (ref 8–23)
CALCIUM SERPL-MCNC: 9.1 MG/DL (ref 8.8–10.2)
CHLORIDE SERPL-SCNC: 108 MMOL/L (ref 98–107)
CHOLEST SERPL-MCNC: 124 MG/DL
CREAT SERPL-MCNC: 0.78 MG/DL (ref 0.51–0.95)
DEPRECATED HCO3 PLAS-SCNC: 22 MMOL/L (ref 22–29)
EGFRCR SERPLBLD CKD-EPI 2021: 85 ML/MIN/1.73M2
EOSINOPHIL # BLD AUTO: 0 10E3/UL (ref 0–0.7)
EOSINOPHIL NFR BLD AUTO: 1 %
ERYTHROCYTE [DISTWIDTH] IN BLOOD BY AUTOMATED COUNT: 15.1 % (ref 10–15)
FASTING STATUS PATIENT QL REPORTED: YES
GLUCOSE SERPL-MCNC: 105 MG/DL (ref 70–99)
HBA1C MFR BLD: 7.3 % (ref 0–5.6)
HCT VFR BLD AUTO: 47.1 % (ref 35–47)
HDLC SERPL-MCNC: 60 MG/DL
HGB BLD-MCNC: 14.9 G/DL (ref 11.7–15.7)
HOLD SPECIMEN: NORMAL
HOLD SPECIMEN: NORMAL
IMM GRANULOCYTES # BLD: 0 10E3/UL
IMM GRANULOCYTES NFR BLD: 0 %
LDLC SERPL CALC-MCNC: 51 MG/DL
LYMPHOCYTES # BLD AUTO: 1.7 10E3/UL (ref 0.8–5.3)
LYMPHOCYTES NFR BLD AUTO: 24 %
MCH RBC QN AUTO: 29.7 PG (ref 26.5–33)
MCHC RBC AUTO-ENTMCNC: 31.6 G/DL (ref 31.5–36.5)
MCV RBC AUTO: 94 FL (ref 78–100)
MONOCYTES # BLD AUTO: 0.6 10E3/UL (ref 0–1.3)
MONOCYTES NFR BLD AUTO: 9 %
NEUTROPHILS # BLD AUTO: 4.7 10E3/UL (ref 1.6–8.3)
NEUTROPHILS NFR BLD AUTO: 66 %
NONHDLC SERPL-MCNC: 64 MG/DL
PLATELET # BLD AUTO: 206 10E3/UL (ref 150–450)
POTASSIUM SERPL-SCNC: 4.5 MMOL/L (ref 3.4–5.3)
PROT SERPL-MCNC: 6.9 G/DL (ref 6.4–8.3)
RBC # BLD AUTO: 5.01 10E6/UL (ref 3.8–5.2)
SODIUM SERPL-SCNC: 141 MMOL/L (ref 135–145)
TRIGL SERPL-MCNC: 67 MG/DL
TSH SERPL DL<=0.005 MIU/L-ACNC: 1.24 UIU/ML (ref 0.3–4.2)
WBC # BLD AUTO: 7.2 10E3/UL (ref 4–11)

## 2023-12-20 PROCEDURE — 80053 COMPREHEN METABOLIC PANEL: CPT | Performed by: FAMILY MEDICINE

## 2023-12-20 PROCEDURE — 85025 COMPLETE CBC W/AUTO DIFF WBC: CPT | Performed by: FAMILY MEDICINE

## 2023-12-20 PROCEDURE — 86364 TISS TRNSGLTMNASE EA IG CLAS: CPT | Performed by: FAMILY MEDICINE

## 2023-12-20 PROCEDURE — 99215 OFFICE O/P EST HI 40 MIN: CPT | Performed by: FAMILY MEDICINE

## 2023-12-20 PROCEDURE — 73502 X-RAY EXAM HIP UNI 2-3 VIEWS: CPT | Mod: TC | Performed by: RADIOLOGY

## 2023-12-20 PROCEDURE — 84443 ASSAY THYROID STIM HORMONE: CPT | Performed by: FAMILY MEDICINE

## 2023-12-20 PROCEDURE — 83036 HEMOGLOBIN GLYCOSYLATED A1C: CPT | Performed by: FAMILY MEDICINE

## 2023-12-20 PROCEDURE — 36415 COLL VENOUS BLD VENIPUNCTURE: CPT | Performed by: FAMILY MEDICINE

## 2023-12-20 PROCEDURE — 80061 LIPID PANEL: CPT | Performed by: FAMILY MEDICINE

## 2023-12-20 RX ORDER — FLUOXETINE 10 MG/1
10 CAPSULE ORAL DAILY
Qty: 90 CAPSULE | Refills: 1 | Status: SHIPPED | OUTPATIENT
Start: 2023-12-20 | End: 2024-03-26

## 2023-12-20 RX ORDER — SEMAGLUTIDE 0.68 MG/ML
1 INJECTION, SOLUTION SUBCUTANEOUS
Qty: 18 ML | Refills: 1 | Status: SHIPPED | OUTPATIENT
Start: 2023-12-20 | End: 2024-03-26

## 2023-12-20 NOTE — PATIENT INSTRUCTIONS
Stop glipepride when increase to ozempic 1mg weekly    Benefiber daily    Hip bursitis, icing daily, stretches

## 2023-12-20 NOTE — PROGRESS NOTES
Assessment & Plan     Type 2 diabetes mellitus with stage 3 chronic kidney disease, without long-term current use of insulin, unspecified whether stage 3a or 3b CKD (H)  Drop glimipride when increases ozempic  - HEMOGLOBIN A1C; Future  - HEMOGLOBIN A1C  - Hemoglobin A1c; Future  - Comprehensive metabolic panel (BMP + Alb, Alk Phos, ALT, AST, Total. Bili, TP); Future  - TSH with free T4 reflex; Future  - Comprehensive metabolic panel (BMP + Alb, Alk Phos, ALT, AST, Total. Bili, TP)  - TSH with free T4 reflex  - Semaglutide, 1 MG/DOSE, (OZEMPIC) 4 MG/3ML pen; Inject 1 mg Subcutaneous every 7 days    Major depressive disorder, recurrent episode, moderate (H)  Cont wellbutrin, adding prozac due to IBS    PVD (peripheral vascular disease) (H24)  On aspiring daily    Morbid obesity (H)  Working on weight loss, with ozempic, BMI>35 with diabetes    Type 2 diabetes mellitus without complication, without long-term current use of insulin (H)  Increase ozempic to 1mg  - semaglutide (OZEMPIC, 0.25 OR 0.5 MG/DOSE,) 2 MG/3ML pen; Inject 1 mg Subcutaneous every 7 days    Irritable bowel syndrome, unspecified type  Add prozac and benefiber, follow-up in 3 mo  - FLUoxetine (PROZAC) 10 MG capsule; Take 1 capsule (10 mg) by mouth daily  - CBC with platelets and differential; Future  - Comprehensive metabolic panel (BMP + Alb, Alk Phos, ALT, AST, Total. Bili, TP); Future  - TSH with free T4 reflex; Future  - Tissue transglutaminase ethel IgA and IgG; Future  - CBC with platelets and differential  - Comprehensive metabolic panel (BMP + Alb, Alk Phos, ALT, AST, Total. Bili, TP)  - TSH with free T4 reflex  - Tissue transglutaminase ethel IgA and IgG    Trochanteric bursitis of left hip  Will check x-ray. Recommend ice, PT, pt declined PT  - XR Hip Left 2-3 Views; Future    HTN, goal below 140/90  Controlled, cont same  - Lipid panel reflex to direct LDL Fasting    Ordering of each unique test  Prescription drug management  40 minutes spent  by me on the date of the encounter doing chart review, history and exam, documentation and further activities per the note       Regular exercise    Concha Lucas MD  [facility]    Subjective   Radha is a 63 year old, presenting for the following health issues:  Diabetes, Recheck Medication (Dry tongue, bruise easier, irritable, fatigue-from Ozempic? Diarrhea/constipation-having bouts again, does not feel good 3/7 days during the week-Have to be on 4 different medications for the diabetes?? Keto ACV gummies for weight loss?), Musculoskeletal Problem (L hip sx worse, hard to even sleep with the pain), Fatigue (Long covid? Side effects from medication?), and Consult For (Go to an Endo for the diabetes?)        12/20/2023     9:01 AM   Additional Questions   Roomed by Jacki NOVAK       History of Present Illness       Diabetes:   She presents for follow up of diabetes.  She is checking home blood glucose one time daily.   She checks blood glucose before meals.  Blood glucose is sometimes over 200 and never under 70. She is aware of hypoglycemia symptoms including shakiness, dizziness, weakness and blurred vision.   She is concerned about other.   She is having excessive thirst and weight gain.            She eats 0-1 servings of fruits and vegetables daily.She consumes 0 sweetened beverage(s) daily.She exercises with enough effort to increase her heart rate 9 or less minutes per day.  She exercises with enough effort to increase her heart rate 3 or less days per week. She is missing 1 dose(s) of medications per week.  She is not taking prescribed medications regularly due to other.     Lab Results   Component Value Date    A1C 7.3 12/20/2023    A1C 7.9 09/20/2023    A1C 7.4 02/27/2023    A1C 6.8 10/12/2022    A1C 6.8 04/14/2022    A1C 8.5 07/01/2021    A1C 8.9 04/28/2021    A1C 7.6 12/07/2020    A1C 8.0 06/29/2020    A1C 9.4 01/27/2020     Not feeling well, used to have a lot of IBS,  "and this may have come back, with constipation and starts to use ducolax and then ends of diarrhea.  Used to take fiber gummies, but stopped that. Hx of metamucil in the past, but has a hard time drinking it every morning  She did use the metamucil daily for about 10 yrs, but got better for awhile.   Ozempic seems to have caused this issue again, bloating and constipation,    Taking wellbutrin and knows emotions can play a role. Feeling very tired, wondering about long covid, has had covid 3 times, no coughing or SOB, nose runs when she starts to eat. But can smell normally    Missed her last dose and asking about timing.    Morbid obesity, losing weight will help sugars, on ozempic    Left outer hip hurts, mostly to lay on that side, but also walking at times, feels like it will pop out of joint              Review of Systems   Constitutional, HEENT, cardiovascular, pulmonary, GI, , musculoskeletal, neuro, skin, endocrine and psych systems are negative, except as otherwise noted.      Objective    /80 (BP Location: Right arm, Patient Position: Chair, Cuff Size: Adult Large)   Pulse 86   Temp 97.9  F (36.6  C) (Oral)   Resp 12   Ht 1.638 m (5' 4.5\")   Wt 99.8 kg (220 lb)   LMP 10/03/2010   SpO2 97%   BMI 37.18 kg/m    Body mass index is 37.18 kg/m .  Physical Exam   GENERAL: healthy, alert and no distress  EYES: Eyes grossly normal to inspection, PERRL and conjunctivae and sclerae normal  HENT: ear canals and TM's normal, nose and mouth without ulcers or lesions  NECK: no adenopathy, no asymmetry, masses, or scars and thyroid normal to palpation  RESP: lungs clear to auscultation - no rales, rhonchi or wheezes  CV: regular rate and rhythm, normal S1 S2, no S3 or S4, no murmur, click or rub, no peripheral edema and peripheral pulses strong  ABDOMEN: soft, nontender, no hepatosplenomegaly, no masses and bowel sounds normal  MS: no gross musculoskeletal defects noted, no edema  SKIN: no suspicious " lesions or rashes  NEURO: Normal strength and tone, mentation intact and speech normal  PSYCH: mentation appears normal, affect normal/bright    Hemoglobin A1C   Date Value Ref Range Status   12/20/2023 7.3 (H) 0.0 - 5.6 % Final     Comment:     Normal <5.7%   Prediabetes 5.7-6.4%    Diabetes 6.5% or higher     Note: Adopted from ADA consensus guidelines.   07/01/2021 8.5 (H) 0 - 5.6 % Final     Comment:     Normal <5.7% Prediabetes 5.7-6.4%  Diabetes 6.5% or higher - adopted from ADA   consensus guidelines.  Reviewed: OK with previous

## 2023-12-21 LAB
TTG IGA SER-ACNC: 1.3 U/ML
TTG IGG SER-ACNC: <0.6 U/ML

## 2023-12-23 ENCOUNTER — MYC MEDICAL ADVICE (OUTPATIENT)
Dept: FAMILY MEDICINE | Facility: CLINIC | Age: 63
End: 2023-12-23
Payer: COMMERCIAL

## 2023-12-23 DIAGNOSIS — E78.5 HYPERLIPIDEMIA LDL GOAL <100: ICD-10-CM

## 2023-12-23 DIAGNOSIS — M54.50 CHRONIC LEFT-SIDED LOW BACK PAIN WITHOUT SCIATICA: Primary | ICD-10-CM

## 2023-12-23 DIAGNOSIS — G89.29 CHRONIC LEFT-SIDED LOW BACK PAIN WITHOUT SCIATICA: Primary | ICD-10-CM

## 2023-12-28 NOTE — TELEPHONE ENCOUNTER
This med is approved for diarrhea after gallbladder removal or for high cholesterol. It is not actually approved for IBS. Is this what she was using it for?  It was last prescribed by Claire Ngo in 2009, as far as I can tell.  Her x-ray did show a cholesterol build up in her arteries,  so it might be a good idea to discuss her cholesterol anyway.      [facility], MD

## 2024-03-01 DIAGNOSIS — E11.22 TYPE 2 DIABETES MELLITUS WITH STAGE 3 CHRONIC KIDNEY DISEASE, WITHOUT LONG-TERM CURRENT USE OF INSULIN, UNSPECIFIED WHETHER STAGE 3A OR 3B CKD (H): ICD-10-CM

## 2024-03-01 DIAGNOSIS — N18.30 TYPE 2 DIABETES MELLITUS WITH STAGE 3 CHRONIC KIDNEY DISEASE, WITHOUT LONG-TERM CURRENT USE OF INSULIN, UNSPECIFIED WHETHER STAGE 3A OR 3B CKD (H): ICD-10-CM

## 2024-03-01 DIAGNOSIS — I10 HTN, GOAL BELOW 140/90: ICD-10-CM

## 2024-03-01 DIAGNOSIS — E78.5 HYPERLIPIDEMIA LDL GOAL <100: ICD-10-CM

## 2024-03-01 RX ORDER — LISINOPRIL 5 MG/1
TABLET ORAL
Qty: 90 TABLET | Refills: 0 | Status: SHIPPED | OUTPATIENT
Start: 2024-03-01 | End: 2024-06-21

## 2024-03-01 RX ORDER — PIOGLITAZONEHYDROCHLORIDE 30 MG/1
TABLET ORAL
Qty: 90 TABLET | Refills: 0 | Status: SHIPPED | OUTPATIENT
Start: 2024-03-01 | End: 2024-03-26

## 2024-03-01 RX ORDER — EMPAGLIFLOZIN 25 MG/1
TABLET, FILM COATED ORAL
Qty: 90 TABLET | Refills: 0 | Status: SHIPPED | OUTPATIENT
Start: 2024-03-01 | End: 2024-03-26

## 2024-03-01 RX ORDER — ATORVASTATIN CALCIUM 10 MG/1
TABLET, FILM COATED ORAL
Qty: 90 TABLET | Refills: 0 | Status: SHIPPED | OUTPATIENT
Start: 2024-03-01 | End: 2024-06-15

## 2024-03-11 ENCOUNTER — E-VISIT (OUTPATIENT)
Dept: URGENT CARE | Facility: CLINIC | Age: 64
End: 2024-03-11
Payer: COMMERCIAL

## 2024-03-11 ENCOUNTER — VIRTUAL VISIT (OUTPATIENT)
Dept: FAMILY MEDICINE | Facility: CLINIC | Age: 64
End: 2024-03-11
Payer: COMMERCIAL

## 2024-03-11 ENCOUNTER — MYC MEDICAL ADVICE (OUTPATIENT)
Dept: FAMILY MEDICINE | Facility: CLINIC | Age: 64
End: 2024-03-11
Payer: COMMERCIAL

## 2024-03-11 DIAGNOSIS — B30.9 VIRAL CONJUNCTIVITIS: Primary | ICD-10-CM

## 2024-03-11 PROCEDURE — 99207 PR NON-BILLABLE SERV PER CHARTING: CPT | Performed by: PREVENTIVE MEDICINE

## 2024-03-11 PROCEDURE — 99213 OFFICE O/P EST LOW 20 MIN: CPT | Mod: 95 | Performed by: FAMILY MEDICINE

## 2024-03-11 RX ORDER — POLYMYXIN B SULFATE AND TRIMETHOPRIM 1; 10000 MG/ML; [USP'U]/ML
SOLUTION OPHTHALMIC
Qty: 10 ML | Refills: 0 | Status: SHIPPED | OUTPATIENT
Start: 2024-03-11 | End: 2024-03-18

## 2024-03-11 RX ORDER — KETOTIFEN FUMARATE 0.35 MG/ML
SOLUTION/ DROPS OPHTHALMIC
Qty: 5 ML | Refills: 0 | Status: SHIPPED | OUTPATIENT
Start: 2024-03-11

## 2024-03-11 ASSESSMENT — PATIENT HEALTH QUESTIONNAIRE - PHQ9
SUM OF ALL RESPONSES TO PHQ QUESTIONS 1-9: 8
SUM OF ALL RESPONSES TO PHQ QUESTIONS 1-9: 8
10. IF YOU CHECKED OFF ANY PROBLEMS, HOW DIFFICULT HAVE THESE PROBLEMS MADE IT FOR YOU TO DO YOUR WORK, TAKE CARE OF THINGS AT HOME, OR GET ALONG WITH OTHER PEOPLE: SOMEWHAT DIFFICULT

## 2024-03-11 NOTE — PATIENT INSTRUCTIONS
Thank you for choosing us for your care. I have placed an order for a prescription so that you can start treatment. View your full visit summary for details by clicking on the link below. Your pharmacist will able to address any questions you may have about the medication.     If you re not feeling better within 2-3 days, please schedule an appointment.  You can schedule an appointment right here in [facility], or call [phone]  If the visit is for the same symptoms as your eVisit, we ll refund the cost of your eVisit if seen within seven days.    Pinkeye: Care Instructions  Overview     Pinkeye is redness and swelling of the eye surface and the conjunctiva (the lining of the eyelid and the covering of the white part of the eye). Pinkeye is also called conjunctivitis. Pinkeye is often caused by infection with bacteria or a virus. Dry air, allergies, smoke, and chemicals are other common causes.  Pinkeye often gets better on its own in 7 to 10 days. Antibiotics only help if the pinkeye is caused by bacteria. Pinkeye caused by infection spreads easily. If an allergy or chemical is causing pinkeye, it will not go away unless you can avoid whatever is causing it.  Follow-up care is a key part of your treatment and safety. Be sure to make and go to all appointments, and call your doctor if you are having problems. It's also a good idea to know your test results and keep a list of the medicines you take.  How can you care for yourself at home?  Wash your hands often. Always wash them before and after you treat pinkeye or touch your eyes or face.  Use moist cotton or a clean, wet cloth to remove crust. Wipe from the inside corner of the eye to the outside. Use a clean part of the cloth for each wipe.  Put cold or warm wet cloths on your eye a few times a day if the eye hurts.  Do not wear contact lenses or eye makeup until the pinkeye is gone. Throw away any eye makeup you were using when you got pinkeye. Clean your  "contacts and storage case. If you wear disposable contacts, use a new pair when your eye has cleared and it is safe to wear contacts again.  If the doctor gave you antibiotic ointment or eyedrops, use them as directed. Use the medicine for as long as instructed, even if your eye starts looking better soon. Keep the bottle tip clean, and do not let it touch the eye area.  To put in eyedrops or ointment:  Tilt your head back, and pull your lower eyelid down with one finger.  Drop or squirt the medicine inside the lower lid.  Close your eye for 30 to 60 seconds to let the drops or ointment move around.  Do not touch the ointment or dropper tip to your eyelashes or any other surface.  Do not share towels, pillows, or washcloths while you have pinkeye.  When should you call for help?   Call your doctor now or seek immediate medical care if:    You have pain in your eye, not just irritation on the surface.     You have a change in vision or loss of vision.     You have an increase in discharge from the eye.     Your eye has not started to improve or begins to get worse within 48 hours after you start using antibiotics.     Pinkeye lasts longer than 7 days.   Watch closely for changes in your health, and be sure to contact your doctor if you have any problems.  Where can you learn more?  Go to https://www.[employer].net/patiented  Enter Y392 in the search box to learn more about \"Pinkeye: Care Instructions.\"  Current as of: June 6, 2023               Content Version: 13.8    7118-9185 [employer].   Care instructions adapted under license by your healthcare professional. If you have questions about a medical condition or this instruction, always ask your healthcare professional. [employer] disclaims any warranty or liability for your use of this information.      "

## 2024-03-11 NOTE — PATIENT INSTRUCTIONS
Thank you for choosing us for your care. I have placed an order for a prescription so that you can start treatment. View your full visit summary for details by clicking on the link below. Your pharmacist will able to address any questions you may have about the medication.     If you re not feeling better within 2-3 days, please schedule an appointment.  You can schedule an appointment right here in [facility], or call [phone]  If the visit is for the same symptoms as your eVisit, we ll refund the cost of your eVisit if seen within seven days.

## 2024-03-11 NOTE — PROGRESS NOTES
"    Instructions Relayed to Patient by Virtual Roomer:     Patient is active on [place] Emergency Registry:   Relayed following to patient: \"It looks like you are active on [place] Emergency Registry, are you able to join the visit this way? If not, do you need us to send you a link now or would you like your provider to send a link via text or email when they are ready to initiate the visit?\"    Reminded patient to ensure they were logged on to virtual visit by arrival time listed. Documented in appointment notes if patient had flexibility to initiate visit sooner than arrival time. If pediatric virtual visit, ensured pediatric patient along with parent/guardian will be present for video visit.     Patient offered the website [email or web address] and/or phone number to [place] Emergency Registry Help line: [phone]    Radha is a 63 year old who is being evaluated via a billable video visit.      How would you like to obtain your AVS? [employer]  If the video visit is dropped, the invitation should be resent by: Text to cell phone: [phone]  Will anyone else be joining your video visit? No          Assessment & Plan     Viral conjunctivitis  Discussed viral verse bacterial - antibiotic eye drop for corneal protection.  Follow up in 1 - 3 days if not resolved. Discussed hand hygiene.  - polymixin b-trimethoprim (POLYTRIM) 83131-9.1 UNIT/ML-% ophthalmic solution; 1 drop in affected eye(s) every 6 hours while awake for 5 days until resolved - max 6 doses per day            BMI  Estimated body mass index is 37.18 kg/m  as calculated from the following:    Height as of 12/20/23: 1.638 m (5' 4.5\").    Weight as of 12/20/23: 99.8 kg (220 lb).   Weight management plan: Discussed healthy diet and exercise guidelines          Subjective   Radha is a 63 year old, presenting for the following health issues:  Conjunctivitis (Both eyes)      3/11/2024     9:49 AM   Additional Questions   Roomed by Emily SMITH   Accompanied by Self         3/11/2024     9:49 AM "   Patient Reported Additional Medications   Patient reports taking the following new medications None     Conjunctivitis    History of Present Illness       Reason for visit:  Pink eye  Symptom onset:  3-7 days ago  Symptoms include:  Swollen eyes, discharge, sore throat, itchy, red eyes  Symptom intensity:  Moderate  Symptom progression:  Improving  Had these symptoms before:  Yes  Has tried/received treatment for these symptoms:  Yes  Previous treatment was successful:  Yes  Prior treatment description:  Eye drops  What makes it worse:  No  What makes it better:  Hot wash rag    She eats 0-1 servings of fruits and vegetables daily.She consumes 1 sweetened beverage(s) daily.She exercises with enough effort to increase her heart rate 10 to 19 minutes per day.  She exercises with enough effort to increase her heart rate 3 or less days per week.   She is taking medications regularly.     Bilateral eye redness for 4 days.  Started with sore throat and coworker with same. Some continue nasal congestions          Objective           Vitals:  No vitals were obtained today due to virtual visit.    Physical Exam   GENERAL: alert and no distress  EYES: PERRL, EOMI, and conjunctiva/corneas- conjunctival injection OU  RESP: No audible wheeze, cough, or visible cyanosis.    SKIN: Visible skin clear. No significant rash, abnormal pigmentation or lesions.  NEURO: Cranial nerves grossly intact.  Mentation and speech appropriate for age.  PSYCH: Appropriate affect, tone, and pace of words          Video-Visit Details    Type of service:  Video Visit     Originating Location (pt. Location): Other work    Distant Location (provider location):  Off-site  Platform used for Video Visit: Sonia  Signed Electronically by: Cher Zavaleta MD

## 2024-03-24 DIAGNOSIS — F33.1 MAJOR DEPRESSIVE DISORDER, RECURRENT EPISODE, MODERATE (H): ICD-10-CM

## 2024-03-25 RX ORDER — BUPROPION HYDROCHLORIDE 300 MG/1
TABLET ORAL
Qty: 90 TABLET | Refills: 0 | Status: SHIPPED | OUTPATIENT
Start: 2024-03-25 | End: 2024-06-15

## 2024-03-25 ASSESSMENT — PATIENT HEALTH QUESTIONNAIRE - PHQ9: SUM OF ALL RESPONSES TO PHQ QUESTIONS 1-9: 7

## 2024-03-26 ENCOUNTER — MYC MEDICAL ADVICE (OUTPATIENT)
Dept: FAMILY MEDICINE | Facility: CLINIC | Age: 64
End: 2024-03-26

## 2024-03-26 ENCOUNTER — VIRTUAL VISIT (OUTPATIENT)
Dept: FAMILY MEDICINE | Facility: CLINIC | Age: 64
End: 2024-03-26
Payer: COMMERCIAL

## 2024-03-26 DIAGNOSIS — E11.22 TYPE 2 DIABETES MELLITUS WITH STAGE 3 CHRONIC KIDNEY DISEASE, WITHOUT LONG-TERM CURRENT USE OF INSULIN, UNSPECIFIED WHETHER STAGE 3A OR 3B CKD (H): ICD-10-CM

## 2024-03-26 DIAGNOSIS — N18.30 TYPE 2 DIABETES MELLITUS WITH STAGE 3 CHRONIC KIDNEY DISEASE, WITHOUT LONG-TERM CURRENT USE OF INSULIN, UNSPECIFIED WHETHER STAGE 3A OR 3B CKD (H): ICD-10-CM

## 2024-03-26 DIAGNOSIS — K58.9 IRRITABLE BOWEL SYNDROME, UNSPECIFIED TYPE: ICD-10-CM

## 2024-03-26 PROCEDURE — 99214 OFFICE O/P EST MOD 30 MIN: CPT | Mod: 95 | Performed by: FAMILY MEDICINE

## 2024-03-26 RX ORDER — FLUOXETINE 10 MG/1
10 CAPSULE ORAL DAILY
Qty: 90 CAPSULE | Refills: 1 | Status: SHIPPED | OUTPATIENT
Start: 2024-03-26

## 2024-03-26 RX ORDER — PIOGLITAZONEHYDROCHLORIDE 30 MG/1
30 TABLET ORAL DAILY
Qty: 90 TABLET | Refills: 1 | Status: SHIPPED | OUTPATIENT
Start: 2024-03-26

## 2024-03-26 ASSESSMENT — PATIENT HEALTH QUESTIONNAIRE - PHQ9
SUM OF ALL RESPONSES TO PHQ QUESTIONS 1-9: 7
10. IF YOU CHECKED OFF ANY PROBLEMS, HOW DIFFICULT HAVE THESE PROBLEMS MADE IT FOR YOU TO DO YOUR WORK, TAKE CARE OF THINGS AT HOME, OR GET ALONG WITH OTHER PEOPLE: NOT DIFFICULT AT ALL

## 2024-03-26 NOTE — PROGRESS NOTES
Radha is a 63 year old who is being evaluated via a billable video visit.    How would you like to obtain your AVS? MyChart  If the video visit is dropped, the invitation should be resent by: Send to e-mail at: [email or web address]  Will anyone else be joining your video visit? No      Assessment & Plan     Type 2 diabetes mellitus with stage 3 chronic kidney disease, without long-term current use of insulin, unspecified whether stage 3a or 3b CKD (H)  Due for labs, if improving, will stop jardiance  - Hemoglobin A1c; Future  - Comprehensive metabolic panel (BMP + Alb, Alk Phos, ALT, AST, Total. Bili, TP); Future  - CBC with platelets; Future  - Lipid panel reflex to direct LDL Fasting; Future  - TSH with free T4 reflex; Future  - Semaglutide, 1 MG/DOSE, (OZEMPIC) 4 MG/3ML pen; Inject 1 mg Subcutaneous every 7 days  - pioglitazone (ACTOS) 30 MG tablet; Take 1 tablet (30 mg) by mouth daily  - empagliflozin (JARDIANCE) 25 MG TABS tablet; Take 1 tablet (25 mg) by mouth daily  Cont ozempic, will not go up on dose for now, as having GI side effects     Irritable bowel syndrome, unspecified type  Cont same, doing well on low dose  - FLUoxetine (PROZAC) 10 MG capsule; Take 1 capsule (10 mg) by mouth daily    Ordering of each unique test  Prescription drug management  35 minutes spent by me on the date of the encounter doing chart review, history and exam, documentation and further activities per the note        Work on weight loss  Regular exercise    Subjective   Radha is a 63 year old, presenting for the following health issues:  Follow Up and Diabetes        3/26/2024     9:54 AM   Additional Questions   Roomed by Farzad DING   Accompanied by no one         3/26/2024     9:54 AM   Patient Reported Additional Medications   Patient reports taking the following new medications none     Via the Health Maintenance questionnaire, the patient has reported the following services have been completed -Eye Exam, this  information has been sent to the abstraction team.  History of Present Illness       Diabetes:   She presents for follow up of diabetes.  She is checking home blood glucose one time daily.   She checks blood glucose before meals.  Blood glucose is never over 200 and never under 70. She is aware of hypoglycemia symptoms including shakiness, dizziness and blurred vision.   She is concerned about other.   She is having excessive thirst.  The patient has had a diabetic eye exam in the last 12 months. Eye exam performed on April 2023. Location of last eye exam [facility] eye [facility].        She eats 0-1 servings of fruits and vegetables daily.She consumes 1 sweetened beverage(s) daily.She exercises with enough effort to increase her heart rate 9 or less minutes per day.  She exercises with enough effort to increase her heart rate 3 or less days per week.   She is taking medications regularly.       Diabetes Follow-up    How often are you checking your blood sugar? One time daily  What time of day are you checking your blood sugars (select all that apply)?  Before meals  Have you had any blood sugars above 200?  No  Have you had any blood sugars below 70?  No  What symptoms do you notice when your blood sugar is low?  Shaky, Dizzy, and Blurred vision  What concerns do you have today about your diabetes? None   Do you have any of these symptoms? (Select all that apply)  No numbness or tingling in feet.  No redness, sores or blisters on feet.  No complaints of excessive thirst.  No reports of blurry vision.  No significant changes to weight.  Sugars 100-125 in the morning, stopped the glimeride, stopped it due to weight gain and now on ozempic.  Lab Results   Component Value Date    A1C 7.3 12/20/2023    A1C 7.9 09/20/2023    A1C 7.4 02/27/2023    A1C 6.8 10/12/2022    A1C 6.8 04/14/2022    A1C 8.5 07/01/2021    A1C 8.9 04/28/2021    A1C 7.6 12/07/2020    A1C 8.0 06/29/2020    A1C 9.4 01/27/2020     On the ozempic now and  "working ok. Some issues with the GI on this med. The one side effect, she thinks the weight is down.   No thyroid issues.  Jardiance I very expense, 1200$, due to her deductable of 2400$  After eating does have a stomach ache, and it is the ozempic and feels full  BP Readings from Last 2 Encounters:   12/20/23 134/80   11/08/23 117/74     Hemoglobin A1C (%)   Date Value   12/20/2023 7.3 (H)   09/20/2023 7.9 (H)   07/01/2021 8.5 (H)   04/28/2021 8.9 (H)     LDL Cholesterol Calculated (mg/dL)   Date Value   12/20/2023 51   10/12/2022 49   07/01/2021 56   06/29/2020 37     Depression-doing well on prozac, due for refill.    Working with someone with pink eye, got treated and now improved. The sore throat resolved, so had some viral issue.    Due for RSV shot and the last covid vaccine started her shoulder problem and finally much better after a few steroid shots, not wanting the covid booster.    Eye exam is pending, next month, they will send me the results.        How many servings of fruits and vegetables do you eat daily?  0-1  On average, how many sweetened beverages do you drink each day (Examples: soda, juice, sweet tea, etc.  Do NOT count diet or artificially sweetened beverages)?   1  How many days per week do you exercise enough to make your heart beat faster? 3 or less  How many minutes a day do you exercise enough to make your heart beat faster? 9 or less  How many days per week do you miss taking your medication? 0            Review of Systems  CONSTITUTIONAL: NEGATIVE for fever, chills, change in weight  ENT/MOUTH: NEGATIVE for ear, mouth and throat problems  RESP: NEGATIVE for significant cough or SOB  CV: NEGATIVE for chest pain, palpitations or peripheral edema      Objective    Vitals - Patient Reported  Weight (Patient Reported): 99.8 kg (220 lb)  Height (Patient Reported): 163.8 cm (5' 4.5\")  BMI (Based on Pt Reported Ht/Wt): 37.18      Vitals:  No vitals were obtained today due to virtual " visit.    Physical Exam   GENERAL: alert and no distress  EYES: Eyes grossly normal to inspection.  No discharge or erythema, or obvious scleral/conjunctival abnormalities.  RESP: No audible wheeze, cough, or visible cyanosis.    SKIN: Visible skin clear. No significant rash, abnormal pigmentation or lesions.  NEURO: Cranial nerves grossly intact.  Mentation and speech appropriate for age.  PSYCH: Appropriate affect, tone, and pace of words          Video-Visit Details    Type of service:  Video Visit   Originating Location (pt. Location): Home    Distant Location (provider location):  Off-site  Platform used for Video Visit: Sonia  Signed Electronically by: Concha Lucas MD

## 2024-03-27 ENCOUNTER — TELEPHONE (OUTPATIENT)
Dept: FAMILY MEDICINE | Facility: CLINIC | Age: 64
End: 2024-03-27

## 2024-03-27 ENCOUNTER — ALLIED HEALTH/NURSE VISIT (OUTPATIENT)
Dept: FAMILY MEDICINE | Facility: CLINIC | Age: 64
End: 2024-03-27
Payer: COMMERCIAL

## 2024-03-27 ENCOUNTER — LAB (OUTPATIENT)
Dept: LAB | Facility: CLINIC | Age: 64
End: 2024-03-27
Payer: COMMERCIAL

## 2024-03-27 VITALS
SYSTOLIC BLOOD PRESSURE: 126 MMHG | HEART RATE: 97 BPM | BODY MASS INDEX: 33.78 KG/M2 | HEIGHT: 65 IN | WEIGHT: 202.74 LBS | OXYGEN SATURATION: 97 % | DIASTOLIC BLOOD PRESSURE: 77 MMHG

## 2024-03-27 DIAGNOSIS — E11.22 TYPE 2 DIABETES MELLITUS WITH STAGE 3 CHRONIC KIDNEY DISEASE, WITHOUT LONG-TERM CURRENT USE OF INSULIN, UNSPECIFIED WHETHER STAGE 3A OR 3B CKD (H): ICD-10-CM

## 2024-03-27 DIAGNOSIS — N18.30 TYPE 2 DIABETES MELLITUS WITH STAGE 3 CHRONIC KIDNEY DISEASE, WITHOUT LONG-TERM CURRENT USE OF INSULIN, UNSPECIFIED WHETHER STAGE 3A OR 3B CKD (H): ICD-10-CM

## 2024-03-27 DIAGNOSIS — Z01.30 BLOOD PRESSURE CHECK: Primary | ICD-10-CM

## 2024-03-27 LAB
ALBUMIN SERPL BCG-MCNC: 4.4 G/DL (ref 3.5–5.2)
ALP SERPL-CCNC: 88 U/L (ref 40–150)
ALT SERPL W P-5'-P-CCNC: 20 U/L (ref 0–50)
ANION GAP SERPL CALCULATED.3IONS-SCNC: 12 MMOL/L (ref 7–15)
AST SERPL W P-5'-P-CCNC: 22 U/L (ref 0–45)
BILIRUB SERPL-MCNC: 0.7 MG/DL
BUN SERPL-MCNC: 16.5 MG/DL (ref 8–23)
CALCIUM SERPL-MCNC: 9.8 MG/DL (ref 8.8–10.2)
CHLORIDE SERPL-SCNC: 105 MMOL/L (ref 98–107)
CHOLEST SERPL-MCNC: 141 MG/DL
CREAT SERPL-MCNC: 0.83 MG/DL (ref 0.51–0.95)
DEPRECATED HCO3 PLAS-SCNC: 24 MMOL/L (ref 22–29)
EGFRCR SERPLBLD CKD-EPI 2021: 79 ML/MIN/1.73M2
ERYTHROCYTE [DISTWIDTH] IN BLOOD BY AUTOMATED COUNT: 15.6 % (ref 10–15)
FASTING STATUS PATIENT QL REPORTED: YES
GLUCOSE SERPL-MCNC: 129 MG/DL (ref 70–99)
HBA1C MFR BLD: 7.8 % (ref 0–5.6)
HCT VFR BLD AUTO: 49.2 % (ref 35–47)
HDLC SERPL-MCNC: 66 MG/DL
HGB BLD-MCNC: 15.7 G/DL (ref 11.7–15.7)
LDLC SERPL CALC-MCNC: 60 MG/DL
MCH RBC QN AUTO: 29.6 PG (ref 26.5–33)
MCHC RBC AUTO-ENTMCNC: 31.9 G/DL (ref 31.5–36.5)
MCV RBC AUTO: 93 FL (ref 78–100)
NONHDLC SERPL-MCNC: 75 MG/DL
PLATELET # BLD AUTO: 226 10E3/UL (ref 150–450)
POTASSIUM SERPL-SCNC: 4.2 MMOL/L (ref 3.4–5.3)
PROT SERPL-MCNC: 7.5 G/DL (ref 6.4–8.3)
RBC # BLD AUTO: 5.3 10E6/UL (ref 3.8–5.2)
SODIUM SERPL-SCNC: 141 MMOL/L (ref 135–145)
TRIGL SERPL-MCNC: 73 MG/DL
TSH SERPL DL<=0.005 MIU/L-ACNC: 1.32 UIU/ML (ref 0.3–4.2)
WBC # BLD AUTO: 8.1 10E3/UL (ref 4–11)

## 2024-03-27 PROCEDURE — 80053 COMPREHEN METABOLIC PANEL: CPT

## 2024-03-27 PROCEDURE — 84443 ASSAY THYROID STIM HORMONE: CPT

## 2024-03-27 PROCEDURE — 99207 PR NO CHARGE NURSE ONLY: CPT

## 2024-03-27 PROCEDURE — 36415 COLL VENOUS BLD VENIPUNCTURE: CPT

## 2024-03-27 PROCEDURE — 80061 LIPID PANEL: CPT

## 2024-03-27 PROCEDURE — 83036 HEMOGLOBIN GLYCOSYLATED A1C: CPT

## 2024-03-27 PROCEDURE — 85027 COMPLETE CBC AUTOMATED: CPT

## 2024-03-27 NOTE — PROGRESS NOTES
"Susan Dietrich is a 63 year old patient who comes in today for a Blood Pressure check.  Initial BP:  /77 (BP Location: Right arm, Patient Position: Sitting, Cuff Size: Adult Large)   Pulse 97   Ht 1.651 m (5' 5\")   Wt 92 kg (202 lb 11.8 oz)   LMP 10/03/2010   SpO2 97%   BMI 33.74 kg/m       97  Disposition: follow-up as previously indicated by provider   "

## 2024-04-03 NOTE — TELEPHONE ENCOUNTER
Her hip x-ray    12/2023  Narrative & Impression   LEFT HIP TWO TO THREE VIEWS 12/20/2023 10:39 AM      INDICATION: Left hip pain. Trochanteric bursitis.      COMPARISON: None.                                                                      IMPRESSION:  1.  Normal left hip joint spacing and alignment.  2.  No fracture.  3.  Degenerative changes in the lower lumbar spine.  4.  Atherosclerotic calcification.     The atherosclerotic calcification is cholesterol build up with contains calcium.

## 2024-04-03 NOTE — TELEPHONE ENCOUNTER
See visit discussing, sent [employer] message informing below  Anabella Louis, RN, BSN  [facility]

## 2024-04-23 DIAGNOSIS — N18.30 TYPE 2 DIABETES MELLITUS WITH STAGE 3 CHRONIC KIDNEY DISEASE, WITHOUT LONG-TERM CURRENT USE OF INSULIN, UNSPECIFIED WHETHER STAGE 3A OR 3B CKD (H): ICD-10-CM

## 2024-04-23 DIAGNOSIS — E11.22 TYPE 2 DIABETES MELLITUS WITH STAGE 3 CHRONIC KIDNEY DISEASE, WITHOUT LONG-TERM CURRENT USE OF INSULIN, UNSPECIFIED WHETHER STAGE 3A OR 3B CKD (H): ICD-10-CM

## 2024-04-23 RX ORDER — BLOOD SUGAR DIAGNOSTIC
STRIP MISCELLANEOUS
Qty: 50 STRIP | Refills: 10 | Status: SHIPPED | OUTPATIENT
Start: 2024-04-23

## 2024-04-30 ENCOUNTER — TRANSFERRED RECORDS (OUTPATIENT)
Dept: HEALTH INFORMATION MANAGEMENT | Facility: CLINIC | Age: 64
End: 2024-04-30
Payer: COMMERCIAL

## 2024-04-30 LAB — RETINOPATHY: NEGATIVE

## 2024-06-08 DIAGNOSIS — N18.30 TYPE 2 DIABETES MELLITUS WITH STAGE 3 CHRONIC KIDNEY DISEASE, WITHOUT LONG-TERM CURRENT USE OF INSULIN, UNSPECIFIED WHETHER STAGE 3A OR 3B CKD (H): ICD-10-CM

## 2024-06-08 DIAGNOSIS — E11.22 TYPE 2 DIABETES MELLITUS WITH STAGE 3 CHRONIC KIDNEY DISEASE, WITHOUT LONG-TERM CURRENT USE OF INSULIN, UNSPECIFIED WHETHER STAGE 3A OR 3B CKD (H): ICD-10-CM

## 2024-06-12 ENCOUNTER — MYC MEDICAL ADVICE (OUTPATIENT)
Dept: FAMILY MEDICINE | Facility: CLINIC | Age: 64
End: 2024-06-12
Payer: COMMERCIAL

## 2024-06-12 RX ORDER — SEMAGLUTIDE 1.34 MG/ML
INJECTION, SOLUTION SUBCUTANEOUS
Qty: 9 ML | Refills: 0 | Status: SHIPPED | OUTPATIENT
Start: 2024-06-12 | End: 2024-09-19

## 2024-06-12 NOTE — TELEPHONE ENCOUNTER
Disp Refills Start End LUCI    OZEMPIC, 1 MG/DOSE, 4 MG/3ML pen 9 mL 0 6/12/2024 -- No   Sig: INJECT 1 MG UNDER THE SKIN ONCE EVERY 7 DAYS   Sent to pharmacy as: Ozempic (1 MG/DOSE) 4 MG/3ML Subcutaneous Solution Pen-injector (Semaglutide (1 MG/DOSE))   Class: E-Prescribe   Order: [id number]   E-Prescribing Status: Receipt confirmed by pharmacy (6/12/2024  2:07 PM CDT)   Renewals    Renewal provider: Concha Lucas MD          Pharmacy    [place], [place] - [address] [place] [address] DYLAN CALL, RN on 6/12/2024 at 3:25 PM

## 2024-06-15 ENCOUNTER — MYC REFILL (OUTPATIENT)
Dept: FAMILY MEDICINE | Facility: CLINIC | Age: 64
End: 2024-06-15
Payer: COMMERCIAL

## 2024-06-15 DIAGNOSIS — E78.5 HYPERLIPIDEMIA LDL GOAL <100: ICD-10-CM

## 2024-06-15 DIAGNOSIS — I10 HTN, GOAL BELOW 140/90: Primary | ICD-10-CM

## 2024-06-15 DIAGNOSIS — F33.1 MAJOR DEPRESSIVE DISORDER, RECURRENT EPISODE, MODERATE (H): ICD-10-CM

## 2024-06-15 RX ORDER — BLOOD SUGAR DIAGNOSTIC
STRIP MISCELLANEOUS
Qty: 50 STRIP | Refills: 10 | Status: CANCELLED | OUTPATIENT
Start: 2024-06-15

## 2024-06-17 ENCOUNTER — MYC MEDICAL ADVICE (OUTPATIENT)
Dept: FAMILY MEDICINE | Facility: CLINIC | Age: 64
End: 2024-06-17
Payer: COMMERCIAL

## 2024-06-17 RX ORDER — PIOGLITAZONEHYDROCHLORIDE 30 MG/1
30 TABLET ORAL DAILY
Qty: 90 TABLET | Refills: 1 | OUTPATIENT
Start: 2024-06-17

## 2024-06-17 RX ORDER — LISINOPRIL 5 MG/1
5 TABLET ORAL DAILY
Qty: 90 TABLET | Refills: 0 | Status: CANCELLED | OUTPATIENT
Start: 2024-06-17

## 2024-06-17 NOTE — TELEPHONE ENCOUNTER
Patient should have refills on file for test strips and actos at pharmacy.     Gerber CRANE RN 6/17/2024 at 9:28 AM

## 2024-06-21 RX ORDER — LISINOPRIL 5 MG/1
5 TABLET ORAL DAILY
Qty: 90 TABLET | Refills: 0 | Status: SHIPPED | OUTPATIENT
Start: 2024-06-21 | End: 2024-09-19

## 2024-06-21 RX ORDER — ATORVASTATIN CALCIUM 10 MG/1
10 TABLET, FILM COATED ORAL DAILY
Qty: 90 TABLET | Refills: 0 | Status: SHIPPED | OUTPATIENT
Start: 2024-06-21 | End: 2024-09-19

## 2024-06-21 RX ORDER — BUPROPION HYDROCHLORIDE 300 MG/1
300 TABLET ORAL EVERY MORNING
Qty: 90 TABLET | Refills: 0 | Status: SHIPPED | OUTPATIENT
Start: 2024-06-21 | End: 2024-09-19

## 2024-06-28 ENCOUNTER — MYC MEDICAL ADVICE (OUTPATIENT)
Dept: FAMILY MEDICINE | Facility: CLINIC | Age: 64
End: 2024-06-28
Payer: COMMERCIAL

## 2024-08-21 ENCOUNTER — PATIENT OUTREACH (OUTPATIENT)
Dept: CARE COORDINATION | Facility: CLINIC | Age: 64
End: 2024-08-21
Payer: COMMERCIAL

## 2024-08-25 ENCOUNTER — HEALTH MAINTENANCE LETTER (OUTPATIENT)
Age: 64
End: 2024-08-25

## 2024-09-04 ENCOUNTER — PATIENT OUTREACH (OUTPATIENT)
Dept: CARE COORDINATION | Facility: CLINIC | Age: 64
End: 2024-09-04
Payer: COMMERCIAL

## 2024-09-19 DIAGNOSIS — E11.22 TYPE 2 DIABETES MELLITUS WITH STAGE 3 CHRONIC KIDNEY DISEASE, WITHOUT LONG-TERM CURRENT USE OF INSULIN, UNSPECIFIED WHETHER STAGE 3A OR 3B CKD (H): ICD-10-CM

## 2024-09-19 DIAGNOSIS — N18.30 TYPE 2 DIABETES MELLITUS WITH STAGE 3 CHRONIC KIDNEY DISEASE, WITHOUT LONG-TERM CURRENT USE OF INSULIN, UNSPECIFIED WHETHER STAGE 3A OR 3B CKD (H): ICD-10-CM

## 2024-09-19 DIAGNOSIS — E78.5 HYPERLIPIDEMIA LDL GOAL <100: ICD-10-CM

## 2024-09-19 DIAGNOSIS — F33.1 MAJOR DEPRESSIVE DISORDER, RECURRENT EPISODE, MODERATE (H): ICD-10-CM

## 2024-09-19 DIAGNOSIS — I10 HTN, GOAL BELOW 140/90: ICD-10-CM

## 2024-09-19 RX ORDER — SEMAGLUTIDE 1.34 MG/ML
INJECTION, SOLUTION SUBCUTANEOUS
Qty: 9 ML | Refills: 0 | Status: SHIPPED | OUTPATIENT
Start: 2024-09-19

## 2024-09-19 RX ORDER — BUPROPION HYDROCHLORIDE 300 MG/1
300 TABLET ORAL EVERY MORNING
Qty: 90 TABLET | Refills: 0 | Status: SHIPPED | OUTPATIENT
Start: 2024-09-19

## 2024-09-19 RX ORDER — LISINOPRIL 5 MG/1
5 TABLET ORAL DAILY
Qty: 90 TABLET | Refills: 0 | Status: SHIPPED | OUTPATIENT
Start: 2024-09-19

## 2024-09-19 RX ORDER — ATORVASTATIN CALCIUM 10 MG/1
10 TABLET, FILM COATED ORAL DAILY
Qty: 90 TABLET | Refills: 0 | Status: SHIPPED | OUTPATIENT
Start: 2024-09-19

## 2024-09-25 ENCOUNTER — PATIENT OUTREACH (OUTPATIENT)
Dept: CARE COORDINATION | Facility: CLINIC | Age: 64
End: 2024-09-25
Payer: COMMERCIAL

## 2024-10-07 ENCOUNTER — TRANSFERRED RECORDS (OUTPATIENT)
Dept: HEALTH INFORMATION MANAGEMENT | Facility: CLINIC | Age: 64
End: 2024-10-07
Payer: COMMERCIAL

## 2024-10-23 ENCOUNTER — PATIENT OUTREACH (OUTPATIENT)
Dept: CARE COORDINATION | Facility: CLINIC | Age: 64
End: 2024-10-23
Payer: COMMERCIAL

## 2024-12-13 DIAGNOSIS — E11.22 TYPE 2 DIABETES MELLITUS WITH STAGE 3 CHRONIC KIDNEY DISEASE, WITHOUT LONG-TERM CURRENT USE OF INSULIN, UNSPECIFIED WHETHER STAGE 3A OR 3B CKD (H): ICD-10-CM

## 2024-12-13 DIAGNOSIS — N18.30 TYPE 2 DIABETES MELLITUS WITH STAGE 3 CHRONIC KIDNEY DISEASE, WITHOUT LONG-TERM CURRENT USE OF INSULIN, UNSPECIFIED WHETHER STAGE 3A OR 3B CKD (H): ICD-10-CM

## 2024-12-15 RX ORDER — PIOGLITAZONE 30 MG/1
30 TABLET ORAL DAILY
Qty: 90 TABLET | Refills: 1 | OUTPATIENT
Start: 2024-12-15

## 2024-12-29 ENCOUNTER — HEALTH MAINTENANCE LETTER (OUTPATIENT)
Age: 64
End: 2024-12-29

## 2025-01-01 DIAGNOSIS — I10 HTN, GOAL BELOW 140/90: ICD-10-CM

## 2025-01-05 RX ORDER — LISINOPRIL 5 MG/1
5 TABLET ORAL DAILY
Qty: 90 TABLET | Refills: 0 | OUTPATIENT
Start: 2025-01-05

## 2025-01-13 DIAGNOSIS — I10 HTN, GOAL BELOW 140/90: ICD-10-CM

## 2025-01-14 RX ORDER — LISINOPRIL 5 MG/1
5 TABLET ORAL DAILY
Qty: 90 TABLET | Refills: 0 | OUTPATIENT
Start: 2025-01-14

## 2025-04-10 ENCOUNTER — TELEPHONE (OUTPATIENT)
Dept: FAMILY MEDICINE | Facility: CLINIC | Age: 65
End: 2025-04-10

## 2025-04-10 NOTE — CONFIDENTIAL NOTE
Patient Quality Outreach    Patient is due for the following:   Diabetes -  Eye Exam  Hypertension -  BP check  Breast Cancer Screening - Mammogram    Action(s) Taken:   Schedule a nurse only visit for bp    Type of outreach:    Sent [employer] message.    Questions for provider review:    None         Dany May MA  Chart routed to .

## 2025-04-10 NOTE — LETTER
[facility]  [address] [facility] [address] [phone]       April 18, 2025    Susan Dietrich  [address] [place] [id number]    Dear Radha,    We care about your health and have reviewed your health plan and are making recommendations based on this review, to optimize your health.    You are in particular need of attention regarding:  -High Blood Pressure  -Breast Cancer Screening    We are recommending that you:  -schedule a NURSE-ONLY BLOOD PRESSURE APPOINTMENT within the next 1-4 weeks.    -schedule a MAMMOGRAM which is due.    1 in 8 women will develop invasive breast cancer during her lifetime and it is the most common non-skin cancer in [place] women.  EARLY detection, new treatments, and a better understanding of the disease have increased survival rates - the 5 year survival rate in the 1960s was 63% and today it is close to 90%.    If you are under/uninsured, we recommend you contact the [facility]. They offer mammograms at no charge or on a sliding fee charge. You can schedule with them at [phone]. Please have them send us the results.      Please disregard this reminder if you have had this exam elsewhere within the last year.  It would be helpful for us to have a copy of your mammogram report in your file so that we can best coordinate your care - please contact us with when your test was done so we can update your record.               In addition, here is a list of due or overdue Health Maintenance reminders.    Health Maintenance Due   Topic Date Due    LUNG CANCER SCREENING  Never done    RSV VACCINE (1 - Risk 60-74 years 1-dose series) Never done    Diabetic Foot Exam  07/01/2022    Kidney Microalbumin Urine Test  02/27/2024    A1C Lab  06/27/2024    Flu Vaccine (1) 09/01/2024    COVID-19 Vaccine (5 - 2024-25 season) 09/01/2024    ANNUAL REVIEW OF HM ORDERS  09/20/2024    Depression Assessment  09/26/2024    Comprehensive Metabolic Panel   09/27/2024    Cholesterol Lab  09/27/2024    Mammogram  10/25/2024    Eye Exam  04/30/2025       To address the above recommendations, we encourage you to contact us at [phone], via NWIX or by contacting Central Scheduling toll free at [phone] 24 hours a day. They will assist you with finding the most convenient time and location.    Thank you for trusting [facility] and we appreciate the opportunity to serve you.  We look forward to supporting your healthcare needs in the future.    Healthy Regards,    Your [facility] Team

## 2025-04-18 NOTE — CONFIDENTIAL NOTE
Patient Quality Outreach    Patient is due for the following:   Hypertension -  BP check  Breast Cancer Screening - Mammogram    Action(s) Taken:   Schedule a nurse only visit for bp    Type of outreach:    Sent letter.    Questions for provider review:    None         Dany May MA  Chart routed to .

## 2025-04-19 ENCOUNTER — HEALTH MAINTENANCE LETTER (OUTPATIENT)
Age: 65
End: 2025-04-19

## 2025-04-23 ENCOUNTER — PATIENT OUTREACH (OUTPATIENT)
Dept: CARE COORDINATION | Facility: CLINIC | Age: 65
End: 2025-04-23
Payer: COMMERCIAL

## 2025-08-02 ENCOUNTER — HEALTH MAINTENANCE LETTER (OUTPATIENT)
Age: 65
End: 2025-08-02

## (undated) DEVICE — KIT ENDO TURNOVER/PROCEDURE W/CLEAN A SCOPE LINERS 103888

## (undated) RX ORDER — FENTANYL CITRATE 50 UG/ML
INJECTION, SOLUTION INTRAMUSCULAR; INTRAVENOUS
Status: DISPENSED
Start: 2023-01-30